# Patient Record
Sex: FEMALE | Race: WHITE | NOT HISPANIC OR LATINO | Employment: FULL TIME | URBAN - METROPOLITAN AREA
[De-identification: names, ages, dates, MRNs, and addresses within clinical notes are randomized per-mention and may not be internally consistent; named-entity substitution may affect disease eponyms.]

---

## 2017-01-02 ENCOUNTER — HOSPITAL ENCOUNTER (INPATIENT)
Facility: MEDICAL CENTER | Age: 42
LOS: 2 days | DRG: 872 | End: 2017-01-04
Attending: EMERGENCY MEDICINE | Admitting: FAMILY MEDICINE
Payer: COMMERCIAL

## 2017-01-02 ENCOUNTER — APPOINTMENT (OUTPATIENT)
Dept: RADIOLOGY | Facility: MEDICAL CENTER | Age: 42
DRG: 872 | End: 2017-01-02
Payer: COMMERCIAL

## 2017-01-02 ENCOUNTER — APPOINTMENT (OUTPATIENT)
Dept: RADIOLOGY | Facility: MEDICAL CENTER | Age: 42
DRG: 872 | End: 2017-01-02
Attending: EMERGENCY MEDICINE
Payer: COMMERCIAL

## 2017-01-02 ENCOUNTER — RESOLUTE PROFESSIONAL BILLING HOSPITAL PROF FEE (OUTPATIENT)
Dept: HOSPITALIST | Facility: MEDICAL CENTER | Age: 42
End: 2017-01-02
Payer: COMMERCIAL

## 2017-01-02 DIAGNOSIS — R10.9 ABDOMINAL PAIN, UNSPECIFIED LOCATION: ICD-10-CM

## 2017-01-02 DIAGNOSIS — R11.11 VOMITING WITHOUT NAUSEA, INTRACTABILITY OF VOMITING NOT SPECIFIED, UNSPECIFIED VOMITING TYPE: ICD-10-CM

## 2017-01-02 DIAGNOSIS — R19.7 DIARRHEA, UNSPECIFIED TYPE: ICD-10-CM

## 2017-01-02 PROBLEM — A41.9 SEPSIS (HCC): Status: ACTIVE | Noted: 2017-01-02

## 2017-01-02 LAB
ALBUMIN SERPL BCP-MCNC: 4.5 G/DL (ref 3.2–4.9)
ALBUMIN/GLOB SERPL: 1.7 G/DL
ALP SERPL-CCNC: 56 U/L (ref 30–99)
ALT SERPL-CCNC: 28 U/L (ref 2–50)
AMORPH CRY #/AREA URNS HPF: PRESENT /HPF
ANION GAP SERPL CALC-SCNC: 11 MMOL/L (ref 0–11.9)
APPEARANCE UR: ABNORMAL
AST SERPL-CCNC: 20 U/L (ref 12–45)
BACTERIA GENITAL QL WET PREP: NORMAL
BASOPHILS # BLD AUTO: 0.6 % (ref 0–1.8)
BASOPHILS # BLD: 0.13 K/UL (ref 0–0.12)
BILIRUB SERPL-MCNC: 0.5 MG/DL (ref 0.1–1.5)
BILIRUB UR QL STRIP.AUTO: NEGATIVE
BUN SERPL-MCNC: 20 MG/DL (ref 8–22)
C DIFF DNA SPEC QL NAA+PROBE: NEGATIVE
C DIFF TOX GENS STL QL NAA+PROBE: NEGATIVE
CALCIUM SERPL-MCNC: 9.4 MG/DL (ref 8.5–10.5)
CHLORIDE SERPL-SCNC: 107 MMOL/L (ref 96–112)
CO2 SERPL-SCNC: 15 MMOL/L (ref 20–33)
COLOR UR: YELLOW
CREAT SERPL-MCNC: 0.72 MG/DL (ref 0.5–1.4)
EKG IMPRESSION: NORMAL
EOSINOPHIL # BLD AUTO: 0.02 K/UL (ref 0–0.51)
EOSINOPHIL NFR BLD: 0.1 % (ref 0–6.9)
EPI CELLS #/AREA URNS HPF: NORMAL /HPF
ERYTHROCYTE [DISTWIDTH] IN BLOOD BY AUTOMATED COUNT: 39.8 FL (ref 35.9–50)
GFR SERPL CREATININE-BSD FRML MDRD: >60 ML/MIN/1.73 M 2
GLOBULIN SER CALC-MCNC: 2.7 G/DL (ref 1.9–3.5)
GLUCOSE SERPL-MCNC: 128 MG/DL (ref 65–99)
GLUCOSE UR STRIP.AUTO-MCNC: NEGATIVE MG/DL
HCG SERPL QL: NEGATIVE
HCT VFR BLD AUTO: 48.1 % (ref 37–47)
HGB BLD-MCNC: 16.9 G/DL (ref 12–16)
IMM GRANULOCYTES # BLD AUTO: 0.1 K/UL (ref 0–0.11)
IMM GRANULOCYTES NFR BLD AUTO: 0.4 % (ref 0–0.9)
KETONES UR STRIP.AUTO-MCNC: NEGATIVE MG/DL
LACTATE BLD-SCNC: 1.6 MMOL/L (ref 0.5–2)
LACTATE BLD-SCNC: 2 MMOL/L (ref 0.5–2)
LACTATE BLD-SCNC: 2.9 MMOL/L (ref 0.5–2)
LEUKOCYTE ESTERASE UR QL STRIP.AUTO: NEGATIVE
LIPASE SERPL-CCNC: 10 U/L (ref 11–82)
LYMPHOCYTES # BLD AUTO: 0.67 K/UL (ref 1–4.8)
LYMPHOCYTES NFR BLD: 3 % (ref 22–41)
MCH RBC QN AUTO: 31.4 PG (ref 27–33)
MCHC RBC AUTO-ENTMCNC: 35.1 G/DL (ref 33.6–35)
MCV RBC AUTO: 89.2 FL (ref 81.4–97.8)
MICRO URNS: ABNORMAL
MONOCYTES # BLD AUTO: 0.64 K/UL (ref 0–0.85)
MONOCYTES NFR BLD AUTO: 2.9 % (ref 0–13.4)
MUCOUS THREADS #/AREA URNS HPF: NORMAL /HPF
NEUTROPHILS # BLD AUTO: 20.85 K/UL (ref 2–7.15)
NEUTROPHILS NFR BLD: 93 % (ref 44–72)
NITRITE UR QL STRIP.AUTO: NEGATIVE
NRBC # BLD AUTO: 0 K/UL
NRBC BLD AUTO-RTO: 0 /100 WBC
PH UR STRIP.AUTO: 5.5 [PH]
PLATELET # BLD AUTO: 235 K/UL (ref 164–446)
PMV BLD AUTO: 10.7 FL (ref 9–12.9)
POTASSIUM SERPL-SCNC: 4 MMOL/L (ref 3.6–5.5)
PROT SERPL-MCNC: 7.2 G/DL (ref 6–8.2)
PROT UR QL STRIP: NEGATIVE MG/DL
RBC # BLD AUTO: 5.39 M/UL (ref 4.2–5.4)
RBC UR QL AUTO: ABNORMAL
SIGNIFICANT IND 70042: NORMAL
SITE SITE: NORMAL
SODIUM SERPL-SCNC: 133 MMOL/L (ref 135–145)
SOURCE SOURCE: NORMAL
SP GR UR STRIP.AUTO: 1.02
WBC # BLD AUTO: 22.4 K/UL (ref 4.8–10.8)

## 2017-01-02 PROCEDURE — 96365 THER/PROPH/DIAG IV INF INIT: CPT

## 2017-01-02 PROCEDURE — 87591 N.GONORRHOEAE DNA AMP PROB: CPT

## 2017-01-02 PROCEDURE — 99285 EMERGENCY DEPT VISIT HI MDM: CPT

## 2017-01-02 PROCEDURE — 71010 DX-CHEST-LIMITED (1 VIEW): CPT

## 2017-01-02 PROCEDURE — 36415 COLL VENOUS BLD VENIPUNCTURE: CPT

## 2017-01-02 PROCEDURE — 87040 BLOOD CULTURE FOR BACTERIA: CPT | Mod: 91

## 2017-01-02 PROCEDURE — 87086 URINE CULTURE/COLONY COUNT: CPT

## 2017-01-02 PROCEDURE — 87493 C DIFF AMPLIFIED PROBE: CPT

## 2017-01-02 PROCEDURE — 84703 CHORIONIC GONADOTROPIN ASSAY: CPT

## 2017-01-02 PROCEDURE — 83690 ASSAY OF LIPASE: CPT

## 2017-01-02 PROCEDURE — A9270 NON-COVERED ITEM OR SERVICE: HCPCS | Performed by: FAMILY MEDICINE

## 2017-01-02 PROCEDURE — 700105 HCHG RX REV CODE 258: Performed by: FAMILY MEDICINE

## 2017-01-02 PROCEDURE — 99223 1ST HOSP IP/OBS HIGH 75: CPT | Performed by: FAMILY MEDICINE

## 2017-01-02 PROCEDURE — 700102 HCHG RX REV CODE 250 W/ 637 OVERRIDE(OP): Performed by: FAMILY MEDICINE

## 2017-01-02 PROCEDURE — 700117 HCHG RX CONTRAST REV CODE 255: Performed by: EMERGENCY MEDICINE

## 2017-01-02 PROCEDURE — 87491 CHLMYD TRACH DNA AMP PROBE: CPT

## 2017-01-02 PROCEDURE — 770020 HCHG ROOM/CARE - TELE (206)

## 2017-01-02 PROCEDURE — 81001 URINALYSIS AUTO W/SCOPE: CPT

## 2017-01-02 PROCEDURE — 87503 INFLUENZA DNA AMP PROB ADDL: CPT

## 2017-01-02 PROCEDURE — 700105 HCHG RX REV CODE 258: Performed by: EMERGENCY MEDICINE

## 2017-01-02 PROCEDURE — 83605 ASSAY OF LACTIC ACID: CPT

## 2017-01-02 PROCEDURE — 84443 ASSAY THYROID STIM HORMONE: CPT

## 2017-01-02 PROCEDURE — 74177 CT ABD & PELVIS W/CONTRAST: CPT

## 2017-01-02 PROCEDURE — 80053 COMPREHEN METABOLIC PANEL: CPT

## 2017-01-02 PROCEDURE — 96361 HYDRATE IV INFUSION ADD-ON: CPT

## 2017-01-02 PROCEDURE — 700111 HCHG RX REV CODE 636 W/ 250 OVERRIDE (IP): Performed by: FAMILY MEDICINE

## 2017-01-02 PROCEDURE — 93005 ELECTROCARDIOGRAM TRACING: CPT

## 2017-01-02 PROCEDURE — 85025 COMPLETE CBC W/AUTO DIFF WBC: CPT

## 2017-01-02 PROCEDURE — 87502 INFLUENZA DNA AMP PROBE: CPT

## 2017-01-02 PROCEDURE — 85730 THROMBOPLASTIN TIME PARTIAL: CPT

## 2017-01-02 RX ORDER — ACETAMINOPHEN 325 MG/1
650 TABLET ORAL EVERY 6 HOURS PRN
Status: DISCONTINUED | OUTPATIENT
Start: 2017-01-02 | End: 2017-01-04 | Stop reason: HOSPADM

## 2017-01-02 RX ORDER — ONDANSETRON 4 MG/1
4 TABLET, ORALLY DISINTEGRATING ORAL EVERY 4 HOURS PRN
Status: DISCONTINUED | OUTPATIENT
Start: 2017-01-02 | End: 2017-01-04 | Stop reason: HOSPADM

## 2017-01-02 RX ORDER — EPINEPHRINE 0.3 MG/.3ML
0.3 INJECTION SUBCUTANEOUS ONCE
COMMUNITY
End: 2017-08-09

## 2017-01-02 RX ORDER — PROMETHAZINE HYDROCHLORIDE 25 MG/1
12.5-25 TABLET ORAL EVERY 4 HOURS PRN
Status: DISCONTINUED | OUTPATIENT
Start: 2017-01-02 | End: 2017-01-04 | Stop reason: HOSPADM

## 2017-01-02 RX ORDER — FUROSEMIDE 20 MG/1
20 TABLET ORAL PRN
Status: ON HOLD | COMMUNITY
End: 2018-06-04

## 2017-01-02 RX ORDER — OXYCODONE HYDROCHLORIDE 10 MG/1
10 TABLET ORAL EVERY 4 HOURS PRN
Status: DISCONTINUED | OUTPATIENT
Start: 2017-01-02 | End: 2017-01-04 | Stop reason: HOSPADM

## 2017-01-02 RX ORDER — OXYCODONE HYDROCHLORIDE 5 MG/1
5-10 TABLET ORAL EVERY 4 HOURS PRN
Status: DISCONTINUED | OUTPATIENT
Start: 2017-01-02 | End: 2017-01-02

## 2017-01-02 RX ORDER — SODIUM CHLORIDE 9 MG/ML
1000 INJECTION, SOLUTION INTRAVENOUS ONCE
Status: COMPLETED | OUTPATIENT
Start: 2017-01-02 | End: 2017-01-02

## 2017-01-02 RX ORDER — SODIUM CHLORIDE 9 MG/ML
INJECTION, SOLUTION INTRAVENOUS CONTINUOUS
Status: DISCONTINUED | OUTPATIENT
Start: 2017-01-02 | End: 2017-01-04 | Stop reason: HOSPADM

## 2017-01-02 RX ORDER — IBUPROFEN 200 MG
600 TABLET ORAL EVERY 6 HOURS PRN
Status: ON HOLD | COMMUNITY
End: 2017-08-23

## 2017-01-02 RX ORDER — POTASSIUM CHLORIDE 750 MG/1
10 TABLET, EXTENDED RELEASE ORAL PRN
Status: ON HOLD | COMMUNITY
End: 2017-01-04

## 2017-01-02 RX ORDER — SODIUM CHLORIDE 9 MG/ML
1000 INJECTION, SOLUTION INTRAVENOUS
Status: COMPLETED | OUTPATIENT
Start: 2017-01-02 | End: 2017-01-03

## 2017-01-02 RX ORDER — CIPROFLOXACIN 2 MG/ML
400 INJECTION, SOLUTION INTRAVENOUS EVERY 12 HOURS
Status: DISCONTINUED | OUTPATIENT
Start: 2017-01-02 | End: 2017-01-04

## 2017-01-02 RX ORDER — OXYCODONE HYDROCHLORIDE 5 MG/1
5 TABLET ORAL EVERY 4 HOURS PRN
Status: DISCONTINUED | OUTPATIENT
Start: 2017-01-02 | End: 2017-01-04 | Stop reason: HOSPADM

## 2017-01-02 RX ORDER — PROMETHAZINE HYDROCHLORIDE 25 MG/1
12.5-25 SUPPOSITORY RECTAL EVERY 4 HOURS PRN
Status: DISCONTINUED | OUTPATIENT
Start: 2017-01-02 | End: 2017-01-04 | Stop reason: HOSPADM

## 2017-01-02 RX ORDER — SODIUM CHLORIDE 9 MG/ML
30 INJECTION, SOLUTION INTRAVENOUS
Status: COMPLETED | OUTPATIENT
Start: 2017-01-02 | End: 2017-01-03

## 2017-01-02 RX ORDER — ONDANSETRON 2 MG/ML
4 INJECTION INTRAMUSCULAR; INTRAVENOUS EVERY 4 HOURS PRN
Status: DISCONTINUED | OUTPATIENT
Start: 2017-01-02 | End: 2017-01-04 | Stop reason: HOSPADM

## 2017-01-02 RX ADMIN — ACETAMINOPHEN 650 MG: 325 TABLET, FILM COATED ORAL at 21:21

## 2017-01-02 RX ADMIN — IOHEXOL 100 ML: 350 INJECTION, SOLUTION INTRAVENOUS at 18:13

## 2017-01-02 RX ADMIN — SODIUM CHLORIDE 1000 ML: 9 INJECTION, SOLUTION INTRAVENOUS at 16:50

## 2017-01-02 RX ADMIN — SODIUM CHLORIDE 2910 ML: 9 INJECTION, SOLUTION INTRAVENOUS at 21:33

## 2017-01-02 RX ADMIN — CIPROFLOXACIN 400 MG: 2 INJECTION, SOLUTION INTRAVENOUS at 21:23

## 2017-01-02 ASSESSMENT — ENCOUNTER SYMPTOMS
VOMITING: 1
SHORTNESS OF BREATH: 1
ABDOMINAL PAIN: 1
BLOOD IN STOOL: 0
NAUSEA: 1
DIARRHEA: 1
ROS GI COMMENTS: NO HEMATEMESIS
MYALGIAS: 1
FEVER: 1
SORE THROAT: 0

## 2017-01-02 ASSESSMENT — PAIN SCALES - GENERAL
PAINLEVEL_OUTOF10: 5
PAINLEVEL_OUTOF10: 4
PAINLEVEL_OUTOF10: 5

## 2017-01-02 ASSESSMENT — LIFESTYLE VARIABLES
EVER_SMOKED: YES
ALCOHOL_USE: NO

## 2017-01-02 NOTE — IP AVS SNAPSHOT
Uberseq Access Code: [email or web address]  Expires: 2/3/2017  3:30 PM    Your email address is not on file at [employer]  Email Addresses are required for you to sign up for Uberseq, please contact [phone] to verify your personal information and to provide your email address prior to attempting to register for Uberseq.    Bria Martinez  [address] [place] [address]    Uberseq  A secure, online tool to manage your health information     [employer]’s Uberseq® is a secure, online tool that connects you to your personalized health information from the privacy of your home -- day or night - making it very easy for you to manage your healthcare. Once the activation process is completed, you can even access your medical information using the Uberseq alessandro, which is available for free in the Apple Alessandro store or Google Play store.     To learn more about Uberseq, visit [email or web address]    There are two levels of access available (as shown below):   My Chart Features  [facility] Primary Care Doctor [facility]  Specialists [facility]  Urgent  Care Non-[facility] Primary Care Doctor   Email your healthcare team securely and privately 24/7 X X X    Manage appointments: schedule your next appointment; view details of past/upcoming appointments X      Request prescription refills. X      View recent personal medical records, including lab and immunizations X X X X   View health record, including health history, allergies, medications X X X X   Read reports about your outpatient visits, procedures, consult and ER notes X X X X   See your discharge summary, which is a recap of your hospital and/or ER visit that includes your diagnosis, lab results, and care plan X X  X     How to register for Uberseq:  Once your e-mail address has been verified, follow the following steps to sign up for Uberseq.     1. Go to  [email or web address]  2. Click on the Sign Up Now box, which takes you to the New Member Sign Up page. You  will need to provide the following information:  a. Enter your Shopsy Access Code exactly as it appears at the top of this page. (You will not need to use this code after you’ve completed the sign-up process. If you do not sign up before the expiration date, you must request a new code.)   b. Enter your date of birth.   c. Enter your home email address.   d. Click Submit, and follow the next screen’s instructions.  3. Create a QR Pharmat ID. This will be your Shopsy login ID and cannot be changed, so think of one that is secure and easy to remember.  4. Create a Shopsy password. You can change your password at any time.  5. Enter your Password Reset Question and Answer. This can be used at a later time if you forget your password.   6. Enter your e-mail address. This allows you to receive e-mail notifications when new information is available in Shopsy.  7. Click Sign Up. You can now view your health information.    For assistance activating your Shopsy account, call [phone]

## 2017-01-02 NOTE — ED NOTES
Pt ambulatory to triage. EKG performed and shown to ERP    2 weeks ago patient had the same symptoms 2 weeks ago lasting 3-4 days  Symptoms returned this AM. cramping in legs, stomach,   HX of edema

## 2017-01-02 NOTE — IP AVS SNAPSHOT
" <p align=\"LEFT\"><IMG SRC=\"[email or web address]\" alt=\"Image\" WIDTH=\"50%\" HEIGHT=\"200\" BORDER=\"\"></p>                   Name:Bria Martinez  Medical Record Number:[id number]  CSN: [id number]    YOB: 1975   Age: 41 y.o.  Sex: female  HT:1.6 m (5' 2.99\") WT: 108.4 kg (238 lb 15.7 oz)          Admit Date: 1/2/2017     Discharge Date:   Today's Date: 1/4/2017  Attending Doctor:  Edward Butterfield M.D.                  Allergies:  Bee; Augmentin; Latex; Other misc; and Tape          Follow-up Information     1. Follow up with Kelvin Pearson M.D..    Specialty:  Family Medicine    Contact information    [address]  [address]  [facility] [address]  [phone]           Medication List      Take these Medications        Instructions    CALCIUM-MAGNESIUM-ZINC PO    Take 1 Tab by mouth every day.   Dose:  1 Tab       ciprofloxacin 500 MG Tabs   Commonly known as:  CIPRO    Take 1 Tab by mouth 2 times a day for 5 days.   Dose:  500 mg       EPIPEN 2-MARLYN 0.3 MG/0.3ML Soaj solution for injection   Generic drug:  EPINEPHrine    0.3 mg by Intramuscular route Once. Indications: Life-Threatening Allergic Reaction   Dose:  0.3 mg       furosemide 20 MG Tabs   Commonly known as:  LASIX    Take 20 mg by mouth as needed. Indications: Edema   Dose:  20 mg       ibuprofen 200 MG Tabs   Commonly known as:  MOTRIN    Take 600 mg by mouth every 6 hours as needed for Mild Pain.   Dose:  600 mg       indomethacin 25 MG Caps   Commonly known as:  INDOCIN    Take 1 Cap by mouth 3 times a day, with meals for 3 days.   Dose:  25 mg       metronidazole 500 MG Tabs   Commonly known as:  FLAGYL    Take 1 Tab by mouth every 8 hours for 5 days.   Dose:  500 mg       potassium chloride SA 20 MEQ Tbcr   What changed:    - medication strength  - when to take this  - reasons to take this   Commonly known as:  K-DUR    Take 0.5 Tabs by mouth every day.   Dose:  10 mEq       vitamin C 500 MG Chew    Take 1,500 mg by mouth every day.   "   Dose:  1500 mg

## 2017-01-02 NOTE — ED NOTES
Charge nurse informed of patients acuity and vital signs.   Protocol ordered and blood drawn sent to lab

## 2017-01-02 NOTE — IP AVS SNAPSHOT
1/4/2017          Bria Martinez  [address]  [place] [place] [address]    Dear Bria:    [facility] wants to ensure your discharge home is safe and you or your loved ones have had all your questions answered regarding your care after you leave the hospital.    You may receive a telephone call within two days of your discharge.  This call is to make certain you understand your discharge instructions as well as ensure we provided you with the best care possible during your stay with us.     The call will only last approximately 3-5 minutes and will be done by a nurse.    Once again, we want to ensure your discharge home is safe and that you have a clear understanding of any next steps in your care.  If you have any questions or concerns, please do not hesitate to contact us, we are here for you.  Thank you for choosing [facility] for your healthcare needs.    Sincerely,    Sourav Shelton    [facility]

## 2017-01-02 NOTE — IP AVS SNAPSHOT
" After Visit Summary                                                                                                                  Name:Bria Martinez  Medical Record Number:[id number]  CSN: [id number]    YOB: 1975   Age: 41 y.o.  Sex: female  HT:1.6 m (5' 2.99\") WT: 108.4 kg (238 lb 15.7 oz)          Admit Date: 1/2/2017     Discharge Date:   Today's Date: 1/4/2017  Attending Doctor:  Edward Butterfield M.D.                  Allergies:  Bee; Augmentin; Latex; Other misc; and Tape            Discharge Instructions       Discharge Instructions    Discharged to home by car with relative. Discharged via wheelchair, hospital escort: Refused.  Special equipment needed: Not Applicable    Be sure to schedule a follow-up appointment with your primary care doctor or any specialists as instructed.     Discharge Plan:   Diet Plan: Discussed  Activity Level: Discussed  Smoking Cessation Offered: Patient Refused  Confirmed Follow up Appointment: Patient to Call and Schedule Appointment  Confirmed Symptoms Management: Discussed  Medication Reconciliation Updated: Yes  Influenza Vaccine Indication: Patient Refuses    I understand that a diet low in cholesterol, fat, and sodium is recommended for good health. Unless I have been given specific instructions below for another diet, I accept this instruction as my diet prescription.   Other diet:     Special Instructions: Sepsis, Adult  Sepsis is a serious infection of your blood or tissues that affects your whole body. The infection that causes sepsis may be bacterial, viral, fungal, or parasitic. Sepsis may be life threatening. Sepsis can cause your blood pressure to drop. This may result in shock. Shock causes your central nervous system and your organs to stop working correctly.   RISK FACTORS  Sepsis can happen in anyone, but it is more likely to happen in people who have weakened immune systems.  SIGNS AND SYMPTOMS   Symptoms of sepsis can include:  · Fever or low " body temperature (hypothermia).  · Rapid breathing (hyperventilation).  · Chills.  · Rapid heartbeat (tachycardia).  · Confusion or light-headedness.  · Trouble breathing.  · Urinating much less than usual.  · Cool, clammy skin or red, flushed skin.  · Other problems with the heart, kidneys, or brain.  DIAGNOSIS   Your health care provider will likely do tests to look for an infection, to see if the infection has spread to your blood, and to see how serious your condition is. Tests can include:  · Blood tests, including cultures of your blood.  · Cultures of other fluids from your body, such as:  ¨ Urine.  ¨ Pus from wounds.  ¨ Mucus coughed up from your lungs.  · Urine tests other than cultures.  · X-ray exams or other imaging tests.  TREATMENT   Treatment will begin with elimination of the source of infection. If your sepsis is likely caused by a bacterial or fungal infection, you will be given antibiotic or antifungal medicines.  You may also receive:  · Oxygen.  · Fluids through an IV tube.  · Medicines to increase your blood pressure.  · A machine to clean your blood (dialysis) if your kidneys fail.  · A machine to help you breathe if your lungs fail.  SEEK IMMEDIATE MEDICAL CARE IF:  You get an infection or develop any of the signs and symptoms of sepsis after surgery or a hospitalization.     This information is not intended to replace advice given to you by your health care provider. Make sure you discuss any questions you have with your health care provider.     Document Released: 09/15/2004 Document Revised: 05/03/2016 Document Reviewed: 08/25/2014  [employer] Interactive Patient Education ©2016 [employer]      · Is patient discharged on Warfarin / Coumadin?   No     · Is patient Post Blood Transfusion?  No    Depression / Suicide Risk    As you are discharged from this [facility] facility, it is important to learn how to keep safe from harming yourself.    Recognize the warning signs:  · Abrupt  changes in personality, positive or negative- including increase in energy   · Giving away possessions  · Change in eating patterns- significant weight changes-  positive or negative  · Change in sleeping patterns- unable to sleep or sleeping all the time   · Unwillingness or inability to communicate  · Depression  · Unusual sadness, discouragement and loneliness  · Talk of wanting to die  · Neglect of personal appearance   · Rebelliousness- reckless behavior  · Withdrawal from people/activities they love  · Confusion- inability to concentrate     If you or a loved one observes any of these behaviors or has concerns about self-harm, here's what you can do:  · Talk about it- your feelings and reasons for harming yourself  · Remove any means that you might use to hurt yourself (examples: pills, rope, extension cords, firearm)  · Get professional help from the community (Mental Health, Substance Abuse, psychological counseling)  · Do not be alone:Call your Safe Contact- someone whom you trust who will be there for you.  · Call your local CRISIS HOTLINE 857-6745 or 922-862-9676  · Call your local Children's Mobile Crisis Response Team [place] (830) 249-3059 or www.Insiders@ Project  · Call the toll free National Suicide Prevention Hotlines   · National Suicide Prevention Lifeline 728-906-SSKV (5446)  · National Hope Line Network 800-SUICIDE (893-2719)        Follow-up Information     1. Follow up with Kelvin Pearson M.D..    Specialty:  Family Medicine    Contact information    [address]  [address]  [place] [address]  [phone]           Discharge Medication Instructions:    Below are the medications your physician expects you to take upon discharge:    Review all your home medications and newly ordered medications with your doctor and/or pharmacist. Follow medication instructions as directed by your doctor and/or pharmacist.    Please keep your medication list with you and share with your physician.                  Medication List      START taking these medications        Instructions    ciprofloxacin 500 MG Tabs   Commonly known as:  CIPRO    Take 1 Tab by mouth 2 times a day for 5 days.   Dose:  500 mg       indomethacin 25 MG Caps   Commonly known as:  INDOCIN    Take 1 Cap by mouth 3 times a day, with meals for 3 days.   Dose:  25 mg       metronidazole 500 MG Tabs   Commonly known as:  FLAGYL    Take 1 Tab by mouth every 8 hours for 5 days.   Dose:  500 mg         CHANGE how you take these medications        Instructions    potassium chloride SA 20 MEQ Tbcr   What changed:    - medication strength  - when to take this  - reasons to take this   Last time this was given:  20 mEq on 1/4/2017  8:32 AM   Commonly known as:  K-DUR   Next Dose Due:  1/5/16    Take 0.5 Tabs by mouth every day.   Dose:  10 mEq         CONTINUE taking these medications        Instructions    CALCIUM-MAGNESIUM-ZINC PO    Take 1 Tab by mouth every day.   Dose:  1 Tab       EPIPEN 2-MARLYN 0.3 MG/0.3ML Soaj solution for injection   Generic drug:  EPINEPHrine    0.3 mg by Intramuscular route Once. Indications: Life-Threatening Allergic Reaction   Dose:  0.3 mg       furosemide 20 MG Tabs   Commonly known as:  LASIX    Take 20 mg by mouth as needed. Indications: Edema   Dose:  20 mg       ibuprofen 200 MG Tabs   Commonly known as:  MOTRIN    Take 600 mg by mouth every 6 hours as needed for Mild Pain.   Dose:  600 mg       vitamin C 500 MG Chew    Take 1,500 mg by mouth every day.   Dose:  1500 mg               Instructions           Diet / Nutrition:    Follow any diet instructions given to you by your doctor or the dietician, including how much salt (sodium) you are allowed each day.    If you are overweight, talk to your doctor about a weight reduction plan.    Activity:    Remain physically active following your doctor's instructions about exercise and activity.    Rest often.     Any time you become even a little tired or short of breath, SIT  DOWN and rest.    Worsening Symptoms:    Report any of the following signs and symptoms to the doctor's office immediately:    *Pain of jaw, arm, or neck  *Chest pain not relieved by medication                               *Dizziness or loss of consciousness  *Difficulty breathing even when at rest   *More tired than usual                                       *Bleeding drainage or swelling of surgical site  *Swelling of feet, ankles, legs or stomach                 *Fever (>100ºF)  *Pink or blood tinged sputum  *Weight gain (3lbs/day or 5lbs /week)           *Shock from internal defibrillator (if applicable)  *Palpitations or irregular heartbeats                *Cool and/or numb extremities    Stroke Awareness    Common Risk Factors for Stroke include:    Age  Atrial Fibrillation  Carotid Artery Stenosis  Diabetes Mellitus  Excessive alcohol consumption  High blood pressure  Overweight   Physical inactivity  Smoking    Warning signs and symptoms of a stroke include:    *Sudden numbness or weakness of the face, arm or leg (especially on one side of the body).  *Sudden confusion, trouble speaking or understanding.  *Sudden trouble seeing in one or both eyes.  *Sudden trouble walking, dizziness, loss of balance or coordination.Sudden severe headache with no known cause.    It is very important to get treatment quickly when a stroke occurs. If you experience any of the above warning signs, call 911 immediately.                   Disclaimer         Quit Smoking / Tobacco Use:    I understand the use of any tobacco products increases my chance of suffering from future heart disease or stroke and could cause other illnesses which may shorten my life. Quitting the use of tobacco products is the single most important thing I can do to improve my health. For further information on smoking / tobacco cessation call a Toll Free Quit Line at 1-503.180.9902 (*National Cancer [place]) or 1-847.787.3760 (American Lung  Association) or you can access the web based program at www.lungusa.org.    [place] Tobacco Users Help Line:  (767) 545-3658       Toll Free: 1-747.576.1944  Quit Tobacco Program [facility] (173)778-9294    Crisis Hotline:    [place] Crisis Hotline:  9-938-KWBSTXY or 1-325.267.3345    [place] Crisis Hotline:    1-311.843.2820 or 586-954-0006    Discharge Survey:   Thank you for choosing [facility]. We hope we did everything we could to make your hospital stay a pleasant one. You may be receiving a phone survey and we would appreciate your time and participation in answering the questions. Your input is very valuable to us in our efforts to improve our service to our patients and their families.        My signature on this form indicates that:    1. I have reviewed and understand the above information.  2. My questions regarding this information have been answered to my satisfaction.  3. I have formulated a plan with my discharge nurse to obtain my prescribed medications for home.                  Disclaimer         __________________________________                     __________       ________                       Patient Signature                                                 Date                    Time

## 2017-01-03 PROBLEM — F17.200 TOBACCO DEPENDENCE: Status: ACTIVE | Noted: 2017-01-03

## 2017-01-03 PROBLEM — E83.42 HYPOMAGNESEMIA: Status: ACTIVE | Noted: 2017-01-03

## 2017-01-03 PROBLEM — K52.9 ENTERITIS: Status: ACTIVE | Noted: 2017-01-03

## 2017-01-03 PROBLEM — E87.6 HYPOKALEMIA: Status: ACTIVE | Noted: 2017-01-03

## 2017-01-03 PROBLEM — N83.209 OVARIAN CYST: Status: ACTIVE | Noted: 2017-01-03

## 2017-01-03 LAB
ALBUMIN SERPL BCP-MCNC: 3.2 G/DL (ref 3.2–4.9)
ALBUMIN/GLOB SERPL: 1.5 G/DL
ALP SERPL-CCNC: 44 U/L (ref 30–99)
ALT SERPL-CCNC: 25 U/L (ref 2–50)
ANION GAP SERPL CALC-SCNC: 5 MMOL/L (ref 0–11.9)
ANION GAP SERPL CALC-SCNC: 8 MMOL/L (ref 0–11.9)
APTT PPP: 26.2 SEC (ref 24.7–36)
AST SERPL-CCNC: 21 U/L (ref 12–45)
BASOPHILS # BLD AUTO: 0.5 % (ref 0–1.8)
BASOPHILS # BLD AUTO: 0.9 % (ref 0–1.8)
BASOPHILS # BLD: 0.05 K/UL (ref 0–0.12)
BASOPHILS # BLD: 0.07 K/UL (ref 0–0.12)
BILIRUB SERPL-MCNC: 0.5 MG/DL (ref 0.1–1.5)
BUN SERPL-MCNC: 14 MG/DL (ref 8–22)
BUN SERPL-MCNC: 7 MG/DL (ref 8–22)
C TRACH DNA SPEC QL NAA+PROBE: NEGATIVE
CALCIUM SERPL-MCNC: 7.3 MG/DL (ref 8.5–10.5)
CALCIUM SERPL-MCNC: 7.8 MG/DL (ref 8.5–10.5)
CHLORIDE SERPL-SCNC: 107 MMOL/L (ref 96–112)
CHLORIDE SERPL-SCNC: 112 MMOL/L (ref 96–112)
CO2 SERPL-SCNC: 18 MMOL/L (ref 20–33)
CO2 SERPL-SCNC: 21 MMOL/L (ref 20–33)
CREAT SERPL-MCNC: 0.57 MG/DL (ref 0.5–1.4)
CREAT SERPL-MCNC: 0.71 MG/DL (ref 0.5–1.4)
EOSINOPHIL # BLD AUTO: 0.02 K/UL (ref 0–0.51)
EOSINOPHIL # BLD AUTO: 0.05 K/UL (ref 0–0.51)
EOSINOPHIL NFR BLD: 0.2 % (ref 0–6.9)
EOSINOPHIL NFR BLD: 0.9 % (ref 0–6.9)
ERYTHROCYTE [DISTWIDTH] IN BLOOD BY AUTOMATED COUNT: 40 FL (ref 35.9–50)
ERYTHROCYTE [DISTWIDTH] IN BLOOD BY AUTOMATED COUNT: 41.1 FL (ref 35.9–50)
FLUAV H1 2009 PAND RNA SPEC QL NAA+PROBE: NOT DETECTED
FLUAV RNA SPEC QL NAA+PROBE: NEGATIVE
FLUBV RNA SPEC QL NAA+PROBE: NEGATIVE
GFR SERPL CREATININE-BSD FRML MDRD: >60 ML/MIN/1.73 M 2
GFR SERPL CREATININE-BSD FRML MDRD: >60 ML/MIN/1.73 M 2
GLOBULIN SER CALC-MCNC: 2.1 G/DL (ref 1.9–3.5)
GLUCOSE SERPL-MCNC: 103 MG/DL (ref 65–99)
GLUCOSE SERPL-MCNC: 89 MG/DL (ref 65–99)
HCT VFR BLD AUTO: 37.9 % (ref 37–47)
HCT VFR BLD AUTO: 39.9 % (ref 37–47)
HGB BLD-MCNC: 12.6 G/DL (ref 12–16)
HGB BLD-MCNC: 13.6 G/DL (ref 12–16)
IMM GRANULOCYTES # BLD AUTO: 0.03 K/UL (ref 0–0.11)
IMM GRANULOCYTES NFR BLD AUTO: 0.2 % (ref 0–0.9)
INR PPP: 1.15 (ref 0.87–1.13)
LACTATE BLD-SCNC: 0.9 MMOL/L (ref 0.5–2)
LYMPHOCYTES # BLD AUTO: 1.4 K/UL (ref 1–4.8)
LYMPHOCYTES # BLD AUTO: 1.4 K/UL (ref 1–4.8)
LYMPHOCYTES NFR BLD: 10.7 % (ref 22–41)
LYMPHOCYTES NFR BLD: 26.9 % (ref 22–41)
MAGNESIUM SERPL-MCNC: 1.5 MG/DL (ref 1.5–2.5)
MANUAL DIFF BLD: NORMAL
MCH RBC QN AUTO: 30.5 PG (ref 27–33)
MCH RBC QN AUTO: 30.6 PG (ref 27–33)
MCHC RBC AUTO-ENTMCNC: 33.2 G/DL (ref 33.6–35)
MCHC RBC AUTO-ENTMCNC: 33.8 G/DL (ref 33.6–35)
MCV RBC AUTO: 90.5 FL (ref 81.4–97.8)
MCV RBC AUTO: 92 FL (ref 81.4–97.8)
MONOCYTES # BLD AUTO: 0.22 K/UL (ref 0–0.85)
MONOCYTES # BLD AUTO: 0.69 K/UL (ref 0–0.85)
MONOCYTES NFR BLD AUTO: 4.3 % (ref 0–13.4)
MONOCYTES NFR BLD AUTO: 5.3 % (ref 0–13.4)
MORPHOLOGY BLD-IMP: NORMAL
N GONORRHOEA DNA SPEC QL NAA+PROBE: NEGATIVE
NEUTROPHILS # BLD AUTO: 10.84 K/UL (ref 2–7.15)
NEUTROPHILS # BLD AUTO: 3.48 K/UL (ref 2–7.15)
NEUTROPHILS NFR BLD: 63.5 % (ref 44–72)
NEUTROPHILS NFR BLD: 83.1 % (ref 44–72)
NEUTS BAND NFR BLD MANUAL: 3.5 % (ref 0–10)
NRBC # BLD AUTO: 0 K/UL
NRBC # BLD AUTO: 0 K/UL
NRBC BLD AUTO-RTO: 0 /100 WBC
NRBC BLD AUTO-RTO: 0 /100 WBC
PLATELET # BLD AUTO: 170 K/UL (ref 164–446)
PLATELET # BLD AUTO: 193 K/UL (ref 164–446)
PLATELET BLD QL SMEAR: NORMAL
PMV BLD AUTO: 11 FL (ref 9–12.9)
PMV BLD AUTO: 11.1 FL (ref 9–12.9)
POTASSIUM SERPL-SCNC: 3.1 MMOL/L (ref 3.6–5.5)
POTASSIUM SERPL-SCNC: 3.6 MMOL/L (ref 3.6–5.5)
PROT SERPL-MCNC: 5.3 G/DL (ref 6–8.2)
PROTHROMBIN TIME: 15.1 SEC (ref 12–14.6)
RBC # BLD AUTO: 4.12 M/UL (ref 4.2–5.4)
RBC # BLD AUTO: 4.42 M/UL (ref 4.2–5.4)
RBC BLD AUTO: NORMAL
SODIUM SERPL-SCNC: 133 MMOL/L (ref 135–145)
SODIUM SERPL-SCNC: 138 MMOL/L (ref 135–145)
SPECIMEN SOURCE: NORMAL
TSH SERPL DL<=0.005 MIU/L-ACNC: 0.53 UIU/ML (ref 0.3–3.7)
WBC # BLD AUTO: 13.1 K/UL (ref 4.8–10.8)
WBC # BLD AUTO: 5.2 K/UL (ref 4.8–10.8)

## 2017-01-03 PROCEDURE — 700102 HCHG RX REV CODE 250 W/ 637 OVERRIDE(OP): Performed by: INTERNAL MEDICINE

## 2017-01-03 PROCEDURE — A9270 NON-COVERED ITEM OR SERVICE: HCPCS | Performed by: INTERNAL MEDICINE

## 2017-01-03 PROCEDURE — 700102 HCHG RX REV CODE 250 W/ 637 OVERRIDE(OP): Performed by: FAMILY MEDICINE

## 2017-01-03 PROCEDURE — 36415 COLL VENOUS BLD VENIPUNCTURE: CPT

## 2017-01-03 PROCEDURE — 85027 COMPLETE CBC AUTOMATED: CPT

## 2017-01-03 PROCEDURE — 770020 HCHG ROOM/CARE - TELE (206)

## 2017-01-03 PROCEDURE — 99233 SBSQ HOSP IP/OBS HIGH 50: CPT | Performed by: INTERNAL MEDICINE

## 2017-01-03 PROCEDURE — 83735 ASSAY OF MAGNESIUM: CPT

## 2017-01-03 PROCEDURE — 85610 PROTHROMBIN TIME: CPT

## 2017-01-03 PROCEDURE — 700105 HCHG RX REV CODE 258: Performed by: FAMILY MEDICINE

## 2017-01-03 PROCEDURE — 700111 HCHG RX REV CODE 636 W/ 250 OVERRIDE (IP): Performed by: INTERNAL MEDICINE

## 2017-01-03 PROCEDURE — A9270 NON-COVERED ITEM OR SERVICE: HCPCS | Performed by: FAMILY MEDICINE

## 2017-01-03 PROCEDURE — 80048 BASIC METABOLIC PNL TOTAL CA: CPT

## 2017-01-03 PROCEDURE — 85007 BL SMEAR W/DIFF WBC COUNT: CPT

## 2017-01-03 PROCEDURE — 700101 HCHG RX REV CODE 250: Performed by: FAMILY MEDICINE

## 2017-01-03 PROCEDURE — 700111 HCHG RX REV CODE 636 W/ 250 OVERRIDE (IP): Performed by: FAMILY MEDICINE

## 2017-01-03 PROCEDURE — 83605 ASSAY OF LACTIC ACID: CPT

## 2017-01-03 RX ORDER — SODIUM CHLORIDE 9 MG/ML
1000 INJECTION, SOLUTION INTRAVENOUS ONCE
Status: COMPLETED | OUTPATIENT
Start: 2017-01-03 | End: 2017-01-03

## 2017-01-03 RX ORDER — POTASSIUM CHLORIDE 1.5 G/1.58G
20 POWDER, FOR SOLUTION ORAL DAILY
Status: DISCONTINUED | OUTPATIENT
Start: 2017-01-03 | End: 2017-01-04 | Stop reason: HOSPADM

## 2017-01-03 RX ORDER — MAGNESIUM SULFATE HEPTAHYDRATE 40 MG/ML
2 INJECTION, SOLUTION INTRAVENOUS ONCE
Status: COMPLETED | OUTPATIENT
Start: 2017-01-03 | End: 2017-01-03

## 2017-01-03 RX ORDER — POTASSIUM CHLORIDE 20 MEQ/1
20 TABLET, EXTENDED RELEASE ORAL DAILY
Status: DISCONTINUED | OUTPATIENT
Start: 2017-01-03 | End: 2017-01-04 | Stop reason: HOSPADM

## 2017-01-03 RX ORDER — NICOTINE 21 MG/24HR
14 PATCH, TRANSDERMAL 24 HOURS TRANSDERMAL
Status: DISCONTINUED | OUTPATIENT
Start: 2017-01-03 | End: 2017-01-04 | Stop reason: HOSPADM

## 2017-01-03 RX ORDER — HEPARIN SODIUM 5000 [USP'U]/ML
5000 INJECTION, SOLUTION INTRAVENOUS; SUBCUTANEOUS EVERY 8 HOURS
Status: DISCONTINUED | OUTPATIENT
Start: 2017-01-03 | End: 2017-01-04 | Stop reason: HOSPADM

## 2017-01-03 RX ADMIN — METRONIDAZOLE 500 MG: 500 INJECTION, SOLUTION INTRAVENOUS at 14:48

## 2017-01-03 RX ADMIN — METRONIDAZOLE 500 MG: 500 INJECTION, SOLUTION INTRAVENOUS at 01:28

## 2017-01-03 RX ADMIN — SODIUM CHLORIDE 1000 ML: 9 INJECTION, SOLUTION INTRAVENOUS at 12:36

## 2017-01-03 RX ADMIN — CIPROFLOXACIN 400 MG: 2 INJECTION, SOLUTION INTRAVENOUS at 20:39

## 2017-01-03 RX ADMIN — METRONIDAZOLE 500 MG: 500 INJECTION, SOLUTION INTRAVENOUS at 22:42

## 2017-01-03 RX ADMIN — METRONIDAZOLE 500 MG: 500 INJECTION, SOLUTION INTRAVENOUS at 06:13

## 2017-01-03 RX ADMIN — ONDANSETRON 4 MG: 2 INJECTION, SOLUTION INTRAMUSCULAR; INTRAVENOUS at 01:28

## 2017-01-03 RX ADMIN — SODIUM CHLORIDE: 9 INJECTION, SOLUTION INTRAVENOUS at 02:21

## 2017-01-03 RX ADMIN — SODIUM CHLORIDE 1000 ML: 9 INJECTION, SOLUTION INTRAVENOUS at 02:52

## 2017-01-03 RX ADMIN — ACETAMINOPHEN, ASPIRIN AND CAFFEINE 1 TABLET: 250; 250; 65 TABLET, FILM COATED ORAL at 17:59

## 2017-01-03 RX ADMIN — SODIUM CHLORIDE: 9 INJECTION, SOLUTION INTRAVENOUS at 12:37

## 2017-01-03 RX ADMIN — MAGNESIUM SULFATE IN WATER 2 G: 40 INJECTION, SOLUTION INTRAVENOUS at 16:03

## 2017-01-03 RX ADMIN — CIPROFLOXACIN 400 MG: 2 INJECTION, SOLUTION INTRAVENOUS at 08:38

## 2017-01-03 RX ADMIN — POTASSIUM CHLORIDE 20 MEQ: 1500 TABLET, EXTENDED RELEASE ORAL at 03:09

## 2017-01-03 RX ADMIN — ACETAMINOPHEN 650 MG: 325 TABLET, FILM COATED ORAL at 08:37

## 2017-01-03 RX ADMIN — SODIUM CHLORIDE: 9 INJECTION, SOLUTION INTRAVENOUS at 22:42

## 2017-01-03 ASSESSMENT — PAIN SCALES - GENERAL
PAINLEVEL_OUTOF10: 6
PAINLEVEL_OUTOF10: 4
PAINLEVEL_OUTOF10: 3
PAINLEVEL_OUTOF10: 3
PAINLEVEL_OUTOF10: 4
PAINLEVEL_OUTOF10: 0

## 2017-01-03 ASSESSMENT — COPD QUESTIONNAIRES
DO YOU EVER COUGH UP ANY MUCUS OR PHLEGM?: NO/ONLY WITH OCCASIONAL COLDS OR INFECTIONS
DURING THE PAST 4 WEEKS HOW MUCH DID YOU FEEL SHORT OF BREATH: NONE/LITTLE OF THE TIME
COPD SCREENING SCORE: 2
HAVE YOU SMOKED AT LEAST 100 CIGARETTES IN YOUR ENTIRE LIFE: YES

## 2017-01-03 ASSESSMENT — LIFESTYLE VARIABLES: EVER_SMOKED: YES

## 2017-01-03 NOTE — PROGRESS NOTES
Dr Butterfield notified of patient blood pressure 81/61 with IV fluids running at 150. 1000ml fluid bolus ordered.

## 2017-01-03 NOTE — ED PROVIDER NOTES
"ED Provider Note    Scribed for Shawn Landon M.D. by Deb Mcdonald. 2017, 4:12 PM.    Primary care provider: Kelvin Pearson M.D.  Means of arrival: Walk-in  History obtained from: Patient   History limited by: None     CHIEF COMPLAINT  Chief Complaint   Patient presents with   • Nausea/Vomiting/Diarrhea     started this am     HPI  Bria Martinez is a 41 y.o. female who presents to the Emergency Department for bilateral, dull, upper quadrant abdominal pain, nausea, vomiting and diarrhea onset this morning that has been constant since then. She states that she she has a bowel movement her abdominal pain is exacerbated. Per patient she has vomited twice since onset. The patient reports subjective fevers. Per patient she experienced intermittent dyspnea. She states that two weeks ago she had the same symptoms for three days that resolved until this morning. Per patient she has intermittent \"cramping\" pain to her bilateral lower extremities. The patient reports that she has experienced foul smelling vaginal bleeding that is \"spotting\" for the past week although she started menopause four months ago. She denies any recent hematemesis, melena or bloody stools, chest pain,  sore throat, congestion, dysuria. Per patient she is sexually active. The patient admits to smoking cigarettes daily. She reports a surgical history of appendectomy, cholecystectomy and . The patient denies being on antibiotics recently.     REVIEW OF SYSTEMS  Review of Systems   Constitutional: Positive for fever.   HENT: Negative for congestion and sore throat.    Respiratory: Positive for shortness of breath.    Cardiovascular: Negative for chest pain.   Gastrointestinal: Positive for nausea, vomiting, abdominal pain and diarrhea. Negative for blood in stool and melena.        No hematemesis    Genitourinary: Negative for dysuria.        Vaginal bleeding    Musculoskeletal: Positive for myalgias.   All other systems " "reviewed and are negative.    PAST MEDICAL HISTORY   has a past medical history of CHEST PAIN (8/8/2012); Hypercholesterolemia (8/8/2012); and Acid reflux disease (8/8/2012).    SURGICAL HISTORY   has past surgical history that includes tonsillectomy; cholecystectomy (2009); and appendectomy.    SOCIAL HISTORY  Social History   Substance Use Topics   • Smoking status: Current Every Day Smoker -- 0.50 packs/day     Types: Cigarettes   • Smokeless tobacco: Never Used   • Alcohol Use: Yes      Comment: RARELY      History   Drug Use No     FAMILY HISTORY  No family history noted    CURRENT MEDICATIONS  No current facility-administered medications on file prior to encounter.     No current outpatient prescriptions on file prior to encounter.     ALLERGIES  Allergies   Allergen Reactions   • Bee Anaphylaxis   • Augmentin Rash     Pt states \"I get a bad rash\".     • Latex Rash     Pt states \"I get a bad rash\".   • Other Misc Rash     Nail acrylic- Pt states \"I get a bad rash\".   • Tape Rash     Pt states \"I get a bad rash\".  Paper tape ok     PHYSICAL EXAM  VITAL SIGNS: /76 mmHg  Pulse 145  Temp(Src) 37.1 °C (98.8 °F) (Temporal)  Resp 16  Ht 1.6 m (5' 2.99\")  Wt 97 kg (213 lb 13.5 oz)  BMI 37.89 kg/m2  SpO2 96%    Constitutional:   No acute distress  HENT:  Moist mucous membranes  Eyes:  No conjunctivitis or icterus  Neck:  trachea is midline, no palpable thyroid  Lymphatic:  No cervical lymphadenopathy  Cardiovascular: Tachycardic rate and regular rhythm, no murmurs  Thorax & Lungs:  Normal breath sounds, no rhonchi  Abdomen:  Soft, moderate right lower quadrant tenderness   : Pelvic exam performed by myself with Meaghan present. Minimal cervical motion tenderness, no discharge, spotting.   Skin:.  no rash  Back:  Non-tender, no CVA tenderness  Extremities: Trace 1+ edema  Vascular:  symmetric radial pulse  Neurologic:  Normal gross motor    LABS  All labs reviewed by me..    Results for orders placed or " performed during the hospital encounter of 01/02/17   CBC WITH DIFFERENTIAL   Result Value Ref Range    WBC 22.4 (H) 4.8 - 10.8 K/uL    RBC 5.39 4.20 - 5.40 M/uL    Hemoglobin 16.9 (H) 12.0 - 16.0 g/dL    Hematocrit 48.1 (H) 37.0 - 47.0 %    MCV 89.2 81.4 - 97.8 fL    MCH 31.4 27.0 - 33.0 pg    MCHC 35.1 (H) 33.6 - 35.0 g/dL    RDW 39.8 35.9 - 50.0 fL    Platelet Count 235 164 - 446 K/uL    MPV 10.7 9.0 - 12.9 fL    Neutrophils-Polys 93.00 (H) 44.00 - 72.00 %    Lymphocytes 3.00 (L) 22.00 - 41.00 %    Monocytes 2.90 0.00 - 13.40 %    Eosinophils 0.10 0.00 - 6.90 %    Basophils 0.60 0.00 - 1.80 %    Immature Granulocytes 0.40 0.00 - 0.90 %    Nucleated RBC 0.00 /100 WBC    Neutrophils (Absolute) 20.85 (H) 2.00 - 7.15 K/uL    Lymphs (Absolute) 0.67 (L) 1.00 - 4.80 K/uL    Monos (Absolute) 0.64 0.00 - 0.85 K/uL    Eos (Absolute) 0.02 0.00 - 0.51 K/uL    Baso (Absolute) 0.13 (H) 0.00 - 0.12 K/uL    Immature Granulocytes (abs) 0.10 0.00 - 0.11 K/uL    NRBC (Absolute) 0.00 K/uL   COMP METABOLIC PANEL   Result Value Ref Range    Sodium 133 (L) 135 - 145 mmol/L    Potassium 4.0 3.6 - 5.5 mmol/L    Chloride 107 96 - 112 mmol/L    Co2 15 (L) 20 - 33 mmol/L    Anion Gap 11.0 0.0 - 11.9    Glucose 128 (H) 65 - 99 mg/dL    Bun 20 8 - 22 mg/dL    Creatinine 0.72 0.50 - 1.40 mg/dL    Calcium 9.4 8.5 - 10.5 mg/dL    AST(SGOT) 20 12 - 45 U/L    ALT(SGPT) 28 2 - 50 U/L    Alkaline Phosphatase 56 30 - 99 U/L    Total Bilirubin 0.5 0.1 - 1.5 mg/dL    Albumin 4.5 3.2 - 4.9 g/dL    Total Protein 7.2 6.0 - 8.2 g/dL    Globulin 2.7 1.9 - 3.5 g/dL    A-G Ratio 1.7 g/dL   LIPASE   Result Value Ref Range    Lipase 10 (L) 11 - 82 U/L   URINALYSIS   Result Value Ref Range    Micro Urine Req Microscopic     Color Yellow     Character Cloudy (A)     Specific Gravity 1.025 <1.035    Ph 5.5 5.0-8.0    Glucose Negative Negative mg/dL    Ketones Negative Negative mg/dL    Protein Negative Negative mg/dL    Bilirubin Negative Negative    Nitrite  Negative Negative    Leukocyte Esterase Negative Negative    Occult Blood Moderate (A) Negative   Lactic acid (lactate)   Result Value Ref Range    Lactic Acid 2.0 0.5 - 2.0 mmol/L   Lactic acid (lactate)   Result Value Ref Range    Lactic Acid 1.6 0.5 - 2.0 mmol/L   ESTIMATED GFR   Result Value Ref Range    GFR If African American >60 >60 mL/min/1.73 m 2    GFR If Non African American >60 >60 mL/min/1.73 m 2   URINE MICROSCOPIC (W/UA)   Result Value Ref Range    Epithelial Cells Few /hpf    Mucous Threads Few /hpf    Amorphous Crystal Present /hpf   BETA-HCG QUALITATIVE SERUM   Result Value Ref Range    Beta-Hcg Qualitative Serum Negative Negative   CDIFF BY PCR   Result Value Ref Range    C Diff by PCR Negative Negative    027-NAP1-BI Presumptive Negative Negative   WET PREP   Result Value Ref Range    Significant Indicator NEG     Source GEN     Site VAGINAL     Wet Prep For Parasites       Rare WBC's seen.  Few clue cells seen.  No motile Trichomonas seen.  No yeast.     CHLAMYDIA & GC BY PCR   Result Value Ref Range    Source Vaginal    EKG (NOW)   Result Value Ref Range    Report       [facility] Emergency Dept[facility]    Test Date:  2017  Pt Name:    KAMAR BLACKBURN             Department: ER  MRN:        [id number]                      Room:  Gender:     F                            Technician: [id number]  :        [id number]                   Requested By:ER TRIAGE PROTOCOL  Order #:    [id number]                    Reading MD:    Measurements  Intervals                                Axis  Rate:       131                          P:          33  GA:         148                          QRS:        21  QRSD:       72                           T:          6  QT:         284  QTc:        420    Interpretive Statements  SINUS TACHYCARDIA  No previous ECG available for comparison          EKG Interpretation  Interpreted by me  Sinus Tachycardia. Rate 131  Normal corrected QTc  normal QRS  normal  Axis  No previous EKG for comparison    RADIOLOGY  CT-ABDOMEN-PELVIS WITH   Final Result      1.  3.3 cm right ovarian cyst.      2.  Air-fluid levels noted in colon and small bowel without significant dilatation. Gastroenteritis is a possibility.         DX-CHEST-LIMITED (1 VIEW)   Final Result      1.  No acute cardiac or pulmonary abnormalities are identified.   2.  There is minimal bibasilar atelectasis.      The radiologist's interpretation of all radiological studies have been reviewed by me.    COURSE & MEDICAL DECISION MAKING  Pertinent Labs & Imaging studies reviewed. (See chart for details)    4:12 PM - Patient seen and examined at bedside. Patient will be treated with 1L NS infusion IV. Ordered chest x-ray, abdominal CT Lactic acid, Beta HCG qual, Urinalysis, Urine culture, Blood cultures, c.diff by PCR, POC urine pregnancy, CBC with diff, CMP, Lipase, POC urinalysis, Estimated GFR, Wet prep, chlamydia and GC by PCR, Urine microscopic to evaluate her symptoms. The differential diagnoses include but are not limited to: tachycardia, high white count, rule out C.diff  And gastroenteritis    6:37 PM- Recheck on the patient. She reports that her pain is still present. I updated her on the CT findings. Nursing is preparing the cart for a pelvic exam.     6:44 PM- Performing pelvic exam now with female nurse, Meaghan, present.     7:19 PM I discussed the patient's case and the above findings with Dr. New (hospitalist) who agrees to admit the patient.     Medical Decision Making:  Patient has multiple symptoms including vomiting diarrhea without blood and intermittent abdominal discomfort cramping tachycardia abnormal vaginal bleeding. I ruled out ectopic pregnancy. We had GC chlamydia PCR sent off. She had no cervical motion tenderness.    She has markedly elevated white count left shift labs also show a low CO2 at 15. It is unclear what is exactly the cause of patient's symptoms however she has discomfort  high white count and a persistent tachycardia. I contacted the hospitalist patient is being admitted for further evaluation and treatment    DISPOSITION:  Patient will be admitted to Dr. New (hospitalist) in stable condition.    FINAL IMPRESSION  1. Abdominal pain, unspecified location    2. Vomiting without nausea, intractability of vomiting not specified, unspecified vomiting type    3. Diarrhea, unspecified type        I, Deb Mcdonald (Scribe), am scribing for, and in the presence of, Shawn Landon M.D..    Electronically signed by: Deb Mcdonald (Scribe), 1/2/2017    I, Shawn Landon M.D. personally performed the services described in this documentation, as scribed by Deb Mcdonald in my presence, and it is both accurate and complete.    The note accurately reflects work and decisions made by me.  Shawn Landon  1/2/2017  8:38 PM

## 2017-01-03 NOTE — ED NOTES
Pelvic procedure completed. Pt. Tolerated procedure well. Family at bedside. Will continue to monitor.

## 2017-01-03 NOTE — H&P
PRIMARY CARE PHYSICIAN:  Kelvin Mcclure MD    CHIEF COMPLAINT:  Nausea, vomiting and diarrhea.    HISTORY OF PRESENT ILLNESS:  Patient is a 41-year-old white female who states   that 2 weeks ago she had some abdominal pain, nausea, vomiting, and diarrhea,   which lasted for a few days and then went away.  However, 3 days ago she   started having the symptoms again o abdominal pain, again nausea, vomiting   and diarrhea.  She denies having any dysuria.  She has had subjective chills,   but no fever according to her.  She then came in and workup showed C. diff was   noted to be negative.  Her white count was noted to be quite elevated at   61921.  Urinalysis was negative.  Chest x-ray was negative.  Influenza is   still pending.  CT scan abdomen and pelvis shows possible enteritis.  Chest   x-ray was clear.  Pt also states she's had irregular periods for the past several months  and started spotting a week ago, no vaginal discharge. Pelvic exam was done by ER   physician which was noted to have no discharge with spotting only and   mild tenderness. Patient will be admitted for further evaluation and   management.    PAST MEDICAL HISTORY:  Chronic lower extremity edema.    PAST SURGICAL HISTORY:  1.  Tonsillectomy.  2.  Appendectomy.  3.  Cholecystectomy.    FAMILY HISTORY:  Reviewed and noncontributory.    SOCIAL HISTORY:  Patient is a current smoker, half a pack a day, rare alcohol   drinker, no illicit drug use.    ALLERGIES:  BEES, AUGMENTIN, LATEX, ACRYLIC AND TAPE.    CURRENT MEDICATIONS:  1.  Lasix 20 mg per day.  2.  K-Dur 10 mEq per day.  3.  Motrin 600 mg every 6 hours as needed for pain.  4.  Ascorbic acid daily.    REVIEW OF SYSTEMS:  Negative except for those stated above reviewed per AMA   criteria.    PHYSICAL EXAMINATION:  VITAL SIGNS:  Blood pressure of 105/67, pulse rate of 120, respiratory rate   20, temperature of 98.9 degrees Fahrenheit, O2 sat of 95% on room air.  GENERAL:  Middle-aged white  female lying in bed in no acute distress, alert   and oriented x3.  HEENT:  Normocephalic, atraumatic.  Pupils are equally reactive and responds   to light.  EOMs are intact.  Oropharynx is moist and clear.  NECK:  Shows no thyromegaly, lymphadenopathy or carotid bruits.  CHEST:  Symmetrical chest expansion.  Clear to auscultation bilaterally.  CARDIOVASCULAR:  Regular rate and rhythm.  S1, S2 distinct.  There is no S3,   no murmurs appreciated.  ABDOMEN:  Normoactive bowel sounds, soft, nontender, nondistended.  No rebound   or rigidity.  EXTREMITIES:  Shows no clubbing, no cyanosis, +1 edema.  Pulses +2.  NEUROLOGIC:  Cranial nerves II-XII intact.  No gross motor or sensory   deficits.    LABORATORY DATA:  WBC is 22.4, hemoglobin 16.9, hematocrit 48.1, platelet   count 235.  Sodium 132, potassium 4.0, glucose 138, creatinine 0.72.  Lactic   acid 2.0.    IMAGING:  Chest x-ray is clear.  CT scan abdomen and pelvis shows 3.3 cm right   ovarian cyst with air fluid levels in the colon and small bowel.  No   significant ____ gastroenteritis is a possibility.  Chest x-ray is clear.  EKG   shows sinus tachycardia.    ASSESSMENT AND PLAN:  1.  Sepsis.  Unclear source.  We will cover empirically with antibiotics in   the form of ciprofloxacin and Flagyl secondary to possible enteritis.  2.  Possible enteritis.  Again cover her empirically with intravenous Cipro   and Flagyl.  3.  Lower extremity edema.  She will be offered Lasix and K-Dur for now.  4.  Tobacco abuse.  Place the patient on nicotine patch.  5.  Prophylaxis.  Place the patient on sequential compression devices.  6.  Code status is full.       ____________________________________     MD MARY REED / WILBERT    DD:  01/03/2017 00:27:17  DT:  01/03/2017 00:50:11    D#:  [id number]  Job#:  [id number]

## 2017-01-03 NOTE — ED NOTES
Floor called and notified of transport, report to MUKESH [place].  Bria Martinez transported to T8 via gurney with transport on remote monitor. All personal belongings in possession.  NAD noted.

## 2017-01-03 NOTE — PROGRESS NOTES
Assumed care at 0715. Bedside report received from Night RN Stanislaw. Patient's chart and MAR reviewed. 12 hour chart check complete. Assessment complete, pt complains of lower back and headach pain at this time, medicated per MAR. Pt is awake in bed. Pt is A & O x 4. Patient was updated on plan of care for the day. Questions answered and concerns addressed.  Pt denies any additional needs at this time. White board updated. Call light, phone and personal belongings within reach. Bed alarm on and working appropriately. Patient hypotensive, asymptomatic, states she runs low. IV fluids in place, MD aware.

## 2017-01-03 NOTE — PROGRESS NOTES
2 RN skin assessment complete with MUKESH Herrera. Pt has generalized flushed skin but blanchable. Other wise skin is intact.

## 2017-01-03 NOTE — PROGRESS NOTES
Pt ambulated from rney to bed with SBA. Pt has antibiotics running as well as fluid bolus for sepsis protocol. Pt A&O x4 with c/o of body aches. Pt oriented to room, call system and hospital policies. Pt verbalizes understanding. Pt educated to indication of current POC and pt verbalizes understanding. Pt vital signs are 97.2F, 116 HR, 18 Resp, 87/66 BP, 95% O2 on RA. Telemetry monitor placed and monitor room called to verify. Call light in place and fall precautions in place.

## 2017-01-03 NOTE — ED NOTES
"Med rec updated and complete  Allergies reviewed  Pt states \"No antibiotics in the last 30 days\".  Pt states \"Not sure of the name of my water pill or the strength of mu potassium\".  Called [facility] @[phone] to verify medications.     "

## 2017-01-03 NOTE — PROGRESS NOTES
Hospital Medicine Progress Note, Adult, Complex               Author: Edward ANNIE Scout Date & Time created: 1/3/2017  7:17 AM     41/F with chronic LE edema, admitted for nausea, vomiting, diarrhea. CT showed possible enteritis, with note of ovarian cyst. C diff and flu was negative. Lactate 2.9, improved to 0.9 with IVF. Started on cipro and flagyl.       Interval History:  1/3/2017 - Admitted yesterday (1/2/16). No overnight events. Hypotensive yesterday, but BP better this morning. Remains hemodynamically stable and afebrile. Saturating well on RA. Mg 1.5.     > Seen and examined. Still feeling nauseaus, and had 2-3 episodes of diarrhea. Tolerating PO diet. (+) headaches, and chronic back pain. No CP, SOB, abd pain.       Review of Systems:  ROS  Pertinent positives/negatives as mentioned above.     A complete review of systems was done. All other systems were negative.       Physical Exam:  Physical Exam   Constitutional: She is oriented to person, place, and time. She appears well-developed and well-nourished. No distress.   HENT:   Head: Normocephalic and atraumatic.   Mouth/Throat: No oropharyngeal exudate.   Eyes: Conjunctivae are normal. Pupils are equal, round, and reactive to light. No scleral icterus.   Neck: Normal range of motion. Neck supple.   Cardiovascular: Normal rate and regular rhythm.  Exam reveals no gallop and no friction rub.    No murmur heard.  Pulmonary/Chest: Effort normal and breath sounds normal. No respiratory distress. She has no wheezes. She has no rales. She exhibits no tenderness.   Abdominal: Soft. Bowel sounds are normal. She exhibits no distension. There is no tenderness. There is no rebound and no guarding.   Musculoskeletal: Normal range of motion. She exhibits no edema or tenderness.   Lymphadenopathy:     She has no cervical adenopathy.   Neurological: She is alert and oriented to person, place, and time. No cranial nerve deficit.   Skin: Skin is warm and dry. No rash noted.  No erythema. No pallor.   Psychiatric: She has a normal mood and affect. Her behavior is normal. Judgment and thought content normal.   Nursing note and vitals reviewed.        Labs:        Invalid input(s): [id number]      Recent Labs      17   1456  17   2341   SODIUM  133*  133*   POTASSIUM  4.0  3.1*   CHLORIDE  107  107   CO2  15*  18*   BUN  20  14   CREATININE  0.72  0.71   CALCIUM  9.4  7.8*     Recent Labs      17   1456  17   2341   ALTSGPT  28  25   ASTSGOT  20  21   ALKPHOSPHAT  56  44   TBILIRUBIN  0.5  0.5   LIPASE  10*   --    GLUCOSE  128*  103*     Recent Labs      17   1456  17   2341   RBC  5.39  4.42   HEMOGLOBIN  16.9*  13.6   HEMATOCRIT  48.1*  39.9   PLATELETCT  235  193   APTT   --   26.2     Recent Labs      17   1456  17   2341   WBC  22.4*  13.1*   NEUTSPOLYS  93.00*  83.10*   LYMPHOCYTES  3.00*  10.70*   MONOCYTES  2.90  5.30   EOSINOPHILS  0.10  0.20   BASOPHILS  0.60  0.50   ASTSGOT  20  21   ALTSGPT  28  25   ALKPHOSPHAT  56  44   TBILIRUBIN  0.5  0.5           Hemodynamics:  Temp (24hrs), Av.6 °C (97.8 °F), Min:36.2 °C (97.2 °F), Max:37.1 °C (98.8 °F)  Temperature: 36.5 °C (97.7 °F)  Pulse  Av.9  Min: 98  Max: 145Heart Rate (Monitored): (!) 120  Blood Pressure: (!) 98/62 mmHg (RN notified), NIBP: (!) 80/56 mmHg     Respiratory:    Respiration: 18, Pulse Oximetry: 94 %     Work Of Breathing / Effort: Mild  RUL Breath Sounds: Clear, RML Breath Sounds: Clear, RLL Breath Sounds: Diminished, ANDREW Breath Sounds: Clear, LLL Breath Sounds: Diminished  Fluids:    Intake/Output Summary (Last 24 hours) at 17 0717  Last data filed at 17 0400   Gross per 24 hour   Intake      0 ml   Output    200 ml   Net   -200 ml     Weight: 102.9 kg (226 lb 13.7 oz)  GI/Nutrition:  Orders Placed This Encounter   Procedures   • Diet Order     Standing Status: Standing      Number of Occurrences: 1      Standing Expiration Date:      Order  Specific Question:  Diet:     Answer:  Clear Liquid [10]     Medical Decision Making, by Problem:  Active Hospital Problems    Diagnosis   •   Sepsis (HCC) due to enteritis   - continue IVF NS at 150cc/hr. Continue close hemodynamic monitoring, with PRN IVF boluses for low BP. Monitor for fever.   - continue cipro and flagyl.       Enteritis - continue cipro and flagyl. Continue PRN antiemetics.       Ovarian cyst - outpatient follow-up with gynecology.       Hypokalemia - replaced. Recheck BMP and replace if still low. Replace magnesium deficit.       Hypomagnesemia - will give 2g magnesium sulfate. Recheck Mg level in AM.       Tobacco dependence - continue nicoderm patch.          Labs reviewed, Medications reviewed and Radiology images reviewed  Dc catheter: No Dc      DVT Prophylaxis: Heparin    Ulcer prophylaxis: Not indicated  Antibiotics: Treating active infection/contamination beyond 24 hours perioperative coverage

## 2017-01-03 NOTE — CARE PLAN
Problem: Safety  Goal: Will remain free from injury  Intervention: Provide assistance with mobility  Pt educated and oriented to room and call system. Pt verbalized understanding.       Problem: Knowledge Deficit  Goal: Knowledge of disease process/condition, treatment plan, diagnostic tests, and medications will improve  Intervention: Explain information regarding disease process/condition, treatment plan, diagnostic tests, and medications and document in education  Pt educated to s/s of disease process. Pt educated to the sepsis protocol. Pt verbalized understanding.

## 2017-01-04 ENCOUNTER — APPOINTMENT (OUTPATIENT)
Dept: RADIOLOGY | Facility: MEDICAL CENTER | Age: 42
DRG: 872 | End: 2017-01-04
Attending: INTERNAL MEDICINE
Payer: COMMERCIAL

## 2017-01-04 VITALS
TEMPERATURE: 97.5 F | BODY MASS INDEX: 42.34 KG/M2 | WEIGHT: 238.98 LBS | HEART RATE: 65 BPM | SYSTOLIC BLOOD PRESSURE: 112 MMHG | HEIGHT: 63 IN | OXYGEN SATURATION: 97 % | RESPIRATION RATE: 18 BRPM | DIASTOLIC BLOOD PRESSURE: 70 MMHG

## 2017-01-04 PROBLEM — E83.42 HYPOMAGNESEMIA: Status: RESOLVED | Noted: 2017-01-03 | Resolved: 2017-01-04

## 2017-01-04 PROBLEM — A41.9 SEPSIS (HCC): Status: RESOLVED | Noted: 2017-01-02 | Resolved: 2017-01-04

## 2017-01-04 PROBLEM — K52.9 ENTERITIS: Status: RESOLVED | Noted: 2017-01-03 | Resolved: 2017-01-04

## 2017-01-04 PROBLEM — E87.6 HYPOKALEMIA: Status: RESOLVED | Noted: 2017-01-03 | Resolved: 2017-01-04

## 2017-01-04 LAB
ANION GAP SERPL CALC-SCNC: 4 MMOL/L (ref 0–11.9)
BACTERIA UR CULT: NORMAL
BASOPHILS # BLD AUTO: 0.8 % (ref 0–1.8)
BASOPHILS # BLD: 0.05 K/UL (ref 0–0.12)
BUN SERPL-MCNC: 6 MG/DL (ref 8–22)
CALCIUM SERPL-MCNC: 7.5 MG/DL (ref 8.5–10.5)
CHLORIDE SERPL-SCNC: 113 MMOL/L (ref 96–112)
CO2 SERPL-SCNC: 20 MMOL/L (ref 20–33)
CREAT SERPL-MCNC: 0.56 MG/DL (ref 0.5–1.4)
EKG IMPRESSION: NORMAL
EOSINOPHIL # BLD AUTO: 0.14 K/UL (ref 0–0.51)
EOSINOPHIL NFR BLD: 2.2 % (ref 0–6.9)
ERYTHROCYTE [DISTWIDTH] IN BLOOD BY AUTOMATED COUNT: 43.3 FL (ref 35.9–50)
GFR SERPL CREATININE-BSD FRML MDRD: >60 ML/MIN/1.73 M 2
GLUCOSE SERPL-MCNC: 86 MG/DL (ref 65–99)
HCT VFR BLD AUTO: 35.1 % (ref 37–47)
HGB BLD-MCNC: 11.8 G/DL (ref 12–16)
IMM GRANULOCYTES # BLD AUTO: 0.01 K/UL (ref 0–0.11)
IMM GRANULOCYTES NFR BLD AUTO: 0.2 % (ref 0–0.9)
LV EJECT FRACT  99904: 65
LV EJECT FRACT MOD 2C 99903: 64.59
LV EJECT FRACT MOD 4C 99902: 77.21
LV EJECT FRACT MOD BP 99901: 69.77
LYMPHOCYTES # BLD AUTO: 2.5 K/UL (ref 1–4.8)
LYMPHOCYTES NFR BLD: 38.4 % (ref 22–41)
MAGNESIUM SERPL-MCNC: 2 MG/DL (ref 1.5–2.5)
MCH RBC QN AUTO: 31.4 PG (ref 27–33)
MCHC RBC AUTO-ENTMCNC: 33.6 G/DL (ref 33.6–35)
MCV RBC AUTO: 93.4 FL (ref 81.4–97.8)
MONOCYTES # BLD AUTO: 0.83 K/UL (ref 0–0.85)
MONOCYTES NFR BLD AUTO: 12.7 % (ref 0–13.4)
NEUTROPHILS # BLD AUTO: 2.98 K/UL (ref 2–7.15)
NEUTROPHILS NFR BLD: 45.7 % (ref 44–72)
NRBC # BLD AUTO: 0 K/UL
NRBC BLD AUTO-RTO: 0 /100 WBC
PLATELET # BLD AUTO: 160 K/UL (ref 164–446)
PMV BLD AUTO: 11 FL (ref 9–12.9)
POTASSIUM SERPL-SCNC: 3.5 MMOL/L (ref 3.6–5.5)
RBC # BLD AUTO: 3.76 M/UL (ref 4.2–5.4)
SIGNIFICANT IND 70042: NORMAL
SITE SITE: NORMAL
SODIUM SERPL-SCNC: 137 MMOL/L (ref 135–145)
SOURCE SOURCE: NORMAL
TROPONIN I SERPL-MCNC: <0.01 NG/ML (ref 0–0.04)
WBC # BLD AUTO: 6.5 K/UL (ref 4.8–10.8)

## 2017-01-04 PROCEDURE — 93010 ELECTROCARDIOGRAM REPORT: CPT | Performed by: INTERNAL MEDICINE

## 2017-01-04 PROCEDURE — 85025 COMPLETE CBC W/AUTO DIFF WBC: CPT

## 2017-01-04 PROCEDURE — 700111 HCHG RX REV CODE 636 W/ 250 OVERRIDE (IP): Performed by: FAMILY MEDICINE

## 2017-01-04 PROCEDURE — 700101 HCHG RX REV CODE 250: Performed by: FAMILY MEDICINE

## 2017-01-04 PROCEDURE — 36415 COLL VENOUS BLD VENIPUNCTURE: CPT

## 2017-01-04 PROCEDURE — 99239 HOSP IP/OBS DSCHRG MGMT >30: CPT | Performed by: INTERNAL MEDICINE

## 2017-01-04 PROCEDURE — A9270 NON-COVERED ITEM OR SERVICE: HCPCS | Performed by: FAMILY MEDICINE

## 2017-01-04 PROCEDURE — 80048 BASIC METABOLIC PNL TOTAL CA: CPT

## 2017-01-04 PROCEDURE — 700102 HCHG RX REV CODE 250 W/ 637 OVERRIDE(OP): Performed by: FAMILY MEDICINE

## 2017-01-04 PROCEDURE — 93306 TTE W/DOPPLER COMPLETE: CPT | Mod: 26 | Performed by: INTERNAL MEDICINE

## 2017-01-04 PROCEDURE — 93005 ELECTROCARDIOGRAM TRACING: CPT | Performed by: NURSE PRACTITIONER

## 2017-01-04 PROCEDURE — 71010 DX-CHEST-PORTABLE (1 VIEW): CPT

## 2017-01-04 PROCEDURE — 83735 ASSAY OF MAGNESIUM: CPT

## 2017-01-04 PROCEDURE — 93306 TTE W/DOPPLER COMPLETE: CPT

## 2017-01-04 PROCEDURE — 700105 HCHG RX REV CODE 258: Performed by: FAMILY MEDICINE

## 2017-01-04 PROCEDURE — 84484 ASSAY OF TROPONIN QUANT: CPT

## 2017-01-04 RX ORDER — INDOMETHACIN 25 MG/1
25 CAPSULE ORAL
Qty: 9 CAP | Refills: 0 | Status: SHIPPED | OUTPATIENT
Start: 2017-01-04 | End: 2017-01-07

## 2017-01-04 RX ORDER — POTASSIUM CHLORIDE 20 MEQ/1
10 TABLET, EXTENDED RELEASE ORAL DAILY
Qty: 30 TAB | Refills: 1 | Status: ON HOLD | OUTPATIENT
Start: 2017-01-04 | End: 2018-06-04

## 2017-01-04 RX ORDER — INDOMETHACIN 25 MG/1
25 CAPSULE ORAL
Status: DISCONTINUED | OUTPATIENT
Start: 2017-01-04 | End: 2017-01-04 | Stop reason: HOSPADM

## 2017-01-04 RX ORDER — METRONIDAZOLE 500 MG/1
500 TABLET ORAL EVERY 8 HOURS
Status: DISCONTINUED | OUTPATIENT
Start: 2017-01-04 | End: 2017-01-04 | Stop reason: HOSPADM

## 2017-01-04 RX ORDER — CIPROFLOXACIN 500 MG/1
500 TABLET, FILM COATED ORAL 2 TIMES DAILY
Qty: 10 TAB | Refills: 0 | Status: SHIPPED | OUTPATIENT
Start: 2017-01-04 | End: 2017-01-09

## 2017-01-04 RX ORDER — CIPROFLOXACIN 500 MG/1
500 TABLET, FILM COATED ORAL EVERY 12 HOURS
Status: DISCONTINUED | OUTPATIENT
Start: 2017-01-04 | End: 2017-01-04 | Stop reason: HOSPADM

## 2017-01-04 RX ORDER — METRONIDAZOLE 500 MG/1
500 TABLET ORAL EVERY 8 HOURS
Qty: 15 TAB | Refills: 0 | Status: SHIPPED | OUTPATIENT
Start: 2017-01-04 | End: 2017-01-09

## 2017-01-04 RX ADMIN — CIPROFLOXACIN 400 MG: 2 INJECTION, SOLUTION INTRAVENOUS at 08:32

## 2017-01-04 RX ADMIN — POTASSIUM CHLORIDE 20 MEQ: 1500 TABLET, EXTENDED RELEASE ORAL at 08:32

## 2017-01-04 RX ADMIN — SODIUM CHLORIDE: 9 INJECTION, SOLUTION INTRAVENOUS at 06:34

## 2017-01-04 RX ADMIN — METRONIDAZOLE 500 MG: 500 INJECTION, SOLUTION INTRAVENOUS at 06:36

## 2017-01-04 ASSESSMENT — PAIN SCALES - GENERAL
PAINLEVEL_OUTOF10: 0

## 2017-01-04 ASSESSMENT — LIFESTYLE VARIABLES: EVER_SMOKED: YES

## 2017-01-04 NOTE — CARE PLAN
Problem: Communication  Goal: The ability to communicate needs accurately and effectively will improve  Outcome: PROGRESSING AS EXPECTED  Pt able to communicate needs effectively.     Problem: Pain Management  Goal: Pain level will decrease to patient’s comfort goal  Outcome: PROGRESSING AS EXPECTED  Pt assessed for pain Q4h and medicated PRN. Pt instructed to notify RN of any new or increasing pain to prevent it from becoming intolerable. Verbalized understanding.

## 2017-01-04 NOTE — CARE PLAN
Problem: Safety  Goal: Will remain free from injury  Outcome: PROGRESSING AS EXPECTED  Pt remains free from falls or injuries. Pt up self. Calls for assistance appropriately.     Problem: Venous Thromboembolism (VTW)/Deep Vein Thrombosis (DVT) Prevention:  Goal: Patient will participate in Venous Thrombosis (VTE)/Deep Vein Thrombosis (DVT)Prevention Measures  Outcome: PROGRESSING AS EXPECTED  Pt free from s/sx of DVT. Pt using SCDs for VTE prophylaxis.

## 2017-01-04 NOTE — PROGRESS NOTES
"Paged hospitalist Dr Sherron Andino for pt's chest pain. Patient described pain as increased upon inspiration, nonradiating. Pt described pain as \"it feels like it's my bronchioles.\" Breath sounds clear with diminished in lower lung fields bilaterally. VSS, except for BP at 100/77, which has been patient's normal since receiving fluid boluses. Pt also states her baseline is low. Discussed situation with charge nurse. Incentive spirometer brought into room for patient and demonstrated how to use. New orders received from hospitalist for STAT EKG and troponin with morning labs. RT ordered per protocol. No other orders received. Continuing to monitor patient.   "

## 2017-01-04 NOTE — DISCHARGE SUMMARY
HOSPITAL MEDICINE DISCHARGE SUMMARY    Name: Bria Martinez  MRN: [id number]  : 1975  Admit Date: 2017  Discharge Date: 2017  Attending Provider: Edward Butterfield M.D.  Primary Care Physician: Kelvin Pearson M.D.    DISCHARGE DIAGNOSES:   Active Problems:    Ovarian cyst POA: Yes    Hypercholesterolemia POA: Yes    Tobacco dependence POA: Yes  Resolved Problems:    Sepsis (HCC) due to enteritis POA: Yes    Enteritis POA: Yes    Hypokalemia POA: Yes    Hypomagnesemia POA: Yes      SUMMARY OF EVENTS LEADING TO ADMISSION:  This is a 41-year-old female with    chronic lower extremity edema, admitted for nausea, vomiting, and diarrhea.     For further details of history of present illness, past medical, social,    family history, allergies, and medication, please refer to the admission H and   P by Dr. Mckinley New done on 2017.     HOSPITAL COURSE:  The patient was admitted to the hospitalist service after    being initially evaluated in the emergency department where a CT of the    abdomen and pelvis showed a possible enteritis, with noted ovarian cyst.     Clostridium difficile and flu tests were obtained, which were both negative.     The lactate was initially elevated at 2.9, and she was started on IV fluids    and the lactate level improved to 0.9.  She was started on ciprofloxacin and    Flagyl for her enteritis.  She was started on clear liquid diet, and she was    able to be advanced on her diet, which she tolerated well.  Her diarrhea also    resolved, with improvement in her nausea.     She did have complaints of pleuritic chest pain, which was felt to be related    to her possible systemic viral illness.  Echocardiogram was obtained, which    did not show any pericardial effusion with normal pericardium, ejection    fraction of 65%, with trace aortic insufficiency and trace mitral    regurgitation.     She remained hemodynamically stable and afebrile.  Her  hospital course was    only notable for hypokalemia and hypomagnesemia on labs, which were both    replaced.  With her clinical improvement, she was deemed ready to be    discharged from the hospital as she did not have any further inpatient needs.     The patient felt comfortable going home.  The discharge plan was discussed    with the patient and she was agreeable to it.     The patient was subsequently sent home in improved and stable condition.           FOLLOW-UP ISSUES:   1. Enteritis - likely viral but will continue PO ciprofloxacin and flagyl to complete 7 days course.     2. Pleuritic chest pain - likely related to pericarditis/pleuritis, likely due to systemic viral illness.   Continue indomethacin x 3 days, then PRN NSAID thereafter. Follow-up with PCP in 1-2 weeks.     CHANGES IN MANAGEMENT OF CHRONIC CONDITION: As above.     PENDING TESTS: none    FOLLOW-UP TESTS ORDERED POST DISCHARGE: none    DISCHARGE MEDICATIONS:   Medication Sig   indomethacin (INDOCIN) 25 MG Cap Take 1 Cap by mouth 3 times a day, with meals for 3 days.   potassium chloride SA (K-DUR) 20 MEQ Tab CR Take 0.5 Tabs by mouth every day.   ciprofloxacin (CIPRO) 500 MG Tab Take 1 Tab by mouth 2 times a day for 5 days.   metronidazole (FLAGYL) 500 MG Tab Take 1 Tab by mouth every 8 hours for 5 days.   EPINEPHrine (EPIPEN 2-MALRYN) 0.3 MG/0.3ML Solution Auto-injector solution for injection 0.3 mg by Intramuscular route Once. Indications: Life-Threatening Allergic Reaction   CALCIUM-MAGNESIUM-ZINC PO Take 1 Tab by mouth every day.   ibuprofen (MOTRIN) 200 MG Tab Take 600 mg by mouth every 6 hours as needed for Mild Pain.   furosemide (LASIX) 20 MG Tab Take 20 mg by mouth as needed. Indications: Edema   Ascorbic Acid (VITAMIN C) 500 MG Chew Tab Take 1,500 mg by mouth every day.          FOLLOW-UP APPOINTMENTS:   1. PCP in 1-2 weeks.       For further details on discharge medications, patient education, diet, and activity, please refer to  electronic copy of discharge instructions.       TIME SPENT: 40 minutes, with greater than 50% of the time spent on face-to-face encounter, addressing medical issues, coordination of care, counseling, discharge planning, medication reconciliation, and documentation.

## 2017-01-04 NOTE — PROGRESS NOTES
Pt care assumed. Pt lying comfortably in bed. A&O x 4. No distress present. Call light, phone, and bedside table within reach. White board updated and plan of care discussed with patient. Pt up self. No concerns present at this time. Will continue to monitor.

## 2017-01-04 NOTE — PROGRESS NOTES
Assessment completed. Patient A&O x 4. Pt has no complaints of pain or nausea at this time. Pt states chest hurts only with deep inspiration. POC discussed, IV ABX and fluids running per orders. Call light & bedside table within reach. Bed locked and in lowest position. Bed alarm on and fall precautions in place. Hourly rounding in place.

## 2017-01-04 NOTE — DISCHARGE INSTRUCTIONS
Discharge Instructions    Discharged to home by car with relative. Discharged via wheelchair, hospital escort: Refused.  Special equipment needed: Not Applicable    Be sure to schedule a follow-up appointment with your primary care doctor or any specialists as instructed.     Discharge Plan:   Diet Plan: Discussed  Activity Level: Discussed  Smoking Cessation Offered: Patient Refused  Confirmed Follow up Appointment: Patient to Call and Schedule Appointment  Confirmed Symptoms Management: Discussed  Medication Reconciliation Updated: Yes  Influenza Vaccine Indication: Patient Refuses    I understand that a diet low in cholesterol, fat, and sodium is recommended for good health. Unless I have been given specific instructions below for another diet, I accept this instruction as my diet prescription.   Other diet:     Special Instructions: Sepsis, Adult  Sepsis is a serious infection of your blood or tissues that affects your whole body. The infection that causes sepsis may be bacterial, viral, fungal, or parasitic. Sepsis may be life threatening. Sepsis can cause your blood pressure to drop. This may result in shock. Shock causes your central nervous system and your organs to stop working correctly.   RISK FACTORS  Sepsis can happen in anyone, but it is more likely to happen in people who have weakened immune systems.  SIGNS AND SYMPTOMS   Symptoms of sepsis can include:  · Fever or low body temperature (hypothermia).  · Rapid breathing (hyperventilation).  · Chills.  · Rapid heartbeat (tachycardia).  · Confusion or light-headedness.  · Trouble breathing.  · Urinating much less than usual.  · Cool, clammy skin or red, flushed skin.  · Other problems with the heart, kidneys, or brain.  DIAGNOSIS   Your health care provider will likely do tests to look for an infection, to see if the infection has spread to your blood, and to see how serious your condition is. Tests can include:  · Blood tests, including cultures of  your blood.  · Cultures of other fluids from your body, such as:  ¨ Urine.  ¨ Pus from wounds.  ¨ Mucus coughed up from your lungs.  · Urine tests other than cultures.  · X-ray exams or other imaging tests.  TREATMENT   Treatment will begin with elimination of the source of infection. If your sepsis is likely caused by a bacterial or fungal infection, you will be given antibiotic or antifungal medicines.  You may also receive:  · Oxygen.  · Fluids through an IV tube.  · Medicines to increase your blood pressure.  · A machine to clean your blood (dialysis) if your kidneys fail.  · A machine to help you breathe if your lungs fail.  SEEK IMMEDIATE MEDICAL CARE IF:  You get an infection or develop any of the signs and symptoms of sepsis after surgery or a hospitalization.     This information is not intended to replace advice given to you by your health care provider. Make sure you discuss any questions you have with your health care provider.     Document Released: 09/15/2004 Document Revised: 05/03/2016 Document Reviewed: 08/25/2014  [employer] Interactive Patient Education ©2016 [employer]      · Is patient discharged on Warfarin / Coumadin?   No     · Is patient Post Blood Transfusion?  No    Depression / Suicide Risk    As you are discharged from this [facility] facility, it is important to learn how to keep safe from harming yourself.    Recognize the warning signs:  · Abrupt changes in personality, positive or negative- including increase in energy   · Giving away possessions  · Change in eating patterns- significant weight changes-  positive or negative  · Change in sleeping patterns- unable to sleep or sleeping all the time   · Unwillingness or inability to communicate  · Depression  · Unusual sadness, discouragement and loneliness  · Talk of wanting to die  · Neglect of personal appearance   · Rebelliousness- reckless behavior  · Withdrawal from people/activities they love  · Confusion- inability to  concentrate     If you or a loved one observes any of these behaviors or has concerns about self-harm, here's what you can do:  · Talk about it- your feelings and reasons for harming yourself  · Remove any means that you might use to hurt yourself (examples: pills, rope, extension cords, firearm)  · Get professional help from the community (Mental Health, Substance Abuse, psychological counseling)  · Do not be alone:Call your Safe Contact- someone whom you trust who will be there for you.  · Call your local CRISIS HOTLINE 207-9083 or 154-390-1767  · Call your local Children's Mobile Crisis Response Team [place] (808) 763-3594 or www.[address]  · Call the toll free National Suicide Prevention Hotlines   · National Suicide Prevention Lifeline 571-084-VLSZ (8707)  · National Hope Line Network 800-SUICIDE (538-4802)

## 2017-01-05 LAB
BACTERIA UR CULT: NORMAL
SIGNIFICANT IND 70042: NORMAL
SITE SITE: NORMAL
SOURCE SOURCE: NORMAL

## 2017-01-05 NOTE — PROGRESS NOTES
Pt d/c'd per MD's orders. New prescription scripts received from MD. Pt educated on f/u appointment and on new medications. Tele off, tech notified. IV d/c'd tip intact. Vaccines refused. Pt off unit with all personal belongings.

## 2017-01-07 LAB
BACTERIA BLD CULT: NORMAL
BACTERIA BLD CULT: NORMAL
SIGNIFICANT IND 70042: NORMAL
SIGNIFICANT IND 70042: NORMAL
SITE SITE: NORMAL
SITE SITE: NORMAL
SOURCE SOURCE: NORMAL
SOURCE SOURCE: NORMAL

## 2017-05-02 ENCOUNTER — HOSPITAL ENCOUNTER (OUTPATIENT)
Dept: RADIOLOGY | Facility: MEDICAL CENTER | Age: 42
End: 2017-05-02
Attending: OBSTETRICS & GYNECOLOGY
Payer: COMMERCIAL

## 2017-05-02 DIAGNOSIS — Z30.09 SCREENING AND EVALUATION FOR FEMALE STERILIZATION: ICD-10-CM

## 2017-05-02 PROCEDURE — G0202 SCR MAMMO BI INCL CAD: HCPCS

## 2017-05-03 ENCOUNTER — HOSPITAL ENCOUNTER (OUTPATIENT)
Dept: RADIOLOGY | Facility: MEDICAL CENTER | Age: 42
End: 2017-05-03

## 2017-06-07 NOTE — ADDENDUM NOTE
Encounter addended by: Cheryl Bryson R.N. on: 6/6/2017  6:23 PM<BR>     Documentation filed: Inpatient Document Flowsheet

## 2017-08-09 DIAGNOSIS — Z01.812 PRE-OPERATIVE LABORATORY EXAMINATION: ICD-10-CM

## 2017-08-09 DIAGNOSIS — Z01.810 PRE-OPERATIVE CARDIOVASCULAR EXAMINATION: ICD-10-CM

## 2017-08-09 LAB
ANION GAP SERPL CALC-SCNC: 3 MMOL/L (ref 0–11.9)
APPEARANCE UR: CLEAR
BASOPHILS # BLD AUTO: 0.8 % (ref 0–1.8)
BASOPHILS # BLD: 0.07 K/UL (ref 0–0.12)
BILIRUB UR QL STRIP.AUTO: NEGATIVE
BUN SERPL-MCNC: 14 MG/DL (ref 8–22)
CALCIUM SERPL-MCNC: 9 MG/DL (ref 8.5–10.5)
CHLORIDE SERPL-SCNC: 107 MMOL/L (ref 96–112)
CO2 SERPL-SCNC: 27 MMOL/L (ref 20–33)
COLOR UR: YELLOW
CREAT SERPL-MCNC: 0.8 MG/DL (ref 0.5–1.4)
CULTURE IF INDICATED INDCX: NO UA CULTURE
EKG IMPRESSION: NORMAL
EOSINOPHIL # BLD AUTO: 0.17 K/UL (ref 0–0.51)
EOSINOPHIL NFR BLD: 2.1 % (ref 0–6.9)
ERYTHROCYTE [DISTWIDTH] IN BLOOD BY AUTOMATED COUNT: 46.8 FL (ref 35.9–50)
GFR SERPL CREATININE-BSD FRML MDRD: >60 ML/MIN/1.73 M 2
GLUCOSE SERPL-MCNC: 86 MG/DL (ref 65–99)
GLUCOSE UR STRIP.AUTO-MCNC: NEGATIVE MG/DL
HCG SERPL QL: NEGATIVE
HCT VFR BLD AUTO: 42.1 % (ref 37–47)
HGB BLD-MCNC: 14 G/DL (ref 12–16)
IMM GRANULOCYTES # BLD AUTO: 0.03 K/UL (ref 0–0.11)
IMM GRANULOCYTES NFR BLD AUTO: 0.4 % (ref 0–0.9)
KETONES UR STRIP.AUTO-MCNC: NEGATIVE MG/DL
LEUKOCYTE ESTERASE UR QL STRIP.AUTO: NEGATIVE
LYMPHOCYTES # BLD AUTO: 2.95 K/UL (ref 1–4.8)
LYMPHOCYTES NFR BLD: 35.8 % (ref 22–41)
MCH RBC QN AUTO: 28.6 PG (ref 27–33)
MCHC RBC AUTO-ENTMCNC: 33.3 G/DL (ref 33.6–35)
MCV RBC AUTO: 86.1 FL (ref 81.4–97.8)
MICRO URNS: NORMAL
MONOCYTES # BLD AUTO: 0.67 K/UL (ref 0–0.85)
MONOCYTES NFR BLD AUTO: 8.1 % (ref 0–13.4)
NEUTROPHILS # BLD AUTO: 4.35 K/UL (ref 2–7.15)
NEUTROPHILS NFR BLD: 52.8 % (ref 44–72)
NITRITE UR QL STRIP.AUTO: NEGATIVE
NRBC # BLD AUTO: 0 K/UL
NRBC BLD AUTO-RTO: 0 /100 WBC
PH UR STRIP.AUTO: 7.5 [PH]
PLATELET # BLD AUTO: 284 K/UL (ref 164–446)
PMV BLD AUTO: 10.9 FL (ref 9–12.9)
POTASSIUM SERPL-SCNC: 4.2 MMOL/L (ref 3.6–5.5)
PROT UR QL STRIP: NEGATIVE MG/DL
RBC # BLD AUTO: 4.89 M/UL (ref 4.2–5.4)
RBC UR QL AUTO: NEGATIVE
SODIUM SERPL-SCNC: 137 MMOL/L (ref 135–145)
SP GR UR STRIP.AUTO: 1.02
UROBILINOGEN UR STRIP.AUTO-MCNC: 0.2 MG/DL
WBC # BLD AUTO: 8.2 K/UL (ref 4.8–10.8)

## 2017-08-09 PROCEDURE — 80048 BASIC METABOLIC PNL TOTAL CA: CPT

## 2017-08-09 PROCEDURE — 93010 ELECTROCARDIOGRAM REPORT: CPT | Performed by: INTERNAL MEDICINE

## 2017-08-09 PROCEDURE — 36415 COLL VENOUS BLD VENIPUNCTURE: CPT

## 2017-08-09 PROCEDURE — 93005 ELECTROCARDIOGRAM TRACING: CPT

## 2017-08-09 PROCEDURE — 84703 CHORIONIC GONADOTROPIN ASSAY: CPT

## 2017-08-09 PROCEDURE — 81003 URINALYSIS AUTO W/O SCOPE: CPT

## 2017-08-09 PROCEDURE — 85025 COMPLETE CBC W/AUTO DIFF WBC: CPT

## 2017-08-22 NOTE — H&P
The patient is scheduled for surgery on .     CHIEF COMPLAINT:  Here for scheduled hysterectomy.    HISTORY OF PRESENT ILLNESS:  This is a 41-year-old para 1 female who has a   history of abnormal uterine bleeding who is admitted for definitive treatment   in the form of hysterectomy.  She has a long history of abnormal uterine   bleeding and has been tried on cyclic progesterone without relief.  An   ultrasound has been obtained which shows no visible abnormalities of uterus.    She does not want to try Mirena IUD or endometrial ablation and has requested   hysterectomy.    PAST MEDICAL HISTORY:  The patient is in good health.  No medical illnesses.    PAST SURGICAL HISTORY:  Include appendectomy,  section,   cholecystectomy and tonsillectomy.    ALLERGIES:  INCLUDE AUGMENTIN, LATEX SENSITIVITY AND NEOSPORIN.    FAMILY HISTORY:  Mother has history of blood clots.    SOCIAL HISTORY:  She is half pack per day smoker, single.  Denies drug use or   alcohol use.    REVIEW OF SYSTEMS:  She has had occasional shortness of breath.  She has   urgency and painful sex.  She denies chest pain, abdominal pain.    PHYSICAL EXAMINATION:  VITAL SIGNS:  Her height is 5 feet 3 inches.  Her weight is 224.  Her BMI is   39.  Her blood pressure is 120/80.  GENERAL APPEARANCE:  Patient is overweight, otherwise healthy appearing, in no   distress.  HEAD AND NECK:  Show sclerae are anicteric.  NECK:  Supple, no adenopathy.  LUNGS:  Clear to auscultation.  HEART:  Regular rate and rhythm.  ABDOMEN:  Soft and nontender.  GYNECOLOGIC:  Uterus is normal size and nontender.  Adnexa not enlarged,   tender.    IMPRESSION:  Abnormal uterine bleeding.    PLAN:  The patient wants to undergo hysterectomy for treatment of abnormal   bleeding.  She understands there is more conservative options of management,   but refuses to attempt trial of endometrial ablation and has already failed   medical management with progesterone.  She  has been counseled in detail   regarding the surgery, the risks, complications have been reviewed.  She   understands the possible inadvertent injury to other organs such as bowel,   bladder, ureter, blood vessels and nerves, understands other complications can   occur.  The plan will be to remove her uterus, cervix, and fallopian tubes   and leave ovaries in place, assuming they are normal.  _____.  She has agreed   for removal if necessary.       ____________________________________     MD AKBAR MAYER / WILBERT    DD:  08/21/2017 16:22:14  DT:  08/21/2017 17:35:52    D#:  [id number]  Job#:  [id number]

## 2017-08-22 NOTE — H&P
CHIEF COMPLAINT:  Here for scheduled hysterectomy.    HISTORY OF PRESENT ILLNESS:  This is a 41-year-old para 1 female who has a   history of abnormal uterine bleeding who is admitted for definitive treatment   in the form of hysterectomy.  She has a long history of abnormal uterine   bleeding and has been tried on cyclic progesterone without relief.  An   ultrasound has been obtained which shows no visible abnormalities of uterus.    She does not want to try Mirena IUD or endometrial ablation and has requested   hysterectomy.    PAST MEDICAL HISTORY:  The patient is in good health.  No medical illnesses.    PAST SURGICAL HISTORY:  Include appendectomy,  section,   cholecystectomy and tonsillectomy.    ALLERGIES:  INCLUDE AUGMENTIN, LATEX SENSITIVITY AND NEOSPORIN.    FAMILY HISTORY:  Mother has history of blood clots.    SOCIAL HISTORY:  She is half pack per day smoker, single.  Denies drug use or   alcohol use.    REVIEW OF SYSTEMS:  She has had occasional shortness of breath.  She has   urgency and painful sex.  She denies chest pain, abdominal pain.    PHYSICAL EXAMINATION:  VITAL SIGNS:  Her height is 5 feet 3 inches.  Her weight is 224.  Her BMI is   39.  Her blood pressure is 120/80.  GENERAL APPEARANCE:  Patient is overweight, otherwise healthy appearing, in no   distress.  HEAD AND NECK:  Show sclerae are anicteric.  NECK:  Supple, no adenopathy.  LUNGS:  Clear to auscultation.  HEART:  Regular rate and rhythm.  ABDOMEN:  Soft and nontender.  GYNECOLOGIC:  Uterus is normal size and nontender.  Adnexa not enlarged,   tender.    IMPRESSION:  Abnormal uterine bleeding.    PLAN:  The patient wants to undergo hysterectomy for treatment of abnormal   bleeding.  She understands there is more conservative options of management,   but refuses to attempt trial of endometrial ablation and has already failed   medical management with progesterone.  She has been counseled in detail   regarding the surgery, the  risks, complications have been reviewed.  She   understands the possible inadvertent injury to other organs such as bowel,   bladder, ureter, blood vessels and nerves, understands other complications can   occur.  The plan will be to remove her uterus, cervix, and fallopian tubes   and leave ovaries in place, assuming they are normal.  _____.  She has agreed   for removal if necessary.       ____________________________________     MD AKBAR MAYER / WILBERT    DD:  08/21/2017 16:22:14  DT:  08/21/2017 17:35:52    D#:  [id number]  Job#:  [id number]

## 2017-08-23 ENCOUNTER — HOSPITAL ENCOUNTER (OUTPATIENT)
Facility: MEDICAL CENTER | Age: 42
End: 2017-08-24
Attending: OBSTETRICS & GYNECOLOGY | Admitting: OBSTETRICS & GYNECOLOGY
Payer: COMMERCIAL

## 2017-08-23 PROBLEM — N93.9 ABNORMAL UTERINE BLEEDING: Status: ACTIVE | Noted: 2017-08-23

## 2017-08-23 LAB
HCG UR QL: NEGATIVE
SP GR UR REFRACTOMETRY: 1.02

## 2017-08-23 PROCEDURE — A9270 NON-COVERED ITEM OR SERVICE: HCPCS

## 2017-08-23 PROCEDURE — 88307 TISSUE EXAM BY PATHOLOGIST: CPT

## 2017-08-23 PROCEDURE — 502704 HCHG DEVICE, LIGASURE IMPACT: Performed by: OBSTETRICS & GYNECOLOGY

## 2017-08-23 PROCEDURE — 501572 HCHG TROCAR, SHIELD OBTU 5X100: Performed by: OBSTETRICS & GYNECOLOGY

## 2017-08-23 PROCEDURE — 700111 HCHG RX REV CODE 636 W/ 250 OVERRIDE (IP)

## 2017-08-23 PROCEDURE — 500886 HCHG PACK, LAPAROSCOPY: Performed by: OBSTETRICS & GYNECOLOGY

## 2017-08-23 PROCEDURE — 700104 HCHG RX REV CODE 254

## 2017-08-23 PROCEDURE — 160047 HCHG PACU  - EA ADDL 30 MINS PHASE II: Performed by: OBSTETRICS & GYNECOLOGY

## 2017-08-23 PROCEDURE — 501838 HCHG SUTURE GENERAL: Performed by: OBSTETRICS & GYNECOLOGY

## 2017-08-23 PROCEDURE — G0378 HOSPITAL OBSERVATION PER HR: HCPCS

## 2017-08-23 PROCEDURE — 160036 HCHG PACU - EA ADDL 30 MINS PHASE I: Performed by: OBSTETRICS & GYNECOLOGY

## 2017-08-23 PROCEDURE — 500868 HCHG NEEDLE, SURGI(VARES): Performed by: OBSTETRICS & GYNECOLOGY

## 2017-08-23 PROCEDURE — 160009 HCHG ANES TIME/MIN: Performed by: OBSTETRICS & GYNECOLOGY

## 2017-08-23 PROCEDURE — 160002 HCHG RECOVERY MINUTES (STAT): Performed by: OBSTETRICS & GYNECOLOGY

## 2017-08-23 PROCEDURE — 501330 HCHG SET, CYSTO IRRIG TUBING: Performed by: OBSTETRICS & GYNECOLOGY

## 2017-08-23 PROCEDURE — 160029 HCHG SURGERY MINUTES - 1ST 30 MINS LEVEL 4: Performed by: OBSTETRICS & GYNECOLOGY

## 2017-08-23 PROCEDURE — 500025 HCHG ARMREST, CRADLE FOAM: Performed by: OBSTETRICS & GYNECOLOGY

## 2017-08-23 PROCEDURE — 160046 HCHG PACU - 1ST 60 MINS PHASE II: Performed by: OBSTETRICS & GYNECOLOGY

## 2017-08-23 PROCEDURE — 160048 HCHG OR STATISTICAL LEVEL 1-5: Performed by: OBSTETRICS & GYNECOLOGY

## 2017-08-23 PROCEDURE — 700102 HCHG RX REV CODE 250 W/ 637 OVERRIDE(OP)

## 2017-08-23 PROCEDURE — 501411 HCHG SPONGE, BABY LAP W/O RINGS: Performed by: OBSTETRICS & GYNECOLOGY

## 2017-08-23 PROCEDURE — A4314 CATH W/DRAINAGE 2-WAY LATEX: HCPCS | Performed by: OBSTETRICS & GYNECOLOGY

## 2017-08-23 PROCEDURE — 160035 HCHG PACU - 1ST 60 MINS PHASE I: Performed by: OBSTETRICS & GYNECOLOGY

## 2017-08-23 PROCEDURE — A9270 NON-COVERED ITEM OR SERVICE: HCPCS | Performed by: OBSTETRICS & GYNECOLOGY

## 2017-08-23 PROCEDURE — 160041 HCHG SURGERY MINUTES - EA ADDL 1 MIN LEVEL 4: Performed by: OBSTETRICS & GYNECOLOGY

## 2017-08-23 PROCEDURE — 81025 URINE PREGNANCY TEST: CPT

## 2017-08-23 PROCEDURE — 700105 HCHG RX REV CODE 258: Performed by: OBSTETRICS & GYNECOLOGY

## 2017-08-23 PROCEDURE — 700101 HCHG RX REV CODE 250

## 2017-08-23 PROCEDURE — 501582 HCHG TROCAR, THRD BLADED: Performed by: OBSTETRICS & GYNECOLOGY

## 2017-08-23 PROCEDURE — 160025 RECOVERY II MINUTES (STATS): Performed by: OBSTETRICS & GYNECOLOGY

## 2017-08-23 PROCEDURE — 700102 HCHG RX REV CODE 250 W/ 637 OVERRIDE(OP): Performed by: OBSTETRICS & GYNECOLOGY

## 2017-08-23 RX ORDER — MAGNESIUM HYDROXIDE 1200 MG/15ML
LIQUID ORAL
Status: DISCONTINUED | OUTPATIENT
Start: 2017-08-23 | End: 2017-08-23 | Stop reason: HOSPADM

## 2017-08-23 RX ORDER — LIDOCAINE AND PRILOCAINE 25; 25 MG/G; MG/G
1 CREAM TOPICAL
Status: COMPLETED | OUTPATIENT
Start: 2017-08-23 | End: 2017-08-23

## 2017-08-23 RX ORDER — OXYCODONE HCL 5 MG/5 ML
SOLUTION, ORAL ORAL
Status: COMPLETED
Start: 2017-08-23 | End: 2017-08-23

## 2017-08-23 RX ORDER — KETOROLAC TROMETHAMINE 30 MG/ML
INJECTION, SOLUTION INTRAMUSCULAR; INTRAVENOUS
Status: COMPLETED
Start: 2017-08-23 | End: 2017-08-23

## 2017-08-23 RX ORDER — ONDANSETRON 2 MG/ML
4 INJECTION INTRAMUSCULAR; INTRAVENOUS EVERY 6 HOURS PRN
Status: DISCONTINUED | OUTPATIENT
Start: 2017-08-23 | End: 2017-08-24 | Stop reason: HOSPADM

## 2017-08-23 RX ORDER — LIDOCAINE HYDROCHLORIDE 10 MG/ML
INJECTION, SOLUTION INFILTRATION; PERINEURAL
Status: COMPLETED
Start: 2017-08-23 | End: 2017-08-23

## 2017-08-23 RX ORDER — SODIUM CHLORIDE, SODIUM LACTATE, POTASSIUM CHLORIDE, CALCIUM CHLORIDE 600; 310; 30; 20 MG/100ML; MG/100ML; MG/100ML; MG/100ML
INJECTION, SOLUTION INTRAVENOUS
Status: DISCONTINUED | OUTPATIENT
Start: 2017-08-23 | End: 2017-08-23 | Stop reason: HOSPADM

## 2017-08-23 RX ORDER — ACETAMINOPHEN 325 MG/1
650 TABLET ORAL ONCE
Status: ACTIVE | OUTPATIENT
Start: 2017-08-23 | End: 2017-08-24

## 2017-08-23 RX ORDER — OXYCODONE HYDROCHLORIDE 5 MG/1
5 TABLET ORAL
Qty: 28 TAB | Refills: 0 | Status: SHIPPED | OUTPATIENT
Start: 2017-08-23 | End: 2018-05-28

## 2017-08-23 RX ORDER — LORAZEPAM 2 MG/ML
INJECTION INTRAMUSCULAR
Status: COMPLETED
Start: 2017-08-23 | End: 2017-08-23

## 2017-08-23 RX ORDER — OXYCODONE HYDROCHLORIDE 5 MG/1
5 TABLET ORAL
Status: DISCONTINUED | OUTPATIENT
Start: 2017-08-23 | End: 2017-08-24 | Stop reason: HOSPADM

## 2017-08-23 RX ORDER — BUPIVACAINE HYDROCHLORIDE AND EPINEPHRINE 2.5; 5 MG/ML; UG/ML
INJECTION, SOLUTION EPIDURAL; INFILTRATION; INTRACAUDAL; PERINEURAL
Status: DISCONTINUED | OUTPATIENT
Start: 2017-08-23 | End: 2017-08-23 | Stop reason: HOSPADM

## 2017-08-23 RX ORDER — LIDOCAINE HYDROCHLORIDE 10 MG/ML
0.5 INJECTION, SOLUTION INFILTRATION; PERINEURAL
Status: COMPLETED | OUTPATIENT
Start: 2017-08-23 | End: 2017-08-23

## 2017-08-23 RX ORDER — IBUPROFEN 800 MG/1
800 TABLET ORAL
Status: DISCONTINUED | OUTPATIENT
Start: 2017-08-23 | End: 2017-08-24 | Stop reason: HOSPADM

## 2017-08-23 RX ORDER — SODIUM CHLORIDE, SODIUM LACTATE, POTASSIUM CHLORIDE, CALCIUM CHLORIDE 600; 310; 30; 20 MG/100ML; MG/100ML; MG/100ML; MG/100ML
INJECTION, SOLUTION INTRAVENOUS CONTINUOUS
Status: DISCONTINUED | OUTPATIENT
Start: 2017-08-23 | End: 2017-08-24 | Stop reason: HOSPADM

## 2017-08-23 RX ORDER — IBUPROFEN 800 MG/1
800 TABLET ORAL
Qty: 30 TAB | Refills: 1 | Status: SHIPPED | OUTPATIENT
Start: 2017-08-23 | End: 2018-05-28

## 2017-08-23 RX ADMIN — SODIUM CHLORIDE, POTASSIUM CHLORIDE, SODIUM LACTATE AND CALCIUM CHLORIDE: 600; 310; 30; 20 INJECTION, SOLUTION INTRAVENOUS at 19:00

## 2017-08-23 RX ADMIN — SODIUM CHLORIDE, SODIUM LACTATE, POTASSIUM CHLORIDE, CALCIUM CHLORIDE: 600; 310; 30; 20 INJECTION, SOLUTION INTRAVENOUS at 06:37

## 2017-08-23 RX ADMIN — EPHEDRINE SULFATE 0.5 MG: 50 INJECTION INTRAMUSCULAR; INTRAVENOUS; SUBCUTANEOUS at 10:30

## 2017-08-23 RX ADMIN — LORAZEPAM 0.5 MG: 2 INJECTION INTRAMUSCULAR; INTRAVENOUS at 10:30

## 2017-08-23 RX ADMIN — LORAZEPAM 0.5 MG: 2 INJECTION INTRAMUSCULAR; INTRAVENOUS at 10:50

## 2017-08-23 RX ADMIN — EPHEDRINE SULFATE 5 MG: 50 INJECTION INTRAMUSCULAR; INTRAVENOUS; SUBCUTANEOUS at 10:37

## 2017-08-23 RX ADMIN — FENTANYL CITRATE 50 MCG: 50 INJECTION, SOLUTION INTRAMUSCULAR; INTRAVENOUS at 10:15

## 2017-08-23 RX ADMIN — EPHEDRINE SULFATE 5 MG: 50 INJECTION INTRAMUSCULAR; INTRAVENOUS; SUBCUTANEOUS at 11:00

## 2017-08-23 RX ADMIN — LIDOCAINE HYDROCHLORIDE 0.5 ML: 10 INJECTION, SOLUTION INFILTRATION; PERINEURAL at 06:37

## 2017-08-23 RX ADMIN — OXYCODONE HYDROCHLORIDE 5 MG: 5 TABLET ORAL at 22:59

## 2017-08-23 RX ADMIN — KETOROLAC TROMETHAMINE 30 MG: 30 INJECTION, SOLUTION INTRAMUSCULAR at 10:00

## 2017-08-23 RX ADMIN — IBUPROFEN 800 MG: 800 TABLET, FILM COATED ORAL at 18:59

## 2017-08-23 RX ADMIN — OXYCODONE HYDROCHLORIDE 10 MG: 5 SOLUTION ORAL at 10:00

## 2017-08-23 RX ADMIN — EPHEDRINE SULFATE 10 MG: 50 INJECTION INTRAMUSCULAR; INTRAVENOUS; SUBCUTANEOUS at 11:15

## 2017-08-23 ASSESSMENT — PAIN SCALES - GENERAL
PAINLEVEL_OUTOF10: 2
PAINLEVEL_OUTOF10: 8
PAINLEVEL_OUTOF10: 3
PAINLEVEL_OUTOF10: 1
PAINLEVEL_OUTOF10: 4
PAINLEVEL_OUTOF10: 4
PAINLEVEL_OUTOF10: ASSUMED PAIN PRESENT

## 2017-08-23 ASSESSMENT — LIFESTYLE VARIABLES
ALCOHOL_USE: NO
EVER_SMOKED: YES

## 2017-08-23 ASSESSMENT — PAIN SCALES - WONG BAKER
WONGBAKER_NUMERICALRESPONSE: HURTS A LITTLE MORE
WONGBAKER_NUMERICALRESPONSE: HURTS A LITTLE MORE

## 2017-08-23 ASSESSMENT — PATIENT HEALTH QUESTIONNAIRE - PHQ9
2. FEELING DOWN, DEPRESSED, IRRITABLE, OR HOPELESS: NOT AT ALL
1. LITTLE INTEREST OR PLEASURE IN DOING THINGS: NOT AT ALL
SUM OF ALL RESPONSES TO PHQ9 QUESTIONS 1 AND 2: 0
SUM OF ALL RESPONSES TO PHQ QUESTIONS 1-9: 0

## 2017-08-23 NOTE — IP AVS SNAPSHOT
8/24/2017    Bria Martinez  [address]  [place] [address]    Dear Bria:    [facility] wants to ensure your discharge home is safe and you or your loved ones have had all of your questions answered regarding your care after you leave the hospital.    Below is a list of resources and contact information should you have any questions regarding your hospital stay, follow-up instructions, or active medical symptoms.    Questions or Concerns Regarding… Contact   Medical Questions Related to Your Discharge  (7 days a week, 8am-5pm) Contact a Nurse Care Coordinator   [phone]   Medical Questions Not Related to Your Discharge  (24 hours a day / 7 days a week)  Contact the Nurse Health Line   [phone]    Medications or Discharge Instructions Refer to your discharge packet   or contact your [facility] Primary Care Provider   [phone]   Follow-up Appointment(s) Schedule your appointment via [employer]   or contact Scheduling [phone]   Billing Review your statement via [employer]  or contact Billing [phone]   Medical Records Review your records via [employer]   or contact Medical Records [phone]     You may receive a telephone call within two days of discharge. This call is to make certain you understand your discharge instructions and have the opportunity to have any questions answered. You can also easily access your medical information, test results and upcoming appointments via the [employer] free online health management tool. You can learn more and sign up at [email or web address]. For assistance setting up your [employer] account, please call [phone].    Once again, we want to ensure your discharge home is safe and that you have a clear understanding of any next steps in your care. If you have any questions or concerns, please do not hesitate to contact us, we are here for you. Thank you for choosing [facility] for your healthcare needs.    Sincerely,    Your [facility] Healthcare Team

## 2017-08-23 NOTE — OR NURSING
At 1415, BP 72/43 ( pre op BP 91/50), pt denies dizziness, lightheadedness.  Head of bed down, fluid infusing.  Cont to monitor.    At 1430, BP 84/56, pt dozing but easily arrousable.  Denies dizziness, lightheadedness.

## 2017-08-23 NOTE — OR NURSING
Pt able to void easily, 300ml green/blue urine.  Ambulates with steady gait back to room. Will place call to Dr. Liriano, per his orders, to update on pt status.

## 2017-08-23 NOTE — IP AVS SNAPSHOT
" Home Care Instructions                                                                                                                  Name:Bria Martinez  Medical Record Number:[id number]  CSN: [id number]    YOB: 1975   Age: 41 y.o.  Sex: female  HT:1.6 m (5' 3\") WT: 100.7 kg (222 lb 0.1 oz)          Admit Date: 8/23/2017     Discharge Date:   Today's Date: 8/24/2017  Attending Doctor:  Davin Liriano M.D.                  Allergies:  Bee; Augmentin; Latex; Neosporin lip health; Other misc; and Tape            Discharge Instructions       Discharge Instructions    Discharged to home by car with relative. Discharged via walking, hospital escort: Refused.  Special equipment needed: Not Applicable    Be sure to schedule a follow-up appointment with your primary care doctor or any specialists as instructed.     Discharge Plan:   Diet Plan: Discussed  Activity Level: Discussed  Smoking Cessation Offered: Patient Refused  Confirmed Follow up Appointment: Patient to Call and Schedule Appointment  Confirmed Symptoms Management: Discussed  Medication Reconciliation Updated: Yes  Influenza Vaccine Indication: Indicated: Not available from distributor/    I understand that a diet low in cholesterol, fat, and sodium is recommended for good health. Unless I have been given specific instructions below for another diet, I accept this instruction as my diet prescription.   Other diet: follow your regular diet as tolerated    Special Instructions: None    · Is patient discharged on Warfarin / Coumadin?   No     · Is patient Post Blood Transfusion?  No    Depression / Suicide Risk    As you are discharged from this [facility] facility, it is important to learn how to keep safe from harming yourself.    Recognize the warning signs:  · Abrupt changes in personality, positive or negative- including increase in energy   · Giving away possessions  · Change in eating patterns- significant weight changes-  " positive or negative  · Change in sleeping patterns- unable to sleep or sleeping all the time   · Unwillingness or inability to communicate  · Depression  · Unusual sadness, discouragement and loneliness  · Talk of wanting to die  · Neglect of personal appearance   · Rebelliousness- reckless behavior  · Withdrawal from people/activities they love  · Confusion- inability to concentrate     If you or a loved one observes any of these behaviors or has concerns about self-harm, here's what you can do:  · Talk about it- your feelings and reasons for harming yourself  · Remove any means that you might use to hurt yourself (examples: pills, rope, extension cords, firearm)  · Get professional help from the community (Mental Health, Substance Abuse, psychological counseling)  · Do not be alone:Call your Safe Contact- someone whom you trust who will be there for you.  · Call your local CRISIS HOTLINE 297-1450 or 053-863-6758  · Call your local Children's Mobile Crisis Response Team [place] (428) 544-2975 or www.[employer]  · Call the toll free National Suicide Prevention Hotlines   · National Suicide Prevention Lifeline 009-160-ZFAA (6285)  · National Hope Line Network 800-SUICIDE (618-9995)      OK to remove dressings, then may shower.  No soaking incisions until after follow up appointment in 2 weeks    Laparoscopically Assisted Vaginal Hysterectomy, Care After  Refer to this sheet in the next few weeks. These instructions provide you with information on caring for yourself after your procedure. Your health care provider may also give you more specific instructions. Your treatment has been planned according to current medical practices, but problems sometimes occur. Call your health care provider if you have any problems or questions after your procedure.  WHAT TO EXPECT AFTER THE PROCEDURE  After your procedure, it is typical to have the following:  · Abdominal pain. You will be given pain medicine to control  it.  · Sore throat from the breathing tube that was inserted during surgery.  HOME CARE INSTRUCTIONS  · Only take over-the-counter or prescription medicines for pain, discomfort, or fever as directed by your health care provider.  · Do not take aspirin. It can cause bleeding.  · Do not drive when taking pain medicine.  · Follow your health care provider's advice regarding diet, exercise, lifting, driving, and general activities.  · Resume your usual diet as directed and allowed.  · Get plenty of rest and sleep.  · Do not douche, use tampons, or have sexual intercourse for at least 6 weeks, or until your health care provider gives you permission.  · Change your bandages (dressings) as directed by your health care provider.  · Monitor your temperature and notify your health care provider of a fever.  · Take showers instead of baths for 2-3 weeks.  · Do not drink alcohol until your health care provider gives you permission.  · If you develop constipation, you may take a mild laxative with your health care provider's permission. Bran foods may help with constipation problems. Drinking enough fluids to keep your urine clear or pale yellow may help as well.  · Try to have someone home with you for 1-2 weeks to help around the house.  · Keep all of your follow-up appointments as directed by your health care provider.  SEEK MEDICAL CARE IF:   · You have swelling, redness, or increasing pain around your incision sites.  · You have pus coming from your incision.  · You notice a bad smell coming from your incision.  · Your incision breaks open.  · You feel dizzy or lightheaded.  · You have pain or bleeding when you urinate.  · You have persistent diarrhea.  · You have persistent nausea and vomiting.  · You have abnormal vaginal discharge.  · You have a rash.  · You have any type of abnormal reaction or develop an allergy to your medicine.  · You have poor pain control with your prescribed medicine.  SEEK IMMEDIATE MEDICAL CARE  IF:   · You have a fever.  · You have severe abdominal pain.  · You have chest pain.  · You have shortness of breath.  · You faint.  · You have pain, swelling, or redness in your leg.  · You have heavy vaginal bleeding with blood clots.  MAKE SURE YOU:  · Understand these instructions.  · Will watch your condition.  · Will get help right away if you are not doing well or get worse.     This information is not intended to replace advice given to you by your health care provider. Make sure you discuss any questions you have with your health care provider.     ACTIVITY: Rest and take it easy for the first 24 hours.  A responsible adult is recommended to remain with you during that time.  It is normal to feel sleepy.  We encourage you to not do anything that requires balance, judgment or coordination.    MILD FLU-LIKE SYMPTOMS ARE NORMAL. YOU MAY EXPERIENCE GENERALIZED MUSCLE ACHES, THROAT IRRITATION, HEADACHE AND/OR SOME NAUSEA.    FOR 24 HOURS DO NOT:  Drive, operate machinery or run household appliances.  Drink beer or alcoholic beverages.   Make important decisions or sign legal documents.    SPECIAL INSTRUCTIONS:     DIET: To avoid nausea, slowly advance diet as tolerated, avoiding spicy or greasy foods for the first day.  Add more substantial food to your diet according to your physician's instructions.  Babies can be fed formula or breast milk as soon as they are hungry.  INCREASE FLUIDS AND FIBER TO AVOID CONSTIPATION.    SURGICAL DRESSING/BATHING: MAY REMOVE DRESSING IN 48HRS. MAY SHOWER AFTER DRESSING REMOVED. NO SEX, DOUCHING, BATH, HOT TUB, UNTIL AFTER FOLLOW UP APPOINTMENT WITH DR. RAMIREZ    FOLLOW-UP APPOINTMENT:  A follow-up appointment should be arranged with your doctor in 2 WEEKS; call to schedule.    You should CALL YOUR PHYSICIAN if you develop:  Fever greater than 101 degrees F.  Pain not relieved by medication, or persistent nausea or vomiting.  Excessive bleeding (blood soaking through dressing)  or unexpected drainage from the wound.  Extreme redness or swelling around the incision site, drainage of pus or foul smelling drainage.  Inability to urinate or empty your bladder within 8 hours.  Problems with breathing or chest pain.    You should call 911 if you develop problems with breathing or chest pain.  If you are unable to contact your doctor or surgical center, you should go to the nearest emergency room or urgent care center.  Physician's telephone #: DR RAMIREZ [phone]    If any questions arise, call your doctor.  If your doctor is not available, please feel free to call the Surgical Center at [phone].  The Center is open Monday through Friday from 7AM to 7PM.  You can also call the HEALTH HOTLINE open 24 hours/day, 7 days/week and speak to a nurse at [phone], or toll free at [phone].    A registered nurse may call you a few days after your surgery to see how you are doing after your procedure.    MEDICATIONS: Resume taking daily medication.  Take prescribed pain medication with food.  If no medication is prescribed, you may take non-aspirin pain medication if needed.  PAIN MEDICATION CAN BE VERY CONSTIPATING.  Take a stool softener or laxative such as senokot, pericolace, or milk of magnesia if needed.    Prescription given for *Roxicodone and Ibuprofen*.  Last pain medication given at 10:00    If your physician has prescribed pain medication that includes Acetaminophen (Tylenol), do not take additional Acetaminophen (Tylenol) while taking the prescribed medication.    Depression / Suicide Risk    As you are discharged from this [facility] facility, it is important to learn how to keep safe from harming yourself.    Recognize the warning signs:  · Abrupt changes in personality, positive or negative- including increase in energy   · Giving away possessions  · Change in eating patterns- significant weight changes-  positive or negative  · Change in sleeping patterns- unable to  sleep or sleeping all the time   · Unwillingness or inability to communicate  · Depression  · Unusual sadness, discouragement and loneliness  · Talk of wanting to die  · Neglect of personal appearance   · Rebelliousness- reckless behavior  · Withdrawal from people/activities they love  · Confusion- inability to concentrate     If you or a loved one observes any of these behaviors or has concerns about self-harm, here's what you can do:  · Talk about it- your feelings and reasons for harming yourself  · Remove any means that you might use to hurt yourself (examples: pills, rope, extension cords, firearm)  · Get professional help from the community (Mental Health, Substance Abuse, psychological counseling)  · Do not be alone:Call your Safe Contact- someone whom you trust who will be there for you.  · Call your local CRISIS HOTLINE 486-4033 or 285-641-9709  · Call your local Children's Mobile Crisis Response Team [place] (140) 584-8752 or www.Spring.me  · Call the toll free National Suicide Prevention Hotlines   · National Suicide Prevention Lifeline 683-220-STQI (6249)  · National Hope Line Network 800-SUICIDE (753-9935)    Pain Medicine Instructions  HOW CAN PAIN MEDICINE AFFECT ME?  You were given a prescription for pain medicine. This medicine may make you tired or drowsy and may affect your ability to think clearly. Pain medicine may also affect your ability to drive or perform certain physical activities. It may not be possible to make all of your pain go away, but you should be comfortable enough to move, breathe, and take care of yourself.  HOW OFTEN SHOULD I TAKE PAIN MEDICINE AND HOW MUCH SHOULD I TAKE?  · Take pain medicine only as directed by your health care provider and only as needed for pain.  · You do not need to take pain medicine if you are not having pain, unless directed by your health care provider.  · You can take less than the prescribed dose if you find that a smaller amount of  medicine controls your pain.  WHAT RESTRICTIONS DO I HAVE WHILE TAKING PAIN MEDICINE?  Follow these instructions after you start taking pain medicine, while you are taking the medicine, and for 8 hours after you stop taking the medicine:  · Do not drive.  · Do not operate machinery.  · Do not operate power tools.  · Do not sign legal documents.  · Do not drink alcohol.  · Do not take sleeping pills.  · Do not supervise children by yourself.  · Do not participate in activities that require climbing or being in high places.  · Do not enter a body of water--such as a lake, river, ocean, spa, or swimming pool--without an adult nearby who can monitor and help you.  HOW CAN I KEEP OTHERS SAFE WHILE I AM TAKING PAIN MEDICINE?  · Store your pain medicine as directed by your health care provider. Make sure that it is placed where children and pets cannot reach it.  · Never share your pain medicine with anyone.  · Do not save any leftover pills. If you have any leftover pain medicine, get rid of it or destroy it as directed by your health care provider.  WHAT ELSE DO I NEED TO KNOW ABOUT TAKING PAIN MEDICINE?  · Use a stool softener if you become constipated from your pain medicine. Increasing your intake of fruits and vegetables will also help with constipation.  · Write down the times when you take your pain medicine. Look at the times before you take your next dose of medicine. It is easy to become confused while on pain medicine. Recording the times helps you to avoid an overdose.  · If your pain is severe, do not try to treat it yourself by taking more pills than instructed on your prescription. Contact your health care provider for help.  · You may have been prescribed a pain medicine that contains acetaminophen. Do not take any other acetaminophen while taking this medicine. An overdose of acetaminophen can result in severe liver damage. Acetaminophen is found in many over-the-counter (OTC) and prescription medicines.  If you are taking any medicines in addition to your pain medicine, check the active ingredients on those medicines to see if acetaminophen is listed.  WHEN SHOULD I CALL MY HEALTH CARE PROVIDER?  · Your medicine is not helping to make the pain go away.  · You vomit or have diarrhea shortly after taking the medicine.  · You develop new pain in areas that did not hurt before.  · You have an allergic reaction to your medicine. This may include:  ¨ Itchiness.  ¨ Swelling.  ¨ Dizziness.  ¨ Developing a new rash.  WHEN SHOULD I CALL 911 OR GO TO THE EMERGENCY ROOM?  · You feel dizzy or you faint.  · You are very confused or disoriented.  · You repeatedly vomit.  · Your skin or lips turn pale or bluish in color.  · You have shortness of breath or you are breathing much more slowly than usual.  · You have a severe allergic reaction to your medicine. This includes:  ¨ Developing tongue swelling.  ¨ Having difficulty breathing.     This information is not intended to replace advice given to you by your health care provider. Make sure you discuss any questions you have with your health care provider.     Document Released: 03/26/2002 Document Revised: 05/03/2016 Document Reviewed: 10/22/2015  [employer] Interactive Patient Education ©2016 [employer]  Incision Care  An incision is when a surgeon cuts into your body. After surgery, the incision needs to be cared for properly to prevent infection.   HOW TO CARE FOR YOUR INCISION  · Take medicines only as directed by your health care provider.  · There are many different ways to close and cover an incision, including stitches, skin glue, and adhesive strips. Follow your health care provider's instructions on:  ¨ Incision care.  ¨ Bandage (dressing) changes and removal.  ¨ Incision closure removal.  · Do not take baths, swim, or use a hot tub until your health care provider approves. You may shower as directed by your health care provider.  · Resume your normal diet and activities  as directed.  · Use anti-itch medicine (such as an antihistamine) as directed by your health care provider. The incision may itch while it is healing. Do not pick or scratch at the incision.  · Drink enough fluid to keep your urine clear or pale yellow.  SEEK MEDICAL CARE IF:   · You have drainage, redness, swelling, or pain at your incision site.  · You have muscle aches, chills, or a general ill feeling.  · You notice a bad smell coming from the incision or dressing.  · Your incision edges separate after the sutures, staples, or skin adhesive strips have been removed.  · You have persistent nausea or vomiting.  · You have a fever.  · You are dizzy.  SEEK IMMEDIATE MEDICAL CARE IF:   · You have a rash.  · You faint.  · You have difficulty breathing.  MAKE SURE YOU:   · Understand these instructions.  · Will watch your condition.  · Will get help right away if you are not doing well or get worse.     This information is not intended to replace advice given to you by your health care provider. Make sure you discuss any questions you have with your health care provider.     Document Released: 07/07/2006 Document Revised: 01/08/2016 Document Reviewed: 02/11/2015  [employer] Interactive Patient Education ©2016 [employer]      Follow-up Information     1. Follow up with Davin Liriano M.D.. Go in 2 weeks.    Specialty:  OB/Gyn    Contact information    [id number]  [address]  [place] [address]  [phone]           Discharge Medication Instructions:    Below are the medications your physician expects you to take upon discharge:    Review all your home medications and newly ordered medications with your doctor and/or pharmacist. Follow medication instructions as directed by your doctor and/or pharmacist.    Please keep your medication list with you and share with your physician.               Medication List      START taking these medications        Instructions    Morning Afternoon Evening Bedtime    oxycodone  immediate-release 5 MG Tabs   Last time this was given:  5 mg on 8/23/2017 10:59 PM   Commonly known as:  ROXICODONE   Next Dose Due:  Available now if needed.         Take 1 Tab by mouth every 3 hours as needed (Severe Pain (NRS Pain Scale 7-10; CPOT Pain Scale 6-8)).   Dose:  5 mg                          CHANGE how you take these medications        Instructions    Morning Afternoon Evening Bedtime    ibuprofen 800 MG Tabs   What changed:    - medication strength  - how much to take  - when to take this  - reasons to take this   Last time this was given:  800 mg on 8/24/2017  6:44 AM   Commonly known as:  MOTRIN   Next Dose Due:  At lunch time with your food. Then at dinner time with your food.         Take 1 Tab by mouth 3 times a day, with meals.   Dose:  800 mg                          CONTINUE taking these medications        Instructions    Morning Afternoon Evening Bedtime    furosemide 20 MG Tabs   Commonly known as:  LASIX        Take 20 mg by mouth as needed. Indications: Edema   Dose:  20 mg                        potassium chloride SA 20 MEQ Tbcr   Commonly known as:  Kdur        Take 0.5 Tabs by mouth every day.   Dose:  10 mEq                             Where to Get Your Medications      You can get these medications from any pharmacy     Bring a paper prescription for each of these medications    - ibuprofen 800 MG Tabs  - oxycodone immediate-release 5 MG Tabs            Instructions           Diet / Nutrition:    Follow any diet instructions given to you by your doctor or the dietician, including how much salt (sodium) you are allowed each day.    If you are overweight, talk to your doctor about a weight reduction plan.    Activity:    Remain physically active following your doctor's instructions about exercise and activity.    Rest often.     Any time you become even a little tired or short of breath, SIT DOWN and rest.    Worsening Symptoms:    Report any of the following signs and symptoms to the  doctor's office immediately:    *Pain of jaw, arm, or neck  *Chest pain not relieved by medication                               *Dizziness or loss of consciousness  *Difficulty breathing even when at rest   *More tired than usual                                       *Bleeding drainage or swelling of surgical site  *Swelling of feet, ankles, legs or stomach                 *Fever (>100ºF)  *Pink or blood tinged sputum  *Weight gain (3lbs/day or 5lbs /week)           *Shock from internal defibrillator (if applicable)  *Palpitations or irregular heartbeats                *Cool and/or numb extremities    Stroke Awareness    Common Risk Factors for Stroke include:    Age  Atrial Fibrillation  Carotid Artery Stenosis  Diabetes Mellitus  Excessive alcohol consumption  High blood pressure  Overweight   Physical inactivity  Smoking    Warning signs and symptoms of a stroke include:    *Sudden numbness or weakness of the face, arm or leg (especially on one side of the body).  *Sudden confusion, trouble speaking or understanding.  *Sudden trouble seeing in one or both eyes.  *Sudden trouble walking, dizziness, loss of balance or coordination.Sudden severe headache with no known cause.    It is very important to get treatment quickly when a stroke occurs. If you experience any of the above warning signs, call 911 immediately.                   Disclaimer         Quit Smoking / Tobacco Use:    I understand the use of any tobacco products increases my chance of suffering from future heart disease or stroke and could cause other illnesses which may shorten my life. Quitting the use of tobacco products is the single most important thing I can do to improve my health. For further information on smoking / tobacco cessation call a Toll Free Quit Line at 1-229.883.5542 (*[employer] [place]) or 1-719.674.4214 (American Lung Association) or you can access the web based program at www.lungusa.org.    [place] Tobacco Users Help  Line:  (223) 621-1463       Toll Free: 3-629-661-3147  Quit Tobacco Program [facility] (119)645-5366    Crisis Hotline:    [place] Crisis Hotline:  7-361-BGRAPFF or 1-340.652.3542    [place] Crisis Hotline:    1-330.237.5883 or 651-249-0271    Discharge Survey:   Thank you for choosing [facility]. We hope we did everything we could to make your hospital stay a pleasant one. You may be receiving a phone survey and we would appreciate your time and participation in answering the questions. Your input is very valuable to us in our efforts to improve our service to our patients and their families.        My signature on this form indicates that:    1. I have reviewed and understand the above information.  2. My questions regarding this information have been answered to my satisfaction.  3. I have formulated a plan with my discharge nurse to obtain my prescribed medications for home.                  Disclaimer         __________________________________                     __________       ________                       Patient Signature                                                 Date                    Time

## 2017-08-23 NOTE — OR NURSING
Notified Dr Liriano of pt's slow progress toward discharge with low O2 sat, still dependent on 2L and hypotensive. Orders received.

## 2017-08-23 NOTE — IP AVS SNAPSHOT
" <p align=\"LEFT\"><IMG SRC=\"//[email or web address]$/[email or web address]\" alt=\"Image\" WIDTH=\"50%\" HEIGHT=\"200\" BORDER=\"\"></p>                   Name:Bria Martinez  Medical Record Number:[id number]  CSN: [id number]    YOB: 1975   Age: 41 y.o.  Sex: female  HT:1.6 m (5' 3\") WT: 100.7 kg (222 lb 0.1 oz)          Admit Date: 8/23/2017     Discharge Date:   Today's Date: 8/24/2017  Attending Doctor:  Davin Liriano M.D.                  Allergies:  Bee; Augmentin; Latex; Neosporin lip health; Other misc; and Tape          Follow-up Information     1. Follow up with Davin Liriano M.D.. Go in 2 weeks.    Specialty:  OB/Gyn    Contact information    [address]  [address]  [facility] [address]  [phone]           Medication List      Take these Medications        Instructions    furosemide 20 MG Tabs   Commonly known as:  LASIX    Take 20 mg by mouth as needed. Indications: Edema   Dose:  20 mg       ibuprofen 800 MG Tabs   What changed:    - medication strength  - how much to take  - when to take this  - reasons to take this   Commonly known as:  MOTRIN    Take 1 Tab by mouth 3 times a day, with meals.   Dose:  800 mg       oxycodone immediate-release 5 MG Tabs   Commonly known as:  ROXICODONE    Take 1 Tab by mouth every 3 hours as needed (Severe Pain (NRS Pain Scale 7-10; CPOT Pain Scale 6-8)).   Dose:  5 mg       potassium chloride SA 20 MEQ Tbcr   Commonly known as:  Kdur    Take 0.5 Tabs by mouth every day.   Dose:  10 mEq         "

## 2017-08-23 NOTE — OR NURSING
New orders from Dr. Liriano to discharge patient, f/u in 2 weeks or sooner if needed, discontinue previously written lab orders for labs tomorrow AM, avoid tub baths until follow up appt.

## 2017-08-23 NOTE — OR NURSING
Report to MUKESH Muñoz.  Pt's SBP 77-87.  BP cuff changed.  Pt denies lightheadedness, dizziness.  Titrated oxygen to 1 L via NC.  Abdomen soft, stab sites cd&i.  Pt remains sleepy but easily arrousable.

## 2017-08-23 NOTE — IP AVS SNAPSHOT
[employer] Access Code: [email or web address]  Expires: 8/29/2017  4:01 AM    Your email address is not on file at [facility].  Email Addresses are required for you to sign up for [employer], please contact [phone] to verify your personal information and to provide your email address prior to attempting to register for [employer].    Bria Martinez  [address] [place] [address]    [employer]  A secure, online tool to manage your health information     [facility]’s [employer]® is a secure, online tool that connects you to your personalized health information from the privacy of your home -- day or night - making it very easy for you to manage your healthcare. Once the activation process is completed, you can even access your medical information using the [employer] alessandro, which is available for free in the Apple Alessandro store or Google Play store.     To learn more about [employer], visit [email or web address]    There are two levels of access available (as shown below):   My Chart Features  [facility] Primary Care Doctor [facility]  Specialists [facility]  Urgent  Care Non-[facility] Primary Care Doctor   Email your healthcare team securely and privately 24/7 X X X    Manage appointments: schedule your next appointment; view details of past/upcoming appointments X      Request prescription refills. X      View recent personal medical records, including lab and immunizations X X X X   View health record, including health history, allergies, medications X X X X   Read reports about your outpatient visits, procedures, consult and ER notes X X X X   See your discharge summary, which is a recap of your hospital and/or ER visit that includes your diagnosis, lab results, and care plan X X  X     How to register for [employer]:  Once your e-mail address has been verified, follow the following steps to sign up for [employer].     1. Go to  [email or web address]  2. Click on the Sign Up Now box, which takes you to the New Member Sign Up page. You  will need to provide the following information:  a. Enter your [employer] Access Code exactly as it appears at the top of this page. (You will not need to use this code after you’ve completed the sign-up process. If you do not sign up before the expiration date, you must request a new code.)   b. Enter your date of birth.   c. Enter your home email address.   d. Click Submit, and follow the next screen’s instructions.  3. Create a [employer] ID. This will be your [employer] login ID and cannot be changed, so think of one that is secure and easy to remember.  4. Create a [employer] password. You can change your password at any time.  5. Enter your Password Reset Question and Answer. This can be used at a later time if you forget your password.   6. Enter your e-mail address. This allows you to receive e-mail notifications when new information is available in [employer].  7. Click Sign Up. You can now view your health information.    For assistance activating your [employer] account, call [phone]

## 2017-08-23 NOTE — OP REPORT
DATE OF SERVICE:  2017    PREOPERATIVE DIAGNOSIS:  Abnormal uterine bleeding.    POSTOPERATIVE DIAGNOSIS:  Abnormal uterine bleeding.    OPERATION:  Laparoscopic assisted total vaginal hysterectomy with bilateral   salpingectomy.    SURGEON:  Davin Liriano MD    ASSISTANT:  Fadi Orosco MD    ANESTHESIA:  General.    ANESTHESIOLOGIST:  Froilan Dxion MD    ESTIMATED BLOOD LOSS:  150 mL    OPERATIVE FINDINGS:  There were adhesions from the previous  section   from the uterus to the bladder, which was not unexpected.  Uterus, tubes, and   ovaries appeared free of any visible pathology.    DESCRIPTION OF PROCEDURE:  The patient was taken the operating room, was   placed under general anesthesia in lithotomy position.  She was sterilely   prepped and draped in normal fashion.  A Hulka tenaculum was placed on the   anterior lip of the cervix.  Open laparoscopy was performed by dissecting the   abdominal layers down under direct visualization and placement of the trocar   into the peritoneal cavity.  The operative laparoscope was then placed and the   LigaSure device was used as a vessel sealing device throughout the surgery.    A separate 5 mm port was placed suprapubically for a grasping tool.  The   fallopian tube on the right side was identified and placed on traction.  The   mesosalpinx cauterized and transected down to the uteroovarian pedicle and   round ligament.  These pedicles were also transected and secured with the   cautery.  The dissection was carried out along the broad ligament down to the   level of the uterine artery, which was also cauterized and transected with   good hemostasis.  The anterior peritoneal reflection was dissected with blunt   and sharp dissection.  A similar procedure was carried out on the right   adnexa.  There was minimal blood loss with this portion of the surgery.    Instruments were removed and carbon dioxide was released.    The patient was repositioned on the table for  performance of the vaginal   portion of the surgery.  She had good descent of the cervix to the level of   the introitus.  The cervix was injected with 10 mL of 0.25% Marcaine with   epinephrine and circumscribed with the scalpel.  Posterior cul-de-sac was   entered successfully and sharply.  The bladder was dissected away from the   anterior cervical segment with blunt and sharp dissection.  Pedicles were then   progressively obtained along the uterosacral ligaments, cardinal ligaments,   and the rest of the uterine artery bundles, each pedicle secured with 0   Vicryl.  The uterus was removed intact.  All pedicles appeared hemostatic.    The vaginal cuff was then closed with running locking 0 Vicryl suture.    The laparoscope was placed back in the peritoneal cavity and the pelvis was   irrigated until clear.  There was no evidence of active bleeding.  The   instruments were removed and the carbon dioxide was released.  The incisions   were closed with 2-0 Vicryl in the fascia and 4-0 Vicryl on the skin.    Cystoscopy was then performed demonstrating bilateral ureteral patency and   Dc catheter was placed back into the bladder.  At the end of the operation,   all counts were correct.  There were no complications noted.  She was taken   to recovery in stable condition.  Plans were made to consider for discharge   later this afternoon, if not in the morning.       ____________________________________     MD AKBAR MAYER / WILBERT    DD:  08/23/2017 09:20:59  DT:  08/23/2017 10:20:40    D#:  [id number]  Job#:  [id number]

## 2017-08-23 NOTE — OR NURSING
Pt using IS effectively, pulling to 1750 without difficulty.  Pt instructed on how to splint abdomen.  RA O2 sats @ 97% with IS use.

## 2017-08-24 VITALS
WEIGHT: 222 LBS | HEART RATE: 79 BPM | SYSTOLIC BLOOD PRESSURE: 97 MMHG | HEIGHT: 63 IN | TEMPERATURE: 98.2 F | BODY MASS INDEX: 39.34 KG/M2 | OXYGEN SATURATION: 96 % | RESPIRATION RATE: 17 BRPM | DIASTOLIC BLOOD PRESSURE: 67 MMHG

## 2017-08-24 PROCEDURE — 700102 HCHG RX REV CODE 250 W/ 637 OVERRIDE(OP): Performed by: OBSTETRICS & GYNECOLOGY

## 2017-08-24 PROCEDURE — G0378 HOSPITAL OBSERVATION PER HR: HCPCS

## 2017-08-24 PROCEDURE — A9270 NON-COVERED ITEM OR SERVICE: HCPCS | Performed by: OBSTETRICS & GYNECOLOGY

## 2017-08-24 RX ADMIN — IBUPROFEN 800 MG: 800 TABLET, FILM COATED ORAL at 06:44

## 2017-08-24 ASSESSMENT — PAIN SCALES - GENERAL
PAINLEVEL_OUTOF10: 7
PAINLEVEL_OUTOF10: 5

## 2017-08-24 NOTE — DISCHARGE INSTRUCTIONS
Discharge Instructions    Discharged to home by car with relative. Discharged via walking, hospital escort: Refused.  Special equipment needed: Not Applicable    Be sure to schedule a follow-up appointment with your primary care doctor or any specialists as instructed.     Discharge Plan:   Diet Plan: Discussed  Activity Level: Discussed  Smoking Cessation Offered: Patient Refused  Confirmed Follow up Appointment: Patient to Call and Schedule Appointment  Confirmed Symptoms Management: Discussed  Medication Reconciliation Updated: Yes  Influenza Vaccine Indication: Indicated: Not available from distributor/    I understand that a diet low in cholesterol, fat, and sodium is recommended for good health. Unless I have been given specific instructions below for another diet, I accept this instruction as my diet prescription.   Other diet: follow your regular diet as tolerated    Special Instructions: None    · Is patient discharged on Warfarin / Coumadin?   No     · Is patient Post Blood Transfusion?  No    Depression / Suicide Risk    As you are discharged from this [facility], it is important to learn how to keep safe from harming yourself.    Recognize the warning signs:  · Abrupt changes in personality, positive or negative- including increase in energy   · Giving away possessions  · Change in eating patterns- significant weight changes-  positive or negative  · Change in sleeping patterns- unable to sleep or sleeping all the time   · Unwillingness or inability to communicate  · Depression  · Unusual sadness, discouragement and loneliness  · Talk of wanting to die  · Neglect of personal appearance   · Rebelliousness- reckless behavior  · Withdrawal from people/activities they love  · Confusion- inability to concentrate     If you or a loved one observes any of these behaviors or has concerns about self-harm, here's what you can do:  · Talk about it- your feelings and reasons for harming  yourself  · Remove any means that you might use to hurt yourself (examples: pills, rope, extension cords, firearm)  · Get professional help from the community (Mental Health, Substance Abuse, psychological counseling)  · Do not be alone:Call your Safe Contact- someone whom you trust who will be there for you.  · Call your local CRISIS HOTLINE 630-4198 or 904-185-7932  · Call your local Children's Mobile Crisis Response Team [place] (544) 569-8831 or wwwSprainGo  · Call the toll free National Suicide Prevention Hotlines   · National Suicide Prevention Lifeline 426-502-MCEY (6588)  · [place] Line Network 800-SUICIDE (608-3820)      OK to remove dressings, then may shower.  No soaking incisions until after follow up appointment in 2 weeks    Laparoscopically Assisted Vaginal Hysterectomy, Care After  Refer to this sheet in the next few weeks. These instructions provide you with information on caring for yourself after your procedure. Your health care provider may also give you more specific instructions. Your treatment has been planned according to current medical practices, but problems sometimes occur. Call your health care provider if you have any problems or questions after your procedure.  WHAT TO EXPECT AFTER THE PROCEDURE  After your procedure, it is typical to have the following:  · Abdominal pain. You will be given pain medicine to control it.  · Sore throat from the breathing tube that was inserted during surgery.  HOME CARE INSTRUCTIONS  · Only take over-the-counter or prescription medicines for pain, discomfort, or fever as directed by your health care provider.  · Do not take aspirin. It can cause bleeding.  · Do not drive when taking pain medicine.  · Follow your health care provider's advice regarding diet, exercise, lifting, driving, and general activities.  · Resume your usual diet as directed and allowed.  · Get plenty of rest and sleep.  · Do not douche, use tampons, or have  sexual intercourse for at least 6 weeks, or until your health care provider gives you permission.  · Change your bandages (dressings) as directed by your health care provider.  · Monitor your temperature and notify your health care provider of a fever.  · Take showers instead of baths for 2-3 weeks.  · Do not drink alcohol until your health care provider gives you permission.  · If you develop constipation, you may take a mild laxative with your health care provider's permission. Bran foods may help with constipation problems. Drinking enough fluids to keep your urine clear or pale yellow may help as well.  · Try to have someone home with you for 1-2 weeks to help around the house.  · Keep all of your follow-up appointments as directed by your health care provider.  SEEK MEDICAL CARE IF:   · You have swelling, redness, or increasing pain around your incision sites.  · You have pus coming from your incision.  · You notice a bad smell coming from your incision.  · Your incision breaks open.  · You feel dizzy or lightheaded.  · You have pain or bleeding when you urinate.  · You have persistent diarrhea.  · You have persistent nausea and vomiting.  · You have abnormal vaginal discharge.  · You have a rash.  · You have any type of abnormal reaction or develop an allergy to your medicine.  · You have poor pain control with your prescribed medicine.  SEEK IMMEDIATE MEDICAL CARE IF:   · You have a fever.  · You have severe abdominal pain.  · You have chest pain.  · You have shortness of breath.  · You faint.  · You have pain, swelling, or redness in your leg.  · You have heavy vaginal bleeding with blood clots.  MAKE SURE YOU:  · Understand these instructions.  · Will watch your condition.  · Will get help right away if you are not doing well or get worse.     This information is not intended to replace advice given to you by your health care provider. Make sure you discuss any questions you have with your health care  provider.     ACTIVITY: Rest and take it easy for the first 24 hours.  A responsible adult is recommended to remain with you during that time.  It is normal to feel sleepy.  We encourage you to not do anything that requires balance, judgment or coordination.    MILD FLU-LIKE SYMPTOMS ARE NORMAL. YOU MAY EXPERIENCE GENERALIZED MUSCLE ACHES, THROAT IRRITATION, HEADACHE AND/OR SOME NAUSEA.    FOR 24 HOURS DO NOT:  Drive, operate machinery or run household appliances.  Drink beer or alcoholic beverages.   Make important decisions or sign legal documents.    SPECIAL INSTRUCTIONS:     DIET: To avoid nausea, slowly advance diet as tolerated, avoiding spicy or greasy foods for the first day.  Add more substantial food to your diet according to your physician's instructions.  Babies can be fed formula or breast milk as soon as they are hungry.  INCREASE FLUIDS AND FIBER TO AVOID CONSTIPATION.    SURGICAL DRESSING/BATHING: MAY REMOVE DRESSING IN 48HRS. MAY SHOWER AFTER DRESSING REMOVED. NO SEX, DOUCHING, BATH, HOT TUB, UNTIL AFTER FOLLOW UP APPOINTMENT WITH DR. RAMIREZ    FOLLOW-UP APPOINTMENT:  A follow-up appointment should be arranged with your doctor in 2 WEEKS; call to schedule.    You should CALL YOUR PHYSICIAN if you develop:  Fever greater than 101 degrees F.  Pain not relieved by medication, or persistent nausea or vomiting.  Excessive bleeding (blood soaking through dressing) or unexpected drainage from the wound.  Extreme redness or swelling around the incision site, drainage of pus or foul smelling drainage.  Inability to urinate or empty your bladder within 8 hours.  Problems with breathing or chest pain.    You should call 911 if you develop problems with breathing or chest pain.  If you are unable to contact your doctor or surgical center, you should go to the nearest emergency room or urgent care center.  Physician's telephone #: DR RAMIREZ [phone]    If any questions arise, call your doctor.  If your  doctor is not available, please feel free to call the Surgical Center at [phone].  The Center is open Monday through Friday from 7AM to 7PM.  You can also call the HEALTH HOTLINE open 24 hours/day, 7 days/week and speak to a nurse at [phone], or toll free at [phone].    A registered nurse may call you a few days after your surgery to see how you are doing after your procedure.    MEDICATIONS: Resume taking daily medication.  Take prescribed pain medication with food.  If no medication is prescribed, you may take non-aspirin pain medication if needed.  PAIN MEDICATION CAN BE VERY CONSTIPATING.  Take a stool softener or laxative such as senokot, pericolace, or milk of magnesia if needed.    Prescription given for *Roxicodone and Ibuprofen*.  Last pain medication given at 10:00    If your physician has prescribed pain medication that includes Acetaminophen (Tylenol), do not take additional Acetaminophen (Tylenol) while taking the prescribed medication.    Depression / Suicide Risk    As you are discharged from this [facility] facility, it is important to learn how to keep safe from harming yourself.    Recognize the warning signs:  · Abrupt changes in personality, positive or negative- including increase in energy   · Giving away possessions  · Change in eating patterns- significant weight changes-  positive or negative  · Change in sleeping patterns- unable to sleep or sleeping all the time   · Unwillingness or inability to communicate  · Depression  · Unusual sadness, discouragement and loneliness  · Talk of wanting to die  · Neglect of personal appearance   · Rebelliousness- reckless behavior  · Withdrawal from people/activities they love  · Confusion- inability to concentrate     If you or a loved one observes any of these behaviors or has concerns about self-harm, here's what you can do:  · Talk about it- your feelings and reasons for harming yourself  · Remove any means that you might use  to hurt yourself (examples: pills, rope, extension cords, firearm)  · Get professional help from the community (Mental Health, Substance Abuse, psychological counseling)  · Do not be alone:Call your Safe Contact- someone whom you trust who will be there for you.  · Call your local CRISIS HOTLINE 581-9979 or 527-043-5278  · Call your local Children's Mobile Crisis Response Team [place] (122) 752-0035 or www.MightyHive  · Call the toll free National Suicide Prevention Hotlines   · National Suicide Prevention Lifeline 521-107-RBNR (5728)  · National Hope Line Network 800-SUICIDE (855-7288)    Pain Medicine Instructions  HOW CAN PAIN MEDICINE AFFECT ME?  You were given a prescription for pain medicine. This medicine may make you tired or drowsy and may affect your ability to think clearly. Pain medicine may also affect your ability to drive or perform certain physical activities. It may not be possible to make all of your pain go away, but you should be comfortable enough to move, breathe, and take care of yourself.  HOW OFTEN SHOULD I TAKE PAIN MEDICINE AND HOW MUCH SHOULD I TAKE?  · Take pain medicine only as directed by your health care provider and only as needed for pain.  · You do not need to take pain medicine if you are not having pain, unless directed by your health care provider.  · You can take less than the prescribed dose if you find that a smaller amount of medicine controls your pain.  WHAT RESTRICTIONS DO I HAVE WHILE TAKING PAIN MEDICINE?  Follow these instructions after you start taking pain medicine, while you are taking the medicine, and for 8 hours after you stop taking the medicine:  · Do not drive.  · Do not operate machinery.  · Do not operate power tools.  · Do not sign legal documents.  · Do not drink alcohol.  · Do not take sleeping pills.  · Do not supervise children by yourself.  · Do not participate in activities that require climbing or being in high places.  · Do not enter a  body of water--such as a lake, river, ocean, spa, or swimming pool--without an adult nearby who can monitor and help you.  HOW CAN I KEEP OTHERS SAFE WHILE I AM TAKING PAIN MEDICINE?  · Store your pain medicine as directed by your health care provider. Make sure that it is placed where children and pets cannot reach it.  · Never share your pain medicine with anyone.  · Do not save any leftover pills. If you have any leftover pain medicine, get rid of it or destroy it as directed by your health care provider.  WHAT ELSE DO I NEED TO KNOW ABOUT TAKING PAIN MEDICINE?  · Use a stool softener if you become constipated from your pain medicine. Increasing your intake of fruits and vegetables will also help with constipation.  · Write down the times when you take your pain medicine. Look at the times before you take your next dose of medicine. It is easy to become confused while on pain medicine. Recording the times helps you to avoid an overdose.  · If your pain is severe, do not try to treat it yourself by taking more pills than instructed on your prescription. Contact your health care provider for help.  · You may have been prescribed a pain medicine that contains acetaminophen. Do not take any other acetaminophen while taking this medicine. An overdose of acetaminophen can result in severe liver damage. Acetaminophen is found in many over-the-counter (OTC) and prescription medicines. If you are taking any medicines in addition to your pain medicine, check the active ingredients on those medicines to see if acetaminophen is listed.  WHEN SHOULD I CALL MY HEALTH CARE PROVIDER?  · Your medicine is not helping to make the pain go away.  · You vomit or have diarrhea shortly after taking the medicine.  · You develop new pain in areas that did not hurt before.  · You have an allergic reaction to your medicine. This may include:  ¨ Itchiness.  ¨ Swelling.  ¨ Dizziness.  ¨ Developing a new rash.  WHEN SHOULD I CALL 911 OR GO TO  THE EMERGENCY ROOM?  · You feel dizzy or you faint.  · You are very confused or disoriented.  · You repeatedly vomit.  · Your skin or lips turn pale or bluish in color.  · You have shortness of breath or you are breathing much more slowly than usual.  · You have a severe allergic reaction to your medicine. This includes:  ¨ Developing tongue swelling.  ¨ Having difficulty breathing.     This information is not intended to replace advice given to you by your health care provider. Make sure you discuss any questions you have with your health care provider.     Document Released: 03/26/2002 Document Revised: 05/03/2016 Document Reviewed: 10/22/2015  [employer] Interactive Patient Education ©2016 [employer]  Incision Care  An incision is when a surgeon cuts into your body. After surgery, the incision needs to be cared for properly to prevent infection.   HOW TO CARE FOR YOUR INCISION  · Take medicines only as directed by your health care provider.  · There are many different ways to close and cover an incision, including stitches, skin glue, and adhesive strips. Follow your health care provider's instructions on:  ¨ Incision care.  ¨ Bandage (dressing) changes and removal.  ¨ Incision closure removal.  · Do not take baths, swim, or use a hot tub until your health care provider approves. You may shower as directed by your health care provider.  · Resume your normal diet and activities as directed.  · Use anti-itch medicine (such as an antihistamine) as directed by your health care provider. The incision may itch while it is healing. Do not pick or scratch at the incision.  · Drink enough fluid to keep your urine clear or pale yellow.  SEEK MEDICAL CARE IF:   · You have drainage, redness, swelling, or pain at your incision site.  · You have muscle aches, chills, or a general ill feeling.  · You notice a bad smell coming from the incision or dressing.  · Your incision edges separate after the sutures, staples, or skin  adhesive strips have been removed.  · You have persistent nausea or vomiting.  · You have a fever.  · You are dizzy.  SEEK IMMEDIATE MEDICAL CARE IF:   · You have a rash.  · You faint.  · You have difficulty breathing.  MAKE SURE YOU:   · Understand these instructions.  · Will watch your condition.  · Will get help right away if you are not doing well or get worse.     This information is not intended to replace advice given to you by your health care provider. Make sure you discuss any questions you have with your health care provider.     Document Released: 07/07/2006 Document Revised: 01/08/2016 Document Reviewed: 02/11/2015  [employer] Interactive Patient Education ©2016 [employer]

## 2017-08-24 NOTE — PROGRESS NOTES
Bed alarm  refused, pt is A&Ox4, education given regarding the need and patient continues to refuse.

## 2017-08-24 NOTE — PROGRESS NOTES
Report received from RN, assumed Care. AOx4, responds appropriately.    Denies pain needs at this time, states pain is controlled with po scheduled meds.  IV SL.   Tolerating diet.   Up to void, no difficulty  +flatus  Has ride home.   Signed prescriptions with patient.   Follow up in 2 weeks.     Plan of care discussed, all questions answered.   Up self with a steady gait.    Call light and belongings within reach, treaded slipper socks on, SCD in use, bed in lowest locked position.  Hourly rounding in place, Will monitor frequently.

## 2017-08-24 NOTE — PROGRESS NOTES
Patient discharged home, wheeled out, boyfriend drove home.   PIV removed. Signed prescriptions with patient.   Discharge education provided.  All personal belongings collected.   Wheeled down by CNA.

## 2017-08-24 NOTE — CARE PLAN
Problem: Discharge Barriers/Planning  Goal: Patient’s continuum of care needs will be met  Outcome: MET Date Met:  08/24/17  Discharging home today, education provided.

## 2017-08-24 NOTE — PROGRESS NOTES
Report received from MUKESH Muñoz, awaiting arrival of patient to unit.   Wanda states she will not transfer patient during shift change.

## 2017-08-24 NOTE — PROGRESS NOTES
Assumed care of patient from day RN. A&Ox 4. Stand-by assist. 2 L oxygen, sating at 98%. Moves all extremities, no weakness, pain with movement. Denies numbness or tingling. Voiding, hypoactive BS, + flatus, LBM PTA. Regular diet, tolerating well, denies nausea/ vomiting. Wound(s): abdominal lap-stab x2. Patient reports 2 out of 10 pain, previously medicated per MAR. Pt is wearing treaded slipper socks, IV pole is on the same side as the bathroom, lower bedrails are down. Pt calls appropriatly. Discussed POC, all questions answered at this time. Bed is locked and in the lowest position, call light within reach, Q 2 hour rounding in place, will continue to monitor.

## 2017-08-24 NOTE — PROGRESS NOTES
POD #1    Patient was admitted overnight because after surgery yesterday she was having low BP and her O2 sat was dropping on room air. She is this am it is 97%  She is having minimal pain, passing gas , ambulating and voiding and would like to go home    Afebrile  abd soft and not distended or tender, dressings dry  Gyn no bleeding  Calves not tender    Stable post op, ready for home    Discharge home today, RTO 2 week  Instructions reviewed and questions answered

## 2018-01-27 ENCOUNTER — OFFICE VISIT (OUTPATIENT)
Dept: URGENT CARE | Facility: PHYSICIAN GROUP | Age: 43
End: 2018-01-27
Payer: COMMERCIAL

## 2018-01-27 VITALS
OXYGEN SATURATION: 97 % | SYSTOLIC BLOOD PRESSURE: 100 MMHG | HEART RATE: 88 BPM | BODY MASS INDEX: 40.22 KG/M2 | HEIGHT: 63 IN | WEIGHT: 227 LBS | TEMPERATURE: 98.1 F | RESPIRATION RATE: 16 BRPM | DIASTOLIC BLOOD PRESSURE: 76 MMHG

## 2018-01-27 DIAGNOSIS — Z23 NEED FOR TDAP VACCINATION: ICD-10-CM
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
## 2018-01-27 DIAGNOSIS — Z87.892 HISTORY OF ANAPHYLAXIS: ICD-10-CM

## 2018-01-27 PROCEDURE — 99203 OFFICE O/P NEW LOW 30 MIN: CPT | Mod: 25 | Performed by: NURSE PRACTITIONER

## 2018-01-27 PROCEDURE — 90471 IMMUNIZATION ADMIN: CPT | Performed by: NURSE PRACTITIONER

## 2018-01-27 PROCEDURE — 90715 TDAP VACCINE 7 YRS/> IM: CPT | Performed by: NURSE PRACTITIONER

## 2018-01-27 RX ORDER — CLINDAMYCIN HYDROCHLORIDE 300 MG/1
300 CAPSULE ORAL 4 TIMES DAILY
Qty: 28 CAP | Refills: 0 | Status: SHIPPED | OUTPATIENT
Start: 2018-01-27 | End: 2018-02-03

## 2018-01-27 RX ORDER — SULFAMETHOXAZOLE AND TRIMETHOPRIM 800; 160 MG/1; MG/1
1 TABLET ORAL 2 TIMES DAILY
Qty: 10 TAB | Refills: 0 | Status: SHIPPED | OUTPATIENT
Start: 2018-01-27 | End: 2018-02-06

## 2018-01-27 RX ORDER — EPINEPHRINE 0.3 MG/.3ML
0.3 INJECTION SUBCUTANEOUS ONCE
Qty: 0.3 ML | Refills: 0 | Status: SHIPPED | OUTPATIENT
Start: 2018-01-27 | End: 2018-01-27

## 2018-01-27 ASSESSMENT — ENCOUNTER SYMPTOMS
SHORTNESS OF BREATH: 0
WHEEZING: 0
TINGLING: 0
NAUSEA: 0

## 2018-01-27 ASSESSMENT — LIFESTYLE VARIABLES: SUBSTANCE_ABUSE: 1

## 2018-01-27 NOTE — PATIENT INSTRUCTIONS
Animal Bite  An animal bite can result in a scratch on the skin, deep open cut, puncture of the skin, crush injury, or tearing away of the skin or a body part. Dogs are responsible for most animal bites. Children are bitten more often than adults. An animal bite can range from very mild to more serious. A small bite from your house pet is no cause for alarm. However, some animal bites can become infected or injure a bone or other tissue. You must seek medical care if:  · The skin is broken and bleeding does not slow down or stop after 15 minutes.  · The puncture is deep and difficult to clean (such as a cat bite).  · Pain, warmth, redness, or pus develops around the wound.  · The bite is from a stray animal or rodent. There may be a risk of rabies infection.  · The bite is from a snake, raccoon, skunk, mcdonald, coyote, or bat. There may be a risk of rabies infection.  · The person bitten has a chronic illness such as diabetes, liver disease, or cancer, or the person takes medicine that lowers the immune system.  · There is concern about the location and severity of the bite.  It is important to clean and protect an animal bite wound right away to prevent infection. Follow these steps:  · Clean the wound with plenty of water and soap.  · Apply an antibiotic cream.  · Apply gentle pressure over the wound with a clean towel or gauze to slow or stop bleeding.  · Elevate the affected area above the heart to help stop any bleeding.  · Seek medical care. Getting medical care within 8 hours of the animal bite leads to the best possible outcome.  DIAGNOSIS   Your caregiver will most likely:  · Take a detailed history of the animal and the bite injury.  · Perform a wound exam.  · Take your medical history.  Blood tests or X-rays may be performed. Sometimes, infected bite wounds are cultured and sent to a lab to identify the infectious bacteria.   TREATMENT   Medical treatment will depend on the location and type of animal bite as  well as the patient's medical history. Treatment may include:  · Wound care, such as cleaning and flushing the wound with saline solution, bandaging, and elevating the affected area.  · Antibiotics.  · Tetanus immunization.  · Rabies immunization.  · Leaving the wound open to heal. This is often done with animal bites, due to the high risk of infection. However, in certain cases, wound closure with stitches, wound adhesive, skin adhesive strips, or staples may be used.   Infected bites that are left untreated may require intravenous (IV) antibiotics and surgical treatment in the hospital.  HOME CARE INSTRUCTIONS  · Follow your caregiver's instructions for wound care.  · Take all medicines as directed.  · If your caregiver prescribes antibiotics, take them as directed. Finish them even if you start to feel better.  · Follow up with your caregiver for further exams or immunizations as directed.  You may need a tetanus shot if:  · You cannot remember when you had your last tetanus shot.  · You have never had a tetanus shot.  · The injury broke your skin.  If you get a tetanus shot, your arm may swell, get red, and feel warm to the touch. This is common and not a problem. If you need a tetanus shot and you choose not to have one, there is a rare chance of getting tetanus. Sickness from tetanus can be serious.  SEEK MEDICAL CARE IF:  · You notice warmth, redness, soreness, swelling, pus discharge, or a bad smell coming from the wound.  · You have a red line on the skin coming from the wound.  · You have a fever, chills, or a general ill feeling.  · You have nausea or vomiting.  · You have continued or worsening pain.  · You have trouble moving the injured part.  · You have other questions or concerns.  MAKE SURE YOU:  · Understand these instructions.  · Will watch your condition.  · Will get help right away if you are not doing well or get worse.     This information is not intended to replace advice given to you by your  health care provider. Make sure you discuss any questions you have with your health care provider.     Document Released: 09/04/2012 Document Revised: 03/11/2013 Document Reviewed: 09/04/2012  [employer] Interactive Patient Education ©2016 [employer] Inc.

## 2018-01-27 NOTE — PROGRESS NOTES
"Subjective:      Bria Martinez is a 42 y.o. female who presents with Cat Bite (C/o cat bite on palm of LT hand, feels itchy, soreness occured approx 1 hour.  PT has a history of severe allergic reactions, not perticularly to cats.  Current epipen is , needs new one)  PFSH reviewed and updated as necessary in EPIC electronic record with patient today.  Medications including OTC medications reviewed with patient.         Allergies   Allergen Reactions   • Bee Anaphylaxis   • Augmentin Rash     Pt states \"I get a bad rash\".     • Latex Rash     Pt states \"I get a bad rash\".   • Neosporin Lip Health Hives     Blisters and swelling at site   • Other Misc Rash     Nail acrylic- Pt states \"I get a bad rash\".   • Tape Rash     Pt states \"I get a bad rash\".  Paper tape ok         Tdap:      HPI this is a new problem. She was at the [place] office with her cat today when the cat bit her left hand area she has 4 puncture marks. Her that washed her wounds aggressively with surgical soap. She was told to come immediately to urgent care for evaluation and treatment. Complaints of pain in her left hand at the bite amina. Pain 7 out of 10. No other aggravating or bleeding factors. Her cat is fully vaccinated    She is also here today requesting a medication refill of her epinephrine pens which have . 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 She has her  EpiPen with her in her purse. She has never had the use this.    Review of Systems   Respiratory: Negative for shortness of breath and wheezing.    Cardiovascular: Negative for chest pain.   Gastrointestinal: Negative for nausea.   Neurological: Negative for tingling.   Psychiatric/Behavioral: Positive for substance abuse (tobacco).          Objective:     /76   Pulse 88   Temp 36.7 °C (98.1 °F)   Resp 16   Ht 1.6 m (5' 3\")   Wt 103 kg (227 lb)   SpO2 97%   BMI 40.21 kg/m²      Physical Exam   Constitutional: She is oriented to person, " place, and time. She appears well-developed and well-nourished.   Cardiovascular: Normal rate.    Pulmonary/Chest: Effort normal.   Musculoskeletal:        Hands:  Neurological: She is alert and oriented to person, place, and time.   Skin: Skin is warm. Capillary refill takes less than 2 seconds.   Psychiatric: She has a normal mood and affect. Her speech is normal and behavior is normal.   Nursing note and vitals reviewed.              Assessment/Plan:     1. Cat bite, initial encounter    - clindamycin (CLEOCIN) 300 MG Cap; Take 1 Cap by mouth 4 times a day for 7 days.  Dispense: 28 Cap; Refill: 0  - sulfamethoxazole-trimethoprim (BACTRIM DS) 800-160 MG tablet; Take 1 Tab by mouth 2 times a day for 10 days.  Dispense: 10 Tab; Refill: 0    2. Need for Tdap vaccination    - TDAP VACCINE =>8YO IM    3. History of anaphylaxis    - EPINEPHrine (EPIPEN 2-MARLYN) 0.3 MG/0.3ML Solution Auto-injector solution for injection; 0.3 mL by Intramuscular route Once for 1 dose.  Dispense: 0.3 mL; Refill: 0    Educated in proper administration of medication(s) ordered today including safety, possible SE, risks, benefits, rationale and alternatives to therapy.   Warms compresses/ soaks to affected area 2-3 times a day for 15 min.   Do not use lotions- creams or over-the-counter antibiotic ointments to treat the bites. They've them open to air. Okay to cover with a Band-Aid if draining.   The patient is alerted to watch for any signs of infection (redness, pus, pain, increased swelling or fever) and call if such occurs. Home wound care instructions are provided. Tetanus vaccination status reviewed: Td vaccination indicated and given today.

## 2018-02-13 ENCOUNTER — HOSPITAL ENCOUNTER (EMERGENCY)
Facility: MEDICAL CENTER | Age: 43
End: 2018-02-13
Attending: EMERGENCY MEDICINE
Payer: COMMERCIAL

## 2018-02-13 VITALS
BODY MASS INDEX: 40.39 KG/M2 | TEMPERATURE: 98.6 F | RESPIRATION RATE: 20 BRPM | HEART RATE: 98 BPM | OXYGEN SATURATION: 95 % | WEIGHT: 228 LBS | DIASTOLIC BLOOD PRESSURE: 63 MMHG | SYSTOLIC BLOOD PRESSURE: 100 MMHG

## 2018-02-13 DIAGNOSIS — T78.40XA ALLERGIC REACTION, INITIAL ENCOUNTER: ICD-10-CM

## 2018-02-13 PROCEDURE — 99284 EMERGENCY DEPT VISIT MOD MDM: CPT

## 2018-02-13 PROCEDURE — 96360 HYDRATION IV INFUSION INIT: CPT

## 2018-02-13 PROCEDURE — 700105 HCHG RX REV CODE 258: Performed by: EMERGENCY MEDICINE

## 2018-02-13 RX ORDER — SODIUM CHLORIDE 9 MG/ML
1000 INJECTION, SOLUTION INTRAVENOUS ONCE
Status: COMPLETED | OUTPATIENT
Start: 2018-02-13 | End: 2018-02-13

## 2018-02-13 RX ORDER — EPINEPHRINE 0.3 MG/.3ML
0.3 INJECTION SUBCUTANEOUS ONCE
Qty: 0.3 ML | Refills: 1 | Status: SHIPPED | OUTPATIENT
Start: 2018-02-13 | End: 2018-02-13

## 2018-02-13 RX ADMIN — SODIUM CHLORIDE 1000 ML: 9 INJECTION, SOLUTION INTRAVENOUS at 18:45

## 2018-02-13 NOTE — LETTER
February 13, 2018    To Whom It May Concern:         This is confirmation that Bria Martinez was seen in the emergency department on 2/13/18.    May return to work 2/15/18.         If you have any questions please do not hesitate to call.    Sincerely,          Susanna Salazar R.N.

## 2018-02-14 NOTE — DISCHARGE INSTRUCTIONS
Allergies  An allergy is an abnormal reaction to a substance by the body's defense system (immune system). Allergies can develop at any age.  WHAT CAUSES ALLERGIES?  An allergic reaction happens when the immune system mistakenly reacts to a normally harmless substance, called an allergen, as if it were harmful. The immune system releases antibodies to fight the substance. Antibodies eventually release a chemical called histamine into the bloodstream. The release of histamine is meant to protect the body from infection, but it also causes discomfort.  An allergic reaction can be triggered by:  · Eating an allergen.  · Inhaling an allergen.  · Touching an allergen.  WHAT TYPES OF ALLERGIES ARE THERE?  There are many types of allergies. Common types include:  · Seasonal allergies. People with this type of allergy are usually allergic to substances that are only present during certain seasons, such as molds and pollens.  · Food allergies.  · Drug allergies.  · Insect allergies.  · Animal dander allergies.  WHAT ARE SYMPTOMS OF ALLERGIES?  Possible allergy symptoms include:  · Swelling of the lips, face, tongue, mouth, or throat.  · Sneezing, coughing, or wheezing.  · Nasal congestion.  · Tingling in the mouth.  · Rash.  · Itching.  · Itchy, red, swollen areas of skin (hives).  · Watery eyes.  · Vomiting.  · Diarrhea.  · Dizziness.  · Lightheadedness.  · Fainting.  · Trouble breathing or swallowing.  · Chest tightness.  · Rapid heartbeat.  HOW ARE ALLERGIES DIAGNOSED?  Allergies are diagnosed with a medical and family history and one or more of the following:  · Skin tests.  · Blood tests.  · A food diary. A food diary is a record of all the foods and drinks you have in a day and of all the symptoms you experience.  · The results of an elimination diet. An elimination diet involves eliminating foods from your diet and then adding them back in one by one to find out if a certain food causes an allergic reaction.  HOW ARE  ALLERGIES TREATED?  There is no cure for allergies, but allergic reactions can be treated with medicine. Severe reactions usually need to be treated at a hospital.  HOW CAN REACTIONS BE PREVENTED?  The best way to prevent an allergic reaction is by avoiding the substance you are allergic to. Allergy shots and medicines can also help prevent reactions in some cases. People with severe allergic reactions may be able to prevent a life-threatening reaction called anaphylaxis with a medicine given right after exposure to the allergen.     This information is not intended to replace advice given to you by your health care provider. Make sure you discuss any questions you have with your health care provider.     Document Released: 03/12/2004 Document Revised: 01/08/2016 Document Reviewed: 09/29/2015  [employer] Interactive Patient Education ©2016 [employer]    Epinephrine Injection  Epinephrine is a medicine given by injection to temporarily treat an emergency allergic reaction. It is also used to treat severe asthmatic attacks and other lung problems. The medicine helps to enlarge (dilate) the small breathing tubes of the lungs. A life-threatening, sudden allergic reaction that involves the whole body is called anaphylaxis. Because of potential side effects, epinephrine should only be used as directed by your caregiver.  RISKS AND COMPLICATIONS  Possible side effects of epinephrine injections include:  · Chest pain.  · Irregular or rapid heartbeat.  · Shortness of breath.  · Nausea.  · Vomiting.  · Abdominal pain or cramping.  · Sweating.  · Dizziness.  · Weakness.  · Headache.  · Nervousness.  Report all side effects to your caregiver.  HOW TO GIVE AN EPINEPHRINE INJECTION  Give the epinephrine injection immediately when symptoms of a severe reaction begin. Inject the medicine into the outer thigh or any available, large muscle. Your caregiver can teach you how to do this. You do not need to remove any clothing. After  the injection, call your local emergency services (911 in [place] Even if you improve after the injection, you need to be examined at a hospital emergency department. Epinephrine works quickly, but it also wears off quickly. Delayed reactions can occur. A delayed reaction may be as serious and dangerous as the initial reaction.  HOME CARE INSTRUCTIONS  · Make sure you and your family know how to give an epinephrine injection.  · Use epinephrine injections as directed by your caregiver. Do not use this medicine more often or in larger doses than prescribed.  · Always carry your epinephrine injection or anaphylaxis kit with you. This can be lifesaving if you have a severe reaction.  · Store the medicine in a cool, dry place. If the medicine becomes discolored or cloudy, dispose of it properly and replace it with new medicine.  · Check the expiration date on your medicine. It may be unsafe to use medicines past their expiration date.  · Tell your caregiver about any other medicines you are taking. Some medicines can react badly with epinephrine.  · Tell your caregiver about any medical conditions you have, such as diabetes, high blood pressure (hypertension), heart disease, irregular heartbeats, or if you are pregnant.  SEEK IMMEDIATE MEDICAL CARE IF:  · You have used an epinephrine injection. Call your local emergency services (911 in [place] Even if you improve after the injection, you need to be examined at a hospital emergency department to make sure your allergic reaction is under control. You will also be monitored for adverse effects from the medicine.  · You have chest pain.  · You have irregular or fast heartbeats.  · You have shortness of breath.  · You have severe headaches.  · You have severe nausea, vomiting, or abdominal cramps.  · You have severe pain, swelling, or redness in the area where you gave the injection.     This information is not intended to replace advice given to you by your health care  provider. Make sure you discuss any questions you have with your health care provider.     Document Released: 12/15/2001 Document Revised: 03/11/2013 Document Reviewed: 09/05/2012  Elsevier Interactive Patient Education ©2016 [employer] Inc.

## 2018-02-14 NOTE — ED TRIAGE NOTES
"Pt presents to ED From home via EMS with allergic rxn; pt states taking \"sulfa abx\" and eating seafood before rxn; pt given 50mg benadryl PTA  "

## 2018-02-14 NOTE — ED NOTES
"Aox4. Pt states she feels \"so much better, just exhausted.\" Denies Cp, SOB, or itching. No tongue swelling, no rash.   "

## 2018-02-14 NOTE — ED PROVIDER NOTES
"ED Provider Note    Scribed for Kelvin Herrera M.D. by Donna Arnold. 2/13/2018  6:13 PM    Primary care provider: Kelvin Pearson M.D.  Means of arrival: ambulance   History obtained from: patient   History limited by: none     CHIEF COMPLAINT  Chief Complaint   Patient presents with   • Allergic Reaction       HPI  Bria Martinez is a 42 y.o. female who presents to the Emergency Department via ambulance with complaints of an allergic reaction onset today. Patient reports that she began taking antibiotics for relief of a sinus infection today and states her symptoms suddenly began 5 minutes after. She reports associated nausea, itchiness and redness to her skin. She describes her skin as feeling \"like she is going to crawl out of her skin and that it is burning\". She was given 50 mg of Benadryl prior to arrival with minimal relief. She has had these symptoms in the past with \"more delayed reactions\". No abdominal pain, vomiting, shortness of breath, or chest pain. Patient denies any other acute complaints or concerns.     REVIEW OF SYSTEMS  Pertinent positives include allergic reaction, redness and itching diffusely throughout skin, nausea. Pertinent negatives include no abdominal pain, vomiting, shortness of breath, chest pain.  All other systems reviewed and negative. C    PAST MEDICAL HISTORY   has a past medical history of Acid reflux disease (8/8/2012); Anesthesia (8-9-2017); Breath shortness (8-9-2017); CHEST PAIN (8/8/2012); Dental disorder (8-9-2017); Gynecological disorder; Heart burn; Hypercholesterolemia (8/8/2012); Psychiatric problem (8-9-2017); and Urinary bladder disorder (8-9-2017).    SURGICAL HISTORY   has a past surgical history that includes tonsillectomy; cholecystectomy (2009); appendectomy; breast biopsy (Left); vaginal hysterectomy scope total (8/23/2017); and salpingectomy (Bilateral, 8/23/2017).    SOCIAL HISTORY  Social History   Substance Use Topics   • Smoking status: Current Every " "Day Smoker     Packs/day: 0.50     Years: 20.00     Types: Cigarettes   • Smokeless tobacco: Never Used   • Alcohol use Yes      Comment: RARELY      History   Drug Use No       FAMILY HISTORY  History reviewed. No pertinent family history.    CURRENT MEDICATIONS  Current medications can be reviewed in the nurse's note.     ALLERGIES  Allergies   Allergen Reactions   • Bee Anaphylaxis   • Augmentin Rash     Pt states \"I get a bad rash\".     • Latex Rash     Pt states \"I get a bad rash\".   • Neosporin Lip Health Hives     Blisters and swelling at site   • Other Misc Rash     Nail acrylic- Pt states \"I get a bad rash\".   • Tape Rash     Pt states \"I get a bad rash\".  Paper tape ok       PHYSICAL EXAM  VITAL SIGNS: /63   Pulse (!) 127   Temp 36.9 °C (98.5 °F)   Resp 20   Wt 103.4 kg (228 lb)   SpO2 94%   BMI 40.39 kg/m²   Pulse ox interpretation: Normal   Constitutional: Well developed, Well nourished, No acute distress, Non-toxic appearance.   HENT: Normocephalic, dry mucous membranes Atraumatic, Bilateral external ears normal, No oral exudates, Nose normal. No intraoral lesions  Eyes: PERRLA, EOMI, Conjunctiva normal, No discharge.   Neck: Normal range of motion, No tenderness, Supple, No stridor.   Cardiovascular: Tachycardic heart rate, Normal rhythm, No murmurs, No rubs, No gallops.   Thorax & Lungs: Normal breath sounds, No respiratory distress, No wheezing, No chest tenderness.   Abdomen: Bowel sounds normal, Soft, No tenderness, No masses, No pulsatile masses.   Skin: Warm, Dry, Diffuse erythematous urticarial rash.  Back: No tenderness, No CVA tenderness.   Extremities: Intact distal pulses, No edema, No cyanosis, No clubbing.        COURSE & MEDICAL DECISION MAKING  Pertinent Labs & Imaging studies reviewed. (See chart for details)    6:13 PM - Patient seen and examined at bedside. The patient will be resuscitated with 1L NS IV for tachycardia and dry mucous membranes.     Re-evaluation: Heart " rate improved after IV fluid hydration. Complete resolution of rash. No difficulty with breathing. No wheezing. No abdominal pain, nausea, vomiting. Patient is resting comfortably. She is stable for discharge. Instructed the patient on return to ED precautions and she agrees to be discharged home. There was a good response to IV fluids.      Decision Making:  This is a 42 y.o. year old female who presents with diffuse erythematous urticarial reaction shortly after taking 1st dose of Bactrim for a suspected sinus infection. She has multiple allergies in the past however never had an anaphylactic reaction. She did have Benadryl en route to the hospital by IV. No epinephrine. Does have an epinephrine pen with her. Has never needed to use it. Does not have shortness of breath or wheezing. No hypertension. No nausea or vomiting. No sensation of throat closure.    Patient has diffuse pruritus and burning sensation to her skin. No ulcerations. No intraoral lesions.    Patient was observed here in the emergency department after the Benadryl was administered. She had improvement in her symptoms. No rebound reaction. Likely a reaction to the antibiotic that was prescribed to her. Low likelihood for bacterial source of sinusitis requiring antibiotic intervention at this time. Patient looks much improved with no residual rash symptoms. No vomiting here in the emergency department. No significant hypotension. The patient reports feeling much improved overall. Appears stable for discharge. Was prescribed epinephrine pens to be used as needed. Instructed to stay away from Bactrim from now on as this is likely an allergen. No signs of Fieror-Solo syndrome.    The patient will return for new or worsening symptoms and is stable at the time of discharge. Patient was given return precautions. Patient and/or family member verbalizes understanding and will comply.    Blood pressure 100/63, pulse 98, temperature 37 °C (98.6 °F), resp.  rate 20, weight 103.4 kg (228 lb), SpO2 95 %.      DISPOSITION:  Patient will be discharged home in stable condition.    FOLLOW UP:  Kelvin Pearson M.D.  [address]  [id number]  [place] [address]  [phone]    In 2 days      [facility], Emergency Dept  [address]  [place] [id number] [phone]    As needed, If symptoms worsen      OUTPATIENT MEDICATIONS:  Discharge Medication List as of 2/13/2018  8:43 PM      START taking these medications    Details   EPINEPHrine (EPIPEN) 0.3 MG/0.3ML Solution Auto-injector solution for injection 0.3 mL by Intramuscular route Once for 1 dose., Disp-0.3 mL, R-1, Print Rx Paper           FINAL IMPRESSION  1. Allergic reaction, initial encounter        Donna BRICEÑO (Scribe), am scribing for, and in the presence of, Kelvin Herrera M.D..    Electronically signed by: Donna Arnold (Scribe), 2/13/2018    IKelvin M.D. personally performed the services described in this documentation, as scribed by Donna Arnold in my presence, and it is both accurate and complete.    This dictation has been created using voice recognition software and/or scribes. The accuracy of the dictation is limited by the abilities of the software and the expertise of the scribes. I expect there may be some errors of grammar and possibly content. I made every attempt to manually correct the errors within my dictation. However, errors related to voice recognition software and/or scribes may still exist and should be interpreted within the appropriate context.    The note accurately reflects work and decisions made by me.  Kelvin Herrera  2/14/2018  1:45 AM

## 2018-05-28 ENCOUNTER — HOSPITAL ENCOUNTER (INPATIENT)
Facility: MEDICAL CENTER | Age: 43
LOS: 7 days | DRG: 065 | End: 2018-06-04
Attending: EMERGENCY MEDICINE | Admitting: INTERNAL MEDICINE
Payer: COMMERCIAL

## 2018-05-28 ENCOUNTER — APPOINTMENT (OUTPATIENT)
Dept: RADIOLOGY | Facility: MEDICAL CENTER | Age: 43
DRG: 065 | End: 2018-05-28
Attending: EMERGENCY MEDICINE
Payer: COMMERCIAL

## 2018-05-28 ENCOUNTER — APPOINTMENT (OUTPATIENT)
Dept: RADIOLOGY | Facility: MEDICAL CENTER | Age: 43
DRG: 065 | End: 2018-05-28
Attending: INTERNAL MEDICINE
Payer: COMMERCIAL

## 2018-05-28 DIAGNOSIS — R79.89 ELEVATED LACTIC ACID LEVEL: ICD-10-CM

## 2018-05-28 DIAGNOSIS — R42 DIZZINESS: ICD-10-CM

## 2018-05-28 DIAGNOSIS — E87.29 HIGH ANION GAP METABOLIC ACIDOSIS: ICD-10-CM

## 2018-05-28 DIAGNOSIS — R11.2 NON-INTRACTABLE VOMITING WITH NAUSEA, UNSPECIFIED VOMITING TYPE: ICD-10-CM

## 2018-05-28 DIAGNOSIS — D72.829 LEUKOCYTOSIS, UNSPECIFIED TYPE: ICD-10-CM

## 2018-05-28 DIAGNOSIS — I63.9 CEREBROVASCULAR ACCIDENT (CVA), UNSPECIFIED MECHANISM (HCC): ICD-10-CM

## 2018-05-28 PROBLEM — E87.20 LACTIC ACIDOSIS: Status: ACTIVE | Noted: 2018-05-28

## 2018-05-28 PROBLEM — K52.9 GASTROENTERITIS: Status: ACTIVE | Noted: 2018-05-28

## 2018-05-28 LAB
ALBUMIN SERPL BCP-MCNC: 4.2 G/DL (ref 3.2–4.9)
ALBUMIN/GLOB SERPL: 1.4 G/DL
ALP SERPL-CCNC: 62 U/L (ref 30–99)
ALT SERPL-CCNC: 33 U/L (ref 2–50)
ANION GAP SERPL CALC-SCNC: 15 MMOL/L (ref 0–11.9)
APPEARANCE UR: CLEAR
AST SERPL-CCNC: 32 U/L (ref 12–45)
BASOPHILS # BLD AUTO: 0.3 % (ref 0–1.8)
BASOPHILS # BLD: 0.04 K/UL (ref 0–0.12)
BILIRUB SERPL-MCNC: 0.3 MG/DL (ref 0.1–1.5)
BILIRUB UR QL STRIP.AUTO: NEGATIVE
BUN SERPL-MCNC: 22 MG/DL (ref 8–22)
CALCIUM SERPL-MCNC: 8.8 MG/DL (ref 8.5–10.5)
CHLORIDE SERPL-SCNC: 109 MMOL/L (ref 96–112)
CO2 SERPL-SCNC: 14 MMOL/L (ref 20–33)
COLOR UR: YELLOW
CREAT SERPL-MCNC: 0.67 MG/DL (ref 0.5–1.4)
EKG IMPRESSION: NORMAL
EOSINOPHIL # BLD AUTO: 0.08 K/UL (ref 0–0.51)
EOSINOPHIL NFR BLD: 0.5 % (ref 0–6.9)
ERYTHROCYTE [DISTWIDTH] IN BLOOD BY AUTOMATED COUNT: 40.3 FL (ref 35.9–50)
GLOBULIN SER CALC-MCNC: 3.1 G/DL (ref 1.9–3.5)
GLUCOSE SERPL-MCNC: 119 MG/DL (ref 65–99)
GLUCOSE UR STRIP.AUTO-MCNC: NEGATIVE MG/DL
HCT VFR BLD AUTO: 44.7 % (ref 37–47)
HGB BLD-MCNC: 15.5 G/DL (ref 12–16)
IMM GRANULOCYTES # BLD AUTO: 0.18 K/UL (ref 0–0.11)
IMM GRANULOCYTES NFR BLD AUTO: 1.1 % (ref 0–0.9)
KETONES UR STRIP.AUTO-MCNC: NEGATIVE MG/DL
LACTATE BLD-SCNC: 2.4 MMOL/L (ref 0.5–2)
LEUKOCYTE ESTERASE UR QL STRIP.AUTO: NEGATIVE
LIPASE SERPL-CCNC: 18 U/L (ref 11–82)
LYMPHOCYTES # BLD AUTO: 2.44 K/UL (ref 1–4.8)
LYMPHOCYTES NFR BLD: 15.5 % (ref 22–41)
MCH RBC QN AUTO: 31.4 PG (ref 27–33)
MCHC RBC AUTO-ENTMCNC: 34.7 G/DL (ref 33.6–35)
MCV RBC AUTO: 90.5 FL (ref 81.4–97.8)
MICRO URNS: NORMAL
MONOCYTES # BLD AUTO: 0.96 K/UL (ref 0–0.85)
MONOCYTES NFR BLD AUTO: 6.1 % (ref 0–13.4)
NEUTROPHILS # BLD AUTO: 12.02 K/UL (ref 2–7.15)
NEUTROPHILS NFR BLD: 76.5 % (ref 44–72)
NITRITE UR QL STRIP.AUTO: NEGATIVE
NRBC # BLD AUTO: 0 K/UL
NRBC BLD-RTO: 0 /100 WBC
PH UR STRIP.AUTO: 6.5 [PH]
PLATELET # BLD AUTO: 202 K/UL (ref 164–446)
PMV BLD AUTO: 10.6 FL (ref 9–12.9)
POTASSIUM SERPL-SCNC: 4.5 MMOL/L (ref 3.6–5.5)
PROCALCITONIN SERPL-MCNC: 0.05 NG/ML
PROT SERPL-MCNC: 7.3 G/DL (ref 6–8.2)
PROT UR QL STRIP: NEGATIVE MG/DL
RBC # BLD AUTO: 4.94 M/UL (ref 4.2–5.4)
RBC UR QL AUTO: NEGATIVE
SODIUM SERPL-SCNC: 138 MMOL/L (ref 135–145)
SP GR UR REFRACTOMETRY: >1.045
TROPONIN I SERPL-MCNC: <0.01 NG/ML (ref 0–0.04)
UROBILINOGEN UR STRIP.AUTO-MCNC: 0.2 MG/DL
WBC # BLD AUTO: 15.7 K/UL (ref 4.8–10.8)

## 2018-05-28 PROCEDURE — 93005 ELECTROCARDIOGRAM TRACING: CPT | Performed by: EMERGENCY MEDICINE

## 2018-05-28 PROCEDURE — 74177 CT ABD & PELVIS W/CONTRAST: CPT

## 2018-05-28 PROCEDURE — 81003 URINALYSIS AUTO W/O SCOPE: CPT

## 2018-05-28 PROCEDURE — 85025 COMPLETE CBC W/AUTO DIFF WBC: CPT

## 2018-05-28 PROCEDURE — 80053 COMPREHEN METABOLIC PANEL: CPT

## 2018-05-28 PROCEDURE — 700105 HCHG RX REV CODE 258: Performed by: INTERNAL MEDICINE

## 2018-05-28 PROCEDURE — 700102 HCHG RX REV CODE 250 W/ 637 OVERRIDE(OP): Performed by: INTERNAL MEDICINE

## 2018-05-28 PROCEDURE — 770006 HCHG ROOM/CARE - MED/SURG/GYN SEMI*

## 2018-05-28 PROCEDURE — 83605 ASSAY OF LACTIC ACID: CPT

## 2018-05-28 PROCEDURE — 96361 HYDRATE IV INFUSION ADD-ON: CPT

## 2018-05-28 PROCEDURE — 83690 ASSAY OF LIPASE: CPT

## 2018-05-28 PROCEDURE — 700117 HCHG RX CONTRAST REV CODE 255: Performed by: EMERGENCY MEDICINE

## 2018-05-28 PROCEDURE — 700111 HCHG RX REV CODE 636 W/ 250 OVERRIDE (IP): Performed by: EMERGENCY MEDICINE

## 2018-05-28 PROCEDURE — 99407 BEHAV CHNG SMOKING > 10 MIN: CPT | Performed by: INTERNAL MEDICINE

## 2018-05-28 PROCEDURE — 99223 1ST HOSP IP/OBS HIGH 75: CPT | Mod: 25 | Performed by: INTERNAL MEDICINE

## 2018-05-28 PROCEDURE — 84145 PROCALCITONIN (PCT): CPT

## 2018-05-28 PROCEDURE — A9270 NON-COVERED ITEM OR SERVICE: HCPCS | Performed by: INTERNAL MEDICINE

## 2018-05-28 PROCEDURE — 96375 TX/PRO/DX INJ NEW DRUG ADDON: CPT

## 2018-05-28 PROCEDURE — 700105 HCHG RX REV CODE 258: Performed by: EMERGENCY MEDICINE

## 2018-05-28 PROCEDURE — 87040 BLOOD CULTURE FOR BACTERIA: CPT

## 2018-05-28 PROCEDURE — 84484 ASSAY OF TROPONIN QUANT: CPT

## 2018-05-28 PROCEDURE — 71045 X-RAY EXAM CHEST 1 VIEW: CPT

## 2018-05-28 PROCEDURE — 70551 MRI BRAIN STEM W/O DYE: CPT

## 2018-05-28 PROCEDURE — 99285 EMERGENCY DEPT VISIT HI MDM: CPT

## 2018-05-28 PROCEDURE — 96374 THER/PROPH/DIAG INJ IV PUSH: CPT

## 2018-05-28 RX ORDER — BISACODYL 10 MG
10 SUPPOSITORY, RECTAL RECTAL
Status: DISCONTINUED | OUTPATIENT
Start: 2018-05-28 | End: 2018-06-04 | Stop reason: HOSPADM

## 2018-05-28 RX ORDER — ONDANSETRON 2 MG/ML
4 INJECTION INTRAMUSCULAR; INTRAVENOUS EVERY 4 HOURS PRN
Status: DISCONTINUED | OUTPATIENT
Start: 2018-05-28 | End: 2018-05-29

## 2018-05-28 RX ORDER — ACETAMINOPHEN 325 MG/1
650 TABLET ORAL EVERY 6 HOURS PRN
Status: DISCONTINUED | OUTPATIENT
Start: 2018-05-28 | End: 2018-06-04 | Stop reason: HOSPADM

## 2018-05-28 RX ORDER — IBUPROFEN 200 MG
400-600 TABLET ORAL EVERY 6 HOURS PRN
COMMUNITY
End: 2022-08-16

## 2018-05-28 RX ORDER — NICOTINE 21 MG/24HR
14 PATCH, TRANSDERMAL 24 HOURS TRANSDERMAL
Status: DISCONTINUED | OUTPATIENT
Start: 2018-05-28 | End: 2018-06-04 | Stop reason: HOSPADM

## 2018-05-28 RX ORDER — SODIUM CHLORIDE 9 MG/ML
INJECTION, SOLUTION INTRAVENOUS CONTINUOUS
Status: DISCONTINUED | OUTPATIENT
Start: 2018-05-28 | End: 2018-05-29

## 2018-05-28 RX ORDER — ONDANSETRON 4 MG/1
4 TABLET, ORALLY DISINTEGRATING ORAL EVERY 4 HOURS PRN
Status: DISCONTINUED | OUTPATIENT
Start: 2018-05-28 | End: 2018-06-04 | Stop reason: HOSPADM

## 2018-05-28 RX ORDER — MECLIZINE HYDROCHLORIDE 25 MG/1
25 TABLET ORAL 3 TIMES DAILY PRN
Status: DISCONTINUED | OUTPATIENT
Start: 2018-05-28 | End: 2018-06-04 | Stop reason: HOSPADM

## 2018-05-28 RX ORDER — POLYETHYLENE GLYCOL 3350 17 G/17G
1 POWDER, FOR SOLUTION ORAL
Status: DISCONTINUED | OUTPATIENT
Start: 2018-05-28 | End: 2018-06-04 | Stop reason: HOSPADM

## 2018-05-28 RX ORDER — PROMETHAZINE HYDROCHLORIDE 25 MG/1
12.5-25 SUPPOSITORY RECTAL EVERY 4 HOURS PRN
Status: DISCONTINUED | OUTPATIENT
Start: 2018-05-28 | End: 2018-06-04 | Stop reason: HOSPADM

## 2018-05-28 RX ORDER — PROMETHAZINE HYDROCHLORIDE 25 MG/1
12.5-25 TABLET ORAL EVERY 4 HOURS PRN
Status: DISCONTINUED | OUTPATIENT
Start: 2018-05-28 | End: 2018-06-04 | Stop reason: HOSPADM

## 2018-05-28 RX ORDER — SODIUM CHLORIDE, SODIUM LACTATE, POTASSIUM CHLORIDE, CALCIUM CHLORIDE 600; 310; 30; 20 MG/100ML; MG/100ML; MG/100ML; MG/100ML
1000 INJECTION, SOLUTION INTRAVENOUS ONCE
Status: COMPLETED | OUTPATIENT
Start: 2018-05-28 | End: 2018-05-28

## 2018-05-28 RX ORDER — AMOXICILLIN 250 MG
2 CAPSULE ORAL 2 TIMES DAILY
Status: DISCONTINUED | OUTPATIENT
Start: 2018-05-28 | End: 2018-06-04 | Stop reason: HOSPADM

## 2018-05-28 RX ORDER — ONDANSETRON 2 MG/ML
4 INJECTION INTRAMUSCULAR; INTRAVENOUS ONCE
Status: COMPLETED | OUTPATIENT
Start: 2018-05-28 | End: 2018-05-28

## 2018-05-28 RX ORDER — METOCLOPRAMIDE HYDROCHLORIDE 5 MG/ML
10 INJECTION INTRAMUSCULAR; INTRAVENOUS ONCE
Status: COMPLETED | OUTPATIENT
Start: 2018-05-28 | End: 2018-05-28

## 2018-05-28 RX ADMIN — METOCLOPRAMIDE 10 MG: 5 INJECTION, SOLUTION INTRAMUSCULAR; INTRAVENOUS at 03:15

## 2018-05-28 RX ADMIN — SODIUM CHLORIDE: 9 INJECTION, SOLUTION INTRAVENOUS at 18:54

## 2018-05-28 RX ADMIN — SODIUM CHLORIDE: 9 INJECTION, SOLUTION INTRAVENOUS at 10:34

## 2018-05-28 RX ADMIN — IOHEXOL 100 ML: 350 INJECTION, SOLUTION INTRAVENOUS at 04:34

## 2018-05-28 RX ADMIN — ONDANSETRON 4 MG: 2 INJECTION INTRAMUSCULAR; INTRAVENOUS at 06:24

## 2018-05-28 RX ADMIN — SODIUM CHLORIDE, POTASSIUM CHLORIDE, SODIUM LACTATE AND CALCIUM CHLORIDE 1000 ML: 600; 310; 30; 20 INJECTION, SOLUTION INTRAVENOUS at 04:30

## 2018-05-28 ASSESSMENT — ENCOUNTER SYMPTOMS
NAUSEA: 1
BLURRED VISION: 0
FLANK PAIN: 0
ABDOMINAL PAIN: 0
SEIZURES: 0
SPUTUM PRODUCTION: 0
DIAPHORESIS: 0
DEPRESSION: 0
NECK PAIN: 0
BRUISES/BLEEDS EASILY: 0
MYALGIAS: 0
FOCAL WEAKNESS: 0
FEVER: 0
PALPITATIONS: 0
VOMITING: 1
HEADACHES: 0
DIARRHEA: 0
BACK PAIN: 0
WHEEZING: 0
SHORTNESS OF BREATH: 0
CHILLS: 1
BLOOD IN STOOL: 0
SORE THROAT: 0
COUGH: 1
DIZZINESS: 1

## 2018-05-28 ASSESSMENT — PAIN SCALES - GENERAL
PAINLEVEL_OUTOF10: 0
PAINLEVEL_OUTOF10: 0
PAINLEVEL_OUTOF10: 2
PAINLEVEL_OUTOF10: 4

## 2018-05-28 ASSESSMENT — PATIENT HEALTH QUESTIONNAIRE - PHQ9
SUM OF ALL RESPONSES TO PHQ9 QUESTIONS 1 AND 2: 0
2. FEELING DOWN, DEPRESSED, IRRITABLE, OR HOPELESS: NOT AT ALL
SUM OF ALL RESPONSES TO PHQ9 QUESTIONS 1 AND 2: 0
1. LITTLE INTEREST OR PLEASURE IN DOING THINGS: NOT AT ALL
2. FEELING DOWN, DEPRESSED, IRRITABLE, OR HOPELESS: NOT AT ALL
1. LITTLE INTEREST OR PLEASURE IN DOING THINGS: NOT AT ALL

## 2018-05-28 ASSESSMENT — COPD QUESTIONNAIRES
HAVE YOU SMOKED AT LEAST 100 CIGARETTES IN YOUR ENTIRE LIFE: YES
DURING THE PAST 4 WEEKS HOW MUCH DID YOU FEEL SHORT OF BREATH: SOME OF THE TIME
COPD SCREENING SCORE: 4
IN THE PAST 12 MONTHS DO YOU DO LESS THAN YOU USED TO BECAUSE OF YOUR BREATHING PROBLEMS: DISAGREE/UNSURE
DO YOU EVER COUGH UP ANY MUCUS OR PHLEGM?: YES, A FEW DAYS A WEEK OR MONTH
DURING THE PAST 4 WEEKS HOW MUCH DID YOU FEEL SHORT OF BREATH: SOME OF THE TIME
COPD SCREENING SCORE: 4
HAVE YOU SMOKED AT LEAST 100 CIGARETTES IN YOUR ENTIRE LIFE: YES
DO YOU EVER COUGH UP ANY MUCUS OR PHLEGM?: YES, A FEW DAYS A WEEK OR MONTH

## 2018-05-28 ASSESSMENT — LIFESTYLE VARIABLES
EVER_SMOKED: YES
EVER_SMOKED: YES

## 2018-05-28 NOTE — ED NOTES
Assumed care of patient. Admitting MD at bedside. Pt nauseated and dry heaving. Provided with more emesis bags.

## 2018-05-28 NOTE — PROGRESS NOTES
Patient arrived to the unit from the ED, VSS, AA&O x4. Skin is intact, 2 RN skin check complete with Allyson MAYORGA. PIV patent infusing NS at 125 ML/HR. POC discussed, safety measures in place, call light in reach as well as phone. Hourly rounding in use

## 2018-05-28 NOTE — ED PROVIDER NOTES
"ED Provider Note    CHIEF COMPLAINT  Chief Complaint   Patient presents with   • Dizziness   • Nausea/Vomiting/Diarrhea        [facility]    Primary care provider: Kelvin Pearson M.D.   History obtained from: Patient and her significant other  History limited by: None     Bria Martinez is a 42 y.o. female who presents to the ED by EMS for sudden onset of dizziness along with nausea and vomiting about 2 hours prior to arrival.  Patient's significant other states that patient appeared fine earlier tonight.  Patient states that she was getting a box down from a shelf in the garage when her symptoms came on suddenly.  She describes the dizziness as both feeling lightheaded and things spinning around.  She reports upper abdominal pain along with more than 10 episodes of vomiting.  She did have a little bit of diarrhea as well.  She denies possibility of pregnancy with history of hysterectomy.  She denies any recent dysuria.  She denies any recent fever/congestion.  She has a cough \"here and there at times.\"  She also reports shortness of breath \"at times.\"  She was given nausea medicine by EMS but continues to complain of nausea and pain.  She has not noticed anything that makes her symptoms better or worse.    REVIEW OF SYSTEMS  Please see HPI for pertinent positives/negatives.  All other systems reviewed and are negative.     PAST MEDICAL HISTORY  Past Medical History:   Diagnosis Date   • Anesthesia 8-9-2017    uncontrollable shaking   • Dental disorder 8-9-2017    upper partial   • Breath shortness 8-9-2017    no oxygen usage   • Urinary bladder disorder 8-9-2017    leakage with sneezing/movement   • Psychiatric problem 8-9-2017    anxiety; and panic attacks   • CHEST PAIN 8/8/2012   • Hypercholesterolemia 8/8/2012   • Acid reflux disease 8/8/2012   • Gynecological disorder    • Heart burn         SURGICAL HISTORY  Past Surgical History:   Procedure Laterality Date   • VAGINAL HYSTERECTOMY SCOPE TOTAL  8/23/2017    " "Procedure: VAGINAL HYSTERECTOMY SCOPE TOTAL ;  Surgeon: Davin Liriano M.D.;  Location: SURGERY [facility];  Service:    • SALPINGECTOMY Bilateral 8/23/2017    Procedure: SALPINGECTOMY;  Surgeon: Davin Liriano M.D.;  Location: SURGERY [facility];  Service:    • CHOLECYSTECTOMY  2009   • APPENDECTOMY     • BREAST BIOPSY Left    • TONSILLECTOMY          SOCIAL HISTORY  Social History     Social History Main Topics   • Smoking status: Current Every Day Smoker     Packs/day: 1.00     Years: 20.00     Types: Cigarettes   • Smokeless tobacco: Never Used   • Alcohol use Yes      Comment: RARELY   • Drug use: No   • Sexual activity: Not on file        FAMILY HISTORY  History reviewed. No pertinent family history.     CURRENT MEDICATIONS  Home Medications    **Home medications have not yet been reviewed for this encounter**          ALLERGIES  Allergies   Allergen Reactions   • Bee Anaphylaxis   • Augmentin Rash     Pt states \"I get a bad rash\".     • Latex Rash     Pt states \"I get a bad rash\".   • Neosporin Lip Health Hives     Blisters and swelling at site   • Other Misc Rash     Nail acrylic- Pt states \"I get a bad rash\".   • Tape Rash     Pt states \"I get a bad rash\".  Paper tape ok        PHYSICAL EXAM  VITAL SIGNS: /74   Pulse 89   Temp 36 °C (96.8 °F)   Resp 20   Ht 1.575 m (5' 2\")   Wt 103.4 kg (227 lb 15.3 oz)   SpO2 93%   BMI 41.69 kg/m²  @TUAN[[id number]::@     Pulse ox interpretation: 97% I interpret this pulse ox as normal     Constitutional: Well developed, well nourished, alert in no apparent distress, nontoxic appearance    HENT: No external signs of trauma, normocephalic, bilateral external ears normal, TMs are clear bilaterally, oropharynx moist and clear, nose normal    Eyes: PERRL, EOMI, vision and visual fields are grossly intact, conjunctiva without erythema, no discharge, no icterus    Neck: Soft and supple, trachea midline, no stridor, no tenderness, no LAD, no JVD, good ROM "    Cardiovascular: Regular rate and rhythm, no murmurs/rubs/gallops, strong distal pulses and good perfusion    Thorax & Lungs: No respiratory distress, CTAB    Abdomen: Soft, mild epigastric tenderness to palpation, nondistended, no guarding, no rebound, normal BS     Extremities: No cyanosis, no edema, no gross deformity, good ROM, no tenderness, intact distal pulses with brisk cap refill    Skin: Warm, dry, no pallor/cyanosis, no rash noted    Lymphatic: No lymphadenopathy noted    Neuro: Alert and oriented to person, place, and time.  GCS 15.  CN II-XII grossly intact.  Normal speech.  Equal strength bilateral UE/LE.  Sensation intact to touch.  No cerebellar signs.   Psychiatric: Cooperative, anxious patient        DIAGNOSTIC STUDIES / PROCEDURES    EKG  12 Lead EKG obtained at 0250 and interpreted by me:   Rate: 82   Rhythm: Sinus rhythm   Ectopy: None  Intervals: Normal   Axis: Normal   QRS: Normal   ST segments: Normal  T Waves: Normal    Clinical Impression: Sinus rhythm with baseline artifact, no acute ST-T wave changes       LABS  All labs reviewed by me.     Results for orders placed or performed during the hospital encounter of 05/28/18   CBC WITH DIFFERENTIAL   Result Value Ref Range    WBC 15.7 (H) 4.8 - 10.8 K/uL    RBC 4.94 4.20 - 5.40 M/uL    Hemoglobin 15.5 12.0 - 16.0 g/dL    Hematocrit 44.7 37.0 - 47.0 %    MCV 90.5 81.4 - 97.8 fL    MCH 31.4 27.0 - 33.0 pg    MCHC 34.7 33.6 - 35.0 g/dL    RDW 40.3 35.9 - 50.0 fL    Platelet Count 202 164 - 446 K/uL    MPV 10.6 9.0 - 12.9 fL    Neutrophils-Polys 76.50 (H) 44.00 - 72.00 %    Lymphocytes 15.50 (L) 22.00 - 41.00 %    Monocytes 6.10 0.00 - 13.40 %    Eosinophils 0.50 0.00 - 6.90 %    Basophils 0.30 0.00 - 1.80 %    Immature Granulocytes 1.10 (H) 0.00 - 0.90 %    Nucleated RBC 0.00 /100 WBC    Neutrophils (Absolute) 12.02 (H) 2.00 - 7.15 K/uL    Lymphs (Absolute) 2.44 1.00 - 4.80 K/uL    Monos (Absolute) 0.96 (H) 0.00 - 0.85 K/uL    Eos (Absolute)  0.08 0.00 - 0.51 K/uL    Baso (Absolute) 0.04 0.00 - 0.12 K/uL    Immature Granulocytes (abs) 0.18 (H) 0.00 - 0.11 K/uL    NRBC (Absolute) 0.00 K/uL   COMP METABOLIC PANEL   Result Value Ref Range    Bun 22 8 - 22 mg/dL    Sodium 138 135 - 145 mmol/L    Potassium 4.5 3.6 - 5.5 mmol/L    Chloride 109 96 - 112 mmol/L    Co2 14 (L) 20 - 33 mmol/L    Anion Gap 15.0 (H) 0.0 - 11.9    Glucose 119 (H) 65 - 99 mg/dL    Creatinine 0.67 0.50 - 1.40 mg/dL    Calcium 8.8 8.5 - 10.5 mg/dL    AST(SGOT) 32 12 - 45 U/L    ALT(SGPT) 33 2 - 50 U/L    Alkaline Phosphatase 62 30 - 99 U/L    Total Bilirubin 0.3 0.1 - 1.5 mg/dL    Albumin 4.2 3.2 - 4.9 g/dL    Total Protein 7.3 6.0 - 8.2 g/dL    Globulin 3.1 1.9 - 3.5 g/dL    A-G Ratio 1.4 g/dL   LIPASE   Result Value Ref Range    Lipase 18 11 - 82 U/L   URINALYSIS CULTURE, IF INDICATED   Result Value Ref Range    Micro Urine Req see below     Color Yellow     Character Clear     Ph 6.5 5.0 - 8.0    Glucose Negative Negative mg/dL    Ketones Negative Negative mg/dL    Protein Negative Negative mg/dL    Bilirubin Negative Negative    Urobilinogen, Urine 0.2 Negative    Nitrite Negative Negative    Leukocyte Esterase Negative Negative    Occult Blood Negative Negative   LACTIC ACID   Result Value Ref Range    Lactic Acid 2.4 (H) 0.5 - 2.0 mmol/L   TROPONIN   Result Value Ref Range    Troponin I <0.01 0.00 - 0.04 ng/mL   PROCALCITONIN   Result Value Ref Range    Procalcitonin 0.05 <0.25 ng/mL   ESTIMATED GFR   Result Value Ref Range    GFR If African American >60 >60 mL/min/1.73 m 2    GFR If Non African American >60 >60 mL/min/1.73 m 2   REFRACTOMETER SG   Result Value Ref Range    Specific Gravity >1.045    EKG (NOW)   Result Value Ref Range    Report       [facility] Emergency Dept.    Test Date:  2018-05-28  Pt Name:    KAMAR POWELL             Department:   MRN:        [id number]                      Room:       [facility]  Gender:     Female                        Technician: [id number]  :        1975                   Requested By:KIM MANSFIELD  Order #:    [id number]                    Reading MD: Kim Mansfield    Measurements  Intervals                                Axis  Rate:       82                           P:          20  OH:         188                          QRS:        5  QRSD:       82                           T:          6  QT:         408  QTc:        477    Interpretive Statements  SINUS RHYTHM  Compared to ECG 2017 08:49:18  No significant changes    Electronically Signed On 2018 5:43:55 PDT by Kim Mansfield          RADIOLOGY  The radiologist's interpretation of all radiological studies have been reviewed by me.     CT-ABDOMEN-PELVIS WITH   Final Result         1.  No acute abnormality.   2.  5 mm low-density lesion in the dome of the liver, stable since prior study, could represent small cyst or hemangioma, otherwise indeterminate and too small to definitively characterize.   3.  Bilateral ovarian cysts, greater on the right, decreased in size on the right compared to prior study.   4.  Atherosclerosis      DX-CHEST-PORTABLE (1 VIEW)   Final Result         1.  No focal infiltrates.   2.  Perihilar interstitial prominence and bronchial wall cuffing, appearance suggests changes of underlying bronchial inflammation, consider bronchitis.             COURSE & MEDICAL DECISION MAKING  Nursing notes, VS, PMSFHx reviewed in chart.     Review of past medical records shows the patient was admitted 2017 and discharged on 2017 for nausea, vomiting and diarrhea.  CT abdomen/pelvis on 2017 showed left ovarian cyst and findings of possible gastroenteritis.    Differential diagnoses considered include but are not limited to: Vertigo, labyrinthitis, Ménière's disease, vestibular neuronitis, CVA/TIA, tumor, dysrhythmia, bleeding/anemia, dehydration, syncope/near syncope       0725: D/W Dr. Pond, hospitalist, who will admit  patient      Patient presents with her significant other to the ED with above complaint.  EKG without evidence of acute ischemic changes.  Chest x-ray and CT abdomen/pelvis with findings as above.  Labs showed leukocytosis, and ion gap acidosis and elevated lactic acid but with normal procalcitonin.  Patient was given IV fluids due to her elevated lactic acid and concern for sepsis.  She was given Reglan with improvement of her nausea but her nausea returned and she was given Zofran.  I discussed the findings with the patient.  She is still not feeling well and she is concerned about sepsis.  Patient states that she has sepsis last year.  Patient is agreeable to admission.  Discussed with Dr. Pond who graciously agreed to admit patient for further care.        FINAL IMPRESSION  1. Dizziness    2. Non-intractable vomiting with nausea, unspecified vomiting type    3. Elevated lactic acid level    4. Leukocytosis, unspecified type    5. High anion gap metabolic acidosis           DISPOSITION  Patient will be admitted by hospitalist for further care      Electronically signed by: Chris Tejeda, 5/28/2018 2:38 AM      Portions of this record were made with voice recognition software.  Despite my review, spelling/grammar/context errors may still remain.  Interpretation of this chart should be taken in this context.

## 2018-05-28 NOTE — ED NOTES
Med rec updated and complete  Allergies reviewed  Interviewed pt with boyfriend at bedside with permission from pt.  Pt reports no vitamins.  Pt reports no antibiotics in the last 30 days.

## 2018-05-28 NOTE — H&P
Hospital Medicine History and Physical    Date of Service  5/28/2018    Chief Complaint  Chief Complaint   Patient presents with   • Dizziness   • Nausea/Vomiting/Diarrhea       History of Presenting Illness  42 y.o. female who presented 5/28/2018 with sudden onset of dizziness that started last night. She describes feeling lightheaded and also a sensation of the room spinning around. She also reports nausea and vomiting, she has had 12 episodes of nonbloody vomiting overnight. She also reports some chills but no fever. She denies any abdominal pain or diarrhea. She denies any chest pain or shortness of breath and reports a mild occasional cough. She denies any headache, vision changes, hearing loss, tinnitus, numbness or focal weakness. In the ER she continued to feel lightheaded and is having ongoing nausea and vomiting. Initial workup revealed WBC 15.7, anion gap 15, glucose 119, lactic acid 2.4, Propulsid 20 0.05, UA negative for infection. CT abdomen pelvis revealed no acute abnormality. Chest x-ray revealed interstitial prominence but no focal infiltrates.      Primary Care Physician  Kelvin Pearson M.D.    Consultants  None    Code Status  Full Code    Review of Systems  Review of Systems   Constitutional: Positive for chills and malaise/fatigue. Negative for diaphoresis and fever.   HENT: Negative for hearing loss and sore throat.    Eyes: Negative for blurred vision.   Respiratory: Positive for cough. Negative for sputum production, shortness of breath and wheezing.    Cardiovascular: Negative for chest pain, palpitations and leg swelling.   Gastrointestinal: Positive for nausea and vomiting. Negative for abdominal pain, blood in stool and diarrhea.   Genitourinary: Negative for dysuria, flank pain and urgency.   Musculoskeletal: Negative for back pain, joint pain, myalgias and neck pain.   Skin: Negative for rash.   Neurological: Positive for dizziness. Negative for focal weakness, seizures and  headaches.   Endo/Heme/Allergies: Does not bruise/bleed easily.   Psychiatric/Behavioral: Negative for depression and suicidal ideas.   All other systems reviewed and are negative.       Past Medical History  Past Medical History:   Diagnosis Date   • Anesthesia 8-9-2017    uncontrollable shaking   • Dental disorder 8-9-2017    upper partial   • Breath shortness 8-9-2017    no oxygen usage   • Urinary bladder disorder 8-9-2017    leakage with sneezing/movement   • Psychiatric problem 8-9-2017    anxiety; and panic attacks   • CHEST PAIN 8/8/2012   • Hypercholesterolemia 8/8/2012   • Acid reflux disease 8/8/2012   • Gynecological disorder    • Heart burn        Surgical History  Past Surgical History:   Procedure Laterality Date   • VAGINAL HYSTERECTOMY SCOPE TOTAL  8/23/2017    Procedure: VAGINAL HYSTERECTOMY SCOPE TOTAL ;  Surgeon: Davin Liriano M.D.;  Location: SURGERY [facility];  Service:    • SALPINGECTOMY Bilateral 8/23/2017    Procedure: SALPINGECTOMY;  Surgeon: Davin Liriano M.D.;  Location: SURGERY [facility];  Service:    • CHOLECYSTECTOMY  2009   • APPENDECTOMY     • BREAST BIOPSY Left    • TONSILLECTOMY         Medications  No current facility-administered medications on file prior to encounter.      Current Outpatient Prescriptions on File Prior to Encounter   Medication Sig Dispense Refill   • potassium chloride SA (K-DUR) 20 MEQ Tab CR Take 0.5 Tabs by mouth every day. 30 Tab 1   • furosemide (LASIX) 20 MG Tab Take 20 mg by mouth as needed. Indications: Edema         Family History  History reviewed. Noncontributory to patient's symptoms of lightheadedness    Social History  Social History   Substance Use Topics   • Smoking status: Current Every Day Smoker     Packs/day: 1.00     Years: 20.00     Types: Cigarettes   • Smokeless tobacco: Never Used   • Alcohol use Yes      Comment: RARELY       Allergies  Allergies   Allergen Reactions   • Bee Anaphylaxis   • Augmentin Rash     Pt states  "\"I get a bad rash\".     • Latex Rash     Pt states \"I get a bad rash\".   • Neosporin Lip Health Hives     Blisters and swelling at site   • Other Misc Rash     Nail acrylic- Pt states \"I get a bad rash\".   • Tape Rash     Pt states \"I get a bad rash\".  Paper tape ok        Physical Exam  Laboratory   Hemodynamics  Temp (24hrs), Av °C (96.8 °F), Min:36 °C (96.8 °F), Max:36 °C (96.8 °F)   Temperature: 36 °C (96.8 °F)  Pulse  Av.5  Min: 77  Max: 89 Heart Rate (Monitored): 89  Blood Pressure: 106/74, NIBP: 102/75      Respiratory      Respiration: 20, Pulse Oximetry: 93 %             Physical Exam   Constitutional: She is oriented to person, place, and time. She appears well-developed and well-nourished. She appears distressed.   HENT:   Head: Normocephalic and atraumatic.   Oral mucous membranes are dry   Eyes: Conjunctivae are normal. Pupils are equal, round, and reactive to light.   No nystagmus noted   Neck: Normal range of motion. Neck supple.   Cardiovascular: Normal rate, regular rhythm and normal heart sounds.    Pulmonary/Chest: Effort normal and breath sounds normal. No respiratory distress. She has no wheezes. She has no rales.   Abdominal: Soft. Bowel sounds are normal. She exhibits no distension. There is no tenderness. There is no rebound.   Musculoskeletal: Normal range of motion. She exhibits no edema or tenderness.   Neurological: She is alert and oriented to person, place, and time. No cranial nerve deficit. Coordination normal.   Strength and sensation intact bilaterally   Skin: Skin is warm and dry.   Psychiatric: She has a normal mood and affect. Her behavior is normal.   Nursing note and vitals reviewed.      Recent Labs      18   0350   WBC  15.7*   RBC  4.94   HEMOGLOBIN  15.5   HEMATOCRIT  44.7   MCV  90.5   MCH  31.4   MCHC  34.7   RDW  40.3   PLATELETCT  202   MPV  10.6     Recent Labs      18   0350   SODIUM  138   POTASSIUM  4.5   CHLORIDE  109   CO2  14*   GLUCOSE  119* "   BUN  22   CREATININE  0.67   CALCIUM  8.8     Recent Labs      05/28/18   0350   ALTSGPT  33   ASTSGOT  32   ALKPHOSPHAT  62   TBILIRUBIN  0.3   LIPASE  18   GLUCOSE  119*                 Lab Results   Component Value Date    TROPONINI <0.01 05/28/2018     Urinalysis:    Lab Results  Component Value Date/Time   SPECGRAVITY >1.045 05/28/2018 0650   GLUCOSEUR Negative 05/28/2018 0650   KETONES Negative 05/28/2018 0650   NITRITE Negative 05/28/2018 0650   EPITHELCELL Few 01/02/2017 1445        Imaging  CT-ABDOMEN-PELVIS WITH   Final Result         1.  No acute abnormality.   2.  5 mm low-density lesion in the dome of the liver, stable since prior study, could represent small cyst or hemangioma, otherwise indeterminate and too small to definitively characterize.   3.  Bilateral ovarian cysts, greater on the right, decreased in size on the right compared to prior study.   4.  Atherosclerosis      DX-CHEST-PORTABLE (1 VIEW)   Final Result         1.  No focal infiltrates.   2.  Perihilar interstitial prominence and bronchial wall cuffing, appearance suggests changes of underlying bronchial inflammation, consider bronchitis.      MR-BRAIN-W/O    (Results Pending)        Assessment/Plan     I anticipate this patient will require at least two midnights for appropriate medical management, necessitating inpatient admission.    Gastroenteritis- (present on admission)   Assessment & Plan    With ongoing nausea and vomiting  Pro calcitonin less than 0.05, likely viral in etiology  I will start the patient on IV fluid hydration with normal saline  Zofran and promethazine and Compazine for nausea and vomiting when necessary          Lactic acidosis- (present on admission)   Assessment & Plan    Patient is not septic at this time  Continue IV fluid hydration and trend lactate to resolution        Vertigo- (present on admission)   Assessment & Plan    BPPV versus vestibular neuritis vs central vertigo vs lightheadedness in the  setting of dehydration  I will order an MRI brain for further evaluation  Meclizine when necessary  If patient has persistent symptoms despite supportive care, consider starting on prednisone therapy for possible vestibular neuritis  Physical therapy for vestibular rehab        Leukocytosis- (present on admission)   Assessment & Plan    Likely secondary to gastroenteritis  Continue to monitor CBC          Tobacco dependence- (present on admission)   Assessment & Plan    Tobacco cessation education provided for more than 10 minutes, discussed options of nicotine patch, medical treatment with wellbutrin and chantix. Discussed the benefits of quitting smoking. Nicotine replacement protocol will be provided to the patient.              VTE prophylaxis: SCD.

## 2018-05-28 NOTE — ED TRIAGE NOTES
Pt comes to ED via EMS for dizziness with nausea and vomiting that started 2 hours prior to arrival after lifting a box off a shelf in her garage.  Pt has history of GI sepsis 1 year ago.

## 2018-05-28 NOTE — CARE PLAN
Problem: Communication  Goal: The ability to communicate needs accurately and effectively will improve    Intervention: Educate patient and significant other/support system about the plan of care, procedures, treatments, medications and allow for questions  White board updated with day staff and return time.  PT notified of hourly rounding and unit routine.   Appropriate signs in place at doorway for pt.       Problem: Safety  Goal: Will remain free from falls    Intervention: Implement fall precautions  Pt calls appropriately, treaded socks in use. Personal belongings and call light with in reach and bed is locked in lowest position.  Hourly rounding in place      Problem: Fluid Volume:  Goal: Will maintain balanced intake and output    Intervention: Monitor, educate, and encourage compliance with therapeutic intake of liquids  NS running at 125 ml/hr. Encouraged patient to stay hydrated with PO liquids that are close and within reach.

## 2018-05-28 NOTE — ED NOTES
Pt with incontinence of urine while vomiting. Pt states she has to have a BM and unable to use the bedpan. Pt helped up to bedside commode, standby assist. Linens changed and pt had a loose BM. Back to bed and provided with cold towel for forehead and ice chips for cotton mouth. On monitor with alarms audible. Call light within reach. Awaiting admission.

## 2018-05-28 NOTE — ED NOTES
MRI checklist faxed. Pt sleeping on gurney with even respirations.  at bedside updated on POC. Call light within reach.

## 2018-05-29 ENCOUNTER — APPOINTMENT (OUTPATIENT)
Dept: RADIOLOGY | Facility: MEDICAL CENTER | Age: 43
DRG: 065 | End: 2018-05-29
Attending: HOSPITALIST
Payer: COMMERCIAL

## 2018-05-29 PROBLEM — R51.9 HEADACHE: Status: ACTIVE | Noted: 2018-05-29

## 2018-05-29 PROBLEM — I63.9 CVA (CEREBRAL VASCULAR ACCIDENT) (HCC): Status: ACTIVE | Noted: 2018-05-29

## 2018-05-29 LAB
ANION GAP SERPL CALC-SCNC: 5 MMOL/L (ref 0–11.9)
BASOPHILS # BLD AUTO: 0.5 % (ref 0–1.8)
BASOPHILS # BLD: 0.09 K/UL (ref 0–0.12)
BUN SERPL-MCNC: 14 MG/DL (ref 8–22)
CALCIUM SERPL-MCNC: 8.4 MG/DL (ref 8.5–10.5)
CHLORIDE SERPL-SCNC: 110 MMOL/L (ref 96–112)
CO2 SERPL-SCNC: 22 MMOL/L (ref 20–33)
CREAT SERPL-MCNC: 0.64 MG/DL (ref 0.5–1.4)
EOSINOPHIL # BLD AUTO: 0.1 K/UL (ref 0–0.51)
EOSINOPHIL NFR BLD: 0.5 % (ref 0–6.9)
ERYTHROCYTE [DISTWIDTH] IN BLOOD BY AUTOMATED COUNT: 41.2 FL (ref 35.9–50)
EST. AVERAGE GLUCOSE BLD GHB EST-MCNC: 126 MG/DL
GLUCOSE SERPL-MCNC: 96 MG/DL (ref 65–99)
HBA1C MFR BLD: 6 % (ref 0–5.6)
HCT VFR BLD AUTO: 38.4 % (ref 37–47)
HGB BLD-MCNC: 13 G/DL (ref 12–16)
IMM GRANULOCYTES # BLD AUTO: 0.11 K/UL (ref 0–0.11)
IMM GRANULOCYTES NFR BLD AUTO: 0.6 % (ref 0–0.9)
LV EJECT FRACT  99904: 65
LV EJECT FRACT MOD 2C 99903: 75.93
LV EJECT FRACT MOD 4C 99902: 67.53
LV EJECT FRACT MOD BP 99901: 67.7
LYMPHOCYTES # BLD AUTO: 3.62 K/UL (ref 1–4.8)
LYMPHOCYTES NFR BLD: 18.2 % (ref 22–41)
MCH RBC QN AUTO: 31 PG (ref 27–33)
MCHC RBC AUTO-ENTMCNC: 33.9 G/DL (ref 33.6–35)
MCV RBC AUTO: 91.4 FL (ref 81.4–97.8)
MONOCYTES # BLD AUTO: 1.53 K/UL (ref 0–0.85)
MONOCYTES NFR BLD AUTO: 7.7 % (ref 0–13.4)
NEUTROPHILS # BLD AUTO: 14.41 K/UL (ref 2–7.15)
NEUTROPHILS NFR BLD: 72.5 % (ref 44–72)
NRBC # BLD AUTO: 0 K/UL
NRBC BLD-RTO: 0 /100 WBC
PLATELET # BLD AUTO: 205 K/UL (ref 164–446)
PMV BLD AUTO: 10.8 FL (ref 9–12.9)
POTASSIUM SERPL-SCNC: 3.3 MMOL/L (ref 3.6–5.5)
PROCALCITONIN SERPL-MCNC: <0.05 NG/ML
RBC # BLD AUTO: 4.2 M/UL (ref 4.2–5.4)
SODIUM SERPL-SCNC: 137 MMOL/L (ref 135–145)
WBC # BLD AUTO: 19.9 K/UL (ref 4.8–10.8)

## 2018-05-29 PROCEDURE — A9270 NON-COVERED ITEM OR SERVICE: HCPCS | Performed by: INTERNAL MEDICINE

## 2018-05-29 PROCEDURE — A9270 NON-COVERED ITEM OR SERVICE: HCPCS | Performed by: HOSPITALIST

## 2018-05-29 PROCEDURE — 80048 BASIC METABOLIC PNL TOTAL CA: CPT

## 2018-05-29 PROCEDURE — 700102 HCHG RX REV CODE 250 W/ 637 OVERRIDE(OP): Performed by: INTERNAL MEDICINE

## 2018-05-29 PROCEDURE — 770022 HCHG ROOM/CARE - ICU (200)

## 2018-05-29 PROCEDURE — 93306 TTE W/DOPPLER COMPLETE: CPT | Mod: 26 | Performed by: INTERNAL MEDICINE

## 2018-05-29 PROCEDURE — 85025 COMPLETE CBC W/AUTO DIFF WBC: CPT

## 2018-05-29 PROCEDURE — 700102 HCHG RX REV CODE 250 W/ 637 OVERRIDE(OP): Performed by: HOSPITALIST

## 2018-05-29 PROCEDURE — 700105 HCHG RX REV CODE 258: Performed by: INTERNAL MEDICINE

## 2018-05-29 PROCEDURE — 83036 HEMOGLOBIN GLYCOSYLATED A1C: CPT

## 2018-05-29 PROCEDURE — 700101 HCHG RX REV CODE 250: Performed by: INTERNAL MEDICINE

## 2018-05-29 PROCEDURE — 99233 SBSQ HOSP IP/OBS HIGH 50: CPT | Performed by: HOSPITALIST

## 2018-05-29 PROCEDURE — 93306 TTE W/DOPPLER COMPLETE: CPT

## 2018-05-29 PROCEDURE — 84145 PROCALCITONIN (PCT): CPT

## 2018-05-29 PROCEDURE — 70498 CT ANGIOGRAPHY NECK: CPT

## 2018-05-29 PROCEDURE — 70496 CT ANGIOGRAPHY HEAD: CPT

## 2018-05-29 PROCEDURE — 700117 HCHG RX CONTRAST REV CODE 255: Performed by: HOSPITALIST

## 2018-05-29 RX ORDER — ONDANSETRON 2 MG/ML
4 INJECTION INTRAMUSCULAR; INTRAVENOUS EVERY 4 HOURS PRN
Status: DISCONTINUED | OUTPATIENT
Start: 2018-05-29 | End: 2018-06-04 | Stop reason: HOSPADM

## 2018-05-29 RX ORDER — ASPIRIN 325 MG
325 TABLET ORAL DAILY
Status: DISCONTINUED | OUTPATIENT
Start: 2018-05-29 | End: 2018-06-04 | Stop reason: HOSPADM

## 2018-05-29 RX ORDER — HYDROCODONE BITARTRATE AND ACETAMINOPHEN 5; 325 MG/1; MG/1
1-2 TABLET ORAL EVERY 6 HOURS PRN
Status: DISCONTINUED | OUTPATIENT
Start: 2018-05-29 | End: 2018-06-04 | Stop reason: HOSPADM

## 2018-05-29 RX ORDER — ATORVASTATIN CALCIUM 80 MG/1
80 TABLET, FILM COATED ORAL
Status: DISCONTINUED | OUTPATIENT
Start: 2018-05-29 | End: 2018-06-04 | Stop reason: HOSPADM

## 2018-05-29 RX ORDER — SODIUM CHLORIDE AND POTASSIUM CHLORIDE 150; 900 MG/100ML; MG/100ML
INJECTION, SOLUTION INTRAVENOUS CONTINUOUS
Status: DISCONTINUED | OUTPATIENT
Start: 2018-05-29 | End: 2018-05-31

## 2018-05-29 RX ORDER — POTASSIUM CHLORIDE 20 MEQ/1
40 TABLET, EXTENDED RELEASE ORAL ONCE
Status: DISPENSED | OUTPATIENT
Start: 2018-05-29 | End: 2018-05-30

## 2018-05-29 RX ADMIN — POTASSIUM CHLORIDE AND SODIUM CHLORIDE: 900; 150 INJECTION, SOLUTION INTRAVENOUS at 13:00

## 2018-05-29 RX ADMIN — IOHEXOL 100 ML: 350 INJECTION, SOLUTION INTRAVENOUS at 11:22

## 2018-05-29 RX ADMIN — NICOTINE 14 MG: 14 PATCH, EXTENDED RELEASE TRANSDERMAL at 05:22

## 2018-05-29 RX ADMIN — ASPIRIN 325 MG: 325 TABLET ORAL at 10:33

## 2018-05-29 RX ADMIN — HYDROCODONE BITARTRATE AND ACETAMINOPHEN 1 TABLET: 5; 325 TABLET ORAL at 20:52

## 2018-05-29 RX ADMIN — HYDROCODONE BITARTRATE AND ACETAMINOPHEN 1 TABLET: 5; 325 TABLET ORAL at 13:16

## 2018-05-29 RX ADMIN — SODIUM CHLORIDE: 9 INJECTION, SOLUTION INTRAVENOUS at 02:57

## 2018-05-29 RX ADMIN — ATORVASTATIN CALCIUM 80 MG: 40 TABLET, FILM COATED ORAL at 20:52

## 2018-05-29 RX ADMIN — ACETAMINOPHEN 650 MG: 325 TABLET, FILM COATED ORAL at 10:32

## 2018-05-29 ASSESSMENT — LIFESTYLE VARIABLES
SUBSTANCE_ABUSE: 0
SUBSTANCE_ABUSE: 1

## 2018-05-29 ASSESSMENT — ENCOUNTER SYMPTOMS
CARDIOVASCULAR NEGATIVE: 1
BLOOD IN STOOL: 0
PALPITATIONS: 0
COUGH: 0
BACK PAIN: 0
SENSORY CHANGE: 0
FEVER: 0
ABDOMINAL PAIN: 0
DIAPHORESIS: 0
SHORTNESS OF BREATH: 0
VOMITING: 1
NAUSEA: 1
EYE PAIN: 0
CONSTIPATION: 0
CHILLS: 0
FOCAL WEAKNESS: 0
DEPRESSION: 0
SEIZURES: 0
EYES NEGATIVE: 1
NERVOUS/ANXIOUS: 1
MUSCULOSKELETAL NEGATIVE: 1
HEMOPTYSIS: 0
DIARRHEA: 0
HALLUCINATIONS: 0
STRIDOR: 0
RESPIRATORY NEGATIVE: 1
SPEECH CHANGE: 0
ORTHOPNEA: 0
HEADACHES: 1
SINUS PAIN: 0
CLAUDICATION: 0
MYALGIAS: 0
BLURRED VISION: 0
DOUBLE VISION: 0
POLYDIPSIA: 0
HEARTBURN: 0
PHOTOPHOBIA: 0
NECK PAIN: 0
NERVOUS/ANXIOUS: 0
DIZZINESS: 1
WEAKNESS: 1
SPUTUM PRODUCTION: 0
BRUISES/BLEEDS EASILY: 0
COUGH: 1

## 2018-05-29 ASSESSMENT — PATIENT HEALTH QUESTIONNAIRE - PHQ9
1. LITTLE INTEREST OR PLEASURE IN DOING THINGS: NOT AT ALL
SUM OF ALL RESPONSES TO PHQ9 QUESTIONS 1 AND 2: 0
2. FEELING DOWN, DEPRESSED, IRRITABLE, OR HOPELESS: NOT AT ALL

## 2018-05-29 ASSESSMENT — PAIN SCALES - GENERAL
PAINLEVEL_OUTOF10: 2
PAINLEVEL_OUTOF10: 10
PAINLEVEL_OUTOF10: 6
PAINLEVEL_OUTOF10: 4
PAINLEVEL_OUTOF10: 7
PAINLEVEL_OUTOF10: 3
PAINLEVEL_OUTOF10: 5
PAINLEVEL_OUTOF10: 4

## 2018-05-29 NOTE — PROGRESS NOTES
Patient transferred to [place] [id number] with 2 ICU RNs at bedside at 1033. Patient comfortable in bed and medicated for pain prior to transfer. Report given to Delio MAYORGA and Ty RN without event. Patient family notified.

## 2018-05-29 NOTE — PROGRESS NOTES
Dr. Villanueva consulted neuro interventional service for possible intervention of her left-sided posterior inferior cerebellar artery infarct seen on recent MRI dated 5/28/2018. The CT angiogram demonstrates nondominant left vertebral artery with occluded PICA at its proximal portion. There is no radiological evidence of flow-limiting dissection. Therefore there is no indication for any neuro interventional intervention. Even if there is a dissection, medical management is the first line treatment.

## 2018-05-29 NOTE — DIETARY
NUTRITION SERVICES: BMI - Pt with BMI >40 (=41.7). Weight loss counseling not appropriate in acute care setting. RECOMMEND - Referral to outpatient nutrition services for weight management after D/C.

## 2018-05-29 NOTE — PROGRESS NOTES
Received report from orthopedic RN at 1020 at bedside. ECG placed with continous monitoring in place. Assumed pt care.This RN takes pt to CT during transport to [facility] on a hosital bed with additional RN at bedside. Discussed call light/phone system, and POC. Pt is aao x 4 and verbalizes understanding. Pt is fatigued and has slow, clear speech. Pt moves all extremities x 4. Pt has greater weakness and sensory deficits to R side. Pt is able to swallow with no problems. Q2 neuro checks in place. Neuro MD performs bedside eval shortly after arrival to the floor. 2 RN skin check performed. Skin intact, no wounds detected. Pt mobility assessed. Pt has weakness to BLE. Pt requires full assist during transfer to hospital bed. Pt is able to get up to commode with a one assist upon arrival to floor. Bed alarm on. Fall precautions in place. Bed is locked and in low position, call light within reach. Treaded slippers in place. Pt needs met at this time. Hourly rounding in place.

## 2018-05-29 NOTE — CONSULTS
PULMONARY AND CRITICAL CARE MEDICINE CONSULTATION    Date of Consultation:  5/29/2018    Requesting Physician:  Sergio Villanueva MD    Consulting Physician:  Keyon Gbibs MD    Reason for Consultation:  Critical care management in lady with acute ischemic stroke    Chief Complaint:  Nausea, vomiting, dizziness    History of Present Illness:    I was kindly asked to see and evaluate Bria Martinez, a 42 y.o. female for evaluation and management of the above problem.    This lady presented to [facility] on or about 5/28/2018 with 2 hours of nausea, vomiting and dizziness.  An MRI of the brain has revealed a large left cerebellar infarction in the left PICA territory.  CT angiography did not reveal evidence of a vertebral artery dissection.  Dr. Rosario reviewed the case and there is no indication for IR intervention.  This lady has been transferred to the ICU for very close neurological monitoring.  She is at risk for developing malignant edema in this large territory ischemic stroke in the posterior fossa.  She continues to have some dizziness, but her nausea and vomiting have improved somewhat.  She denies any photophobia, diplopia or blurred vision.  She denies any numbness or tingling or focal weakness.    She denies sputum production or dyspnea.  She has no angina, palpitations or syncope.    Medications Prior to Admission:    No current facility-administered medications on file prior to encounter.      Current Outpatient Prescriptions on File Prior to Encounter   Medication Sig Dispense Refill   • potassium chloride SA (K-DUR) 20 MEQ Tab CR Take 0.5 Tabs by mouth every day. 30 Tab 1   • furosemide (LASIX) 20 MG Tab Take 20 mg by mouth as needed. Indications: Edema         Current Medications:      Current Facility-Administered Medications:   •  atorvastatin (LIPITOR) tablet 80 mg, 80 mg, Oral, QHS, Sergio Villanueva M.D.  •  Pharmacy Consult Request, , Other, PRN, Sergio Villanueva M.D.  •   potassium chloride SA (Kdur) tablet 40 mEq, 40 mEq, Oral, Once, Sergio Villanueva M.D., Stopped at 05/29/18 0930  •  aspirin (ASA) tablet 325 mg, 325 mg, Oral, DAILY, Sergio Villanueva M.D., 325 mg at 05/29/18 1033  •  0.9 % NaCl with KCl 20 mEq infusion, , Intravenous, Continuous, Keyon Gibbs M.D.  •  ondansetron (ZOFRAN) syringe/vial injection 4 mg, 4 mg, Intravenous, Q4HRS PRN, Keyon Gibbs M.D.  •  HYDROcodone-acetaminophen (NORCO) 5-325 MG per tablet 1-2 Tab, 1-2 Tab, Oral, Q6HRS PRN, Keyon Gibbs M.D.  •  senna-docusate (PERICOLACE or SENOKOT S) 8.6-50 MG per tablet 2 Tab, 2 Tab, Oral, BID **AND** polyethylene glycol/lytes (MIRALAX) PACKET 1 Packet, 1 Packet, Oral, QDAY PRN **AND** magnesium hydroxide (MILK OF MAGNESIA) suspension 30 mL, 30 mL, Oral, QDAY PRN **AND** bisacodyl (DULCOLAX) suppository 10 mg, 10 mg, Rectal, QDAY PRN, Dmitry Pond M.D.  •  Respiratory Care per Protocol, , Nebulization, Continuous RT, Dmitry Pond M.D.  •  ondansetron (ZOFRAN ODT) dispertab 4 mg, 4 mg, Oral, Q4HRS PRN, Dmitry Pond M.D.  •  promethazine (PHENERGAN) tablet 12.5-25 mg, 12.5-25 mg, Oral, Q4HRS PRN, Dmitry Pond M.D.  •  promethazine (PHENERGAN) suppository 12.5-25 mg, 12.5-25 mg, Rectal, Q4HRS PRN, Dmitry Pond M.D.  •  prochlorperazine (COMPAZINE) injection 5-10 mg, 5-10 mg, Intravenous, Q4HRS PRN, Dmitry Pond M.D.  •  nicotine (NICODERM) 14 MG/24HR 14 mg, 14 mg, Transdermal, Daily-0600, 14 mg at 05/29/18 0522 **AND** Nicotine Replacement Patient Education Materials, , , Once **AND** nicotine polacrilex (NICORETTE) 2 MG piece 2 mg, 2 mg, Oral, Q HOUR PRN, Dmitry Pond M.D.  •  acetaminophen (TYLENOL) tablet 650 mg, 650 mg, Oral, Q6HRS PRN, Dmitry Pond M.D., 650 mg at 05/29/18 1032  •  meclizine (ANTIVERT) tablet 25 mg, 25 mg, Oral, TID PRN, Dmitry Pond M.D.    Allergies:    Bee; Augmentin; Latex; Neosporin lip health; Other misc; Sulfa drugs; and Tape    Past Surgical  History:    Past Surgical History:   Procedure Laterality Date   • VAGINAL HYSTERECTOMY SCOPE TOTAL  8/23/2017    Procedure: VAGINAL HYSTERECTOMY SCOPE TOTAL ;  Surgeon: Davin Liriano M.D.;  Location: SURGERY [facility];  Service:    • SALPINGECTOMY Bilateral 8/23/2017    Procedure: SALPINGECTOMY;  Surgeon: Davin Liriano M.D.;  Location: SURGERY [facility];  Service:    • CHOLECYSTECTOMY  2009   • APPENDECTOMY     • BREAST BIOPSY Left    • TONSILLECTOMY         Past Medical History:    Past Medical History:   Diagnosis Date   • Acid reflux disease 8/8/2012   • Anesthesia 8-9-2017    uncontrollable shaking   • Breath shortness 8-9-2017    no oxygen usage   • CHEST PAIN 8/8/2012   • Dental disorder 8-9-2017    upper partial   • Gynecological disorder    • Heart burn    • Hypercholesterolemia 8/8/2012   • Psychiatric problem 8-9-2017    anxiety; and panic attacks   • Urinary bladder disorder 8-9-2017    leakage with sneezing/movement       Social History:    Social History     Social History   • Marital status: Single     Spouse name: N/A   • Number of children: N/A   • Years of education: N/A     Occupational History   • Not on file.     Social History Main Topics   • Smoking status: Current Every Day Smoker     Packs/day: 1.00     Years: 20.00     Types: Cigarettes   • Smokeless tobacco: Never Used   • Alcohol use Yes      Comment: RARELY   • Drug use: No   • Sexual activity: Not on file     Other Topics Concern   • Not on file     Social History Narrative   • No narrative on file       Family History:    History reviewed. No pertinent family history.    Review of System:    Review of Systems   Constitutional: Negative for chills, diaphoresis and fever.   HENT: Negative for congestion, ear discharge, ear pain, nosebleeds and sinus pain.    Eyes: Negative for blurred vision, double vision, photophobia and pain.   Respiratory: Positive for cough (Dry). Negative for hemoptysis, sputum production, shortness  "of breath and stridor.    Cardiovascular: Positive for leg swelling. Negative for chest pain, palpitations, orthopnea and claudication.   Gastrointestinal: Positive for nausea and vomiting. Negative for abdominal pain, blood in stool, constipation and diarrhea.   Genitourinary: Negative for dysuria, frequency, hematuria and urgency.   Musculoskeletal: Negative for back pain, joint pain, myalgias and neck pain.   Skin: Negative for itching and rash.   Neurological: Positive for dizziness and headaches. Negative for sensory change, speech change and seizures.   Endo/Heme/Allergies: Negative for environmental allergies and polydipsia. Does not bruise/bleed easily.   Psychiatric/Behavioral: Negative for depression, hallucinations, substance abuse and suicidal ideas. The patient is not nervous/anxious.        Physical Examination:    /70   Pulse 93   Temp 36.4 °C (97.6 °F)   Resp 17   Ht 1.575 m (5' 2\")   Wt 103.4 kg (227 lb 15.3 oz)   LMP 08/01/2017 Comment: Partial hystorectomy   SpO2 94%   BMI 41.69 kg/m²   Physical Exam   Constitutional: She is oriented to person, place, and time.   HENT:   Head: Normocephalic and atraumatic.   Right Ear: External ear normal.   Left Ear: External ear normal.   Nose: Nose normal.   Mouth/Throat: Oropharynx is clear and moist.   Eyes: Conjunctivae are normal. Pupils are equal, round, and reactive to light. Right eye exhibits no discharge. Left eye exhibits no discharge. No scleral icterus.   Neck: Normal range of motion. Neck supple. No JVD present. No tracheal deviation present.   Cardiovascular: Intact distal pulses.  Exam reveals no gallop and no friction rub.    No murmur heard.  Sinus rhythm   Pulmonary/Chest: No stridor. She has no wheezes. She has no rales.   Abdominal: Soft. Bowel sounds are normal. She exhibits no distension. There is no tenderness. There is no rebound and no guarding.   Musculoskeletal: Normal range of motion. She exhibits no edema, tenderness " or deformity.   No clubbing or cyanosis   Neurological: She is alert and oriented to person, place, and time. No cranial nerve deficit. GCS score is 15.   No focal weakness   Skin: Skin is warm and dry. No rash noted. She is not diaphoretic. No erythema. No pallor.       Laboratory Data:          Invalid input(s): [id number]  Recent Labs      05/28/18   0350  05/29/18   0120   WBC  15.7*  19.9*   RBC  4.94  4.20   HEMOGLOBIN  15.5  13.0   HEMATOCRIT  44.7  38.4   MCV  90.5  91.4   MCH  31.4  31.0   MCHC  34.7  33.9   RDW  40.3  41.2   PLATELETCT  202  205   MPV  10.6  10.8     Recent Labs      05/28/18   0350  05/29/18   0120   SODIUM  138  137   POTASSIUM  4.5  3.3*   CHLORIDE  109  110   CO2  14*  22   GLUCOSE  119*  96   BUN  22  14   CREATININE  0.67  0.64   CALCIUM  8.8  8.4*         Recent Labs      05/28/18   0350   TROPONINI  <0.01           Imaging:    I personally viewed the CXR and CT scan images as well as reviewed the radiology interpretation reports.    CT-CTA NECK WITH & W/O-POST PROCESSING   Final Result   Addendum 1 of 1   Addendum: There is a left PICA occlusion consistent with the left    cerebellar infarct.      Final      1.  There is no evidence of vertebral artery dissection.   2.  There is normal anatomic variant left vertebral artery originating from the aortic arch.   3.  There is estimated less than 50% stenosis at the left vertebral artery origin at the arch likely due to atherosclerosis.   4.  Carotid arteries are unremarkable.      CT-CTA HEAD WITH & W/O-POST PROCESS   Final Result   Addendum 1 of 1   Addendum: The left PICA is occluded corresponding to the area of evolving    infarction.      Final      No thrombosis is seen within the [place].      1.7 mm outpouching involving the supraclinoid ICA on the left likely represents an infundibulum. Tiny Aneurysm not excluded.      Dominant right vertebral artery.      Evolving left cerebellar infarction.         MR-BRAIN-W/O    Final Result      1.  Acute left cerebellar infarct in the PICA territory.   2.  Suspicion for left vertebral artery dissection. CTA of the head and neck may be of interest as there is a possible dissection flap.   3.  Subcentimeter focus of nonspecific FLAIR hyperintensity at the right parietal high convexity. Possible old infarct or other remote insult. No restricted diffusion to indicate acute cortical infarct.   4.  Nonspecific punctate focus of FLAIR hyperintensity in the left posterior frontal subcortical white matter. Most likely small vessel ischemic change versus demyelination or gliosis.   5.  No evidence of acute hemorrhage or mass lesion.      CT-ABDOMEN-PELVIS WITH   Final Result         1.  No acute abnormality.   2.  5 mm low-density lesion in the dome of the liver, stable since prior study, could represent small cyst or hemangioma, otherwise indeterminate and too small to definitively characterize.   3.  Bilateral ovarian cysts, greater on the right, decreased in size on the right compared to prior study.   4.  Atherosclerosis      DX-CHEST-PORTABLE (1 VIEW)   Final Result         1.  No focal infiltrates.   2.  Perihilar interstitial prominence and bronchial wall cuffing, appearance suggests changes of underlying bronchial inflammation, consider bronchitis.      Echocardiogram Comp W/O cont    (Results Pending)       Assessment and Plan:    Acute ischemic left cerebellar infarction   -No evidence of her vertebral artery dissection on CT angiography   -Aspirin, high intensity statin   -Frequent neurologic checks   -High risk of developing malignant edema in the posterior fossa due to size of infarction   -Check echocardiogram   -Check lipid panel    Hypokalemia   -Replete potassium    Dyslipidemia   -Continue statin    History of GERD      I have assessed and reassessed her neurologic status, hemodynamics and cardiovascular status.  She is at increased risk for worsening CNS system dysfunction.   She is critically ill in ICU.    High risk of deterioration and worsening vital organ dysfunction and death without the above critical care interventions.    Thank you for allowing me to participate in the care of this lady.  I will continue to follow her with great interest.    Critical Care Time:  33 minutes  [id number]  No time overlap  Time excludes procedures  Discussed with RN, RT, Team, Clinical pharmacist, Hospitalist    Keyon Gibbs MD  Pulmonary and Critical Care Medicine

## 2018-05-29 NOTE — CONSULTS
CC:  NAUSEA VOMIT HEADACHE    Date of Admission: 5/28/2018    Today's Date: 05/29/18    Consulting Physician: Sergio Villanueva M.D.      HPI:    Bria Martinez is a 42 y.o. female noted sensation of spinning, nausea and vomiting, headache.  Onset PAST MIDNIGHT on 5/28 was bent over reaching triggered, nothing improved.  Flashes in vision day before.  No other complaints. Notes tues had chiro manipulation HVLA.       ROS:   Constitutional: No fevers or chills.  Eyes: No blurry vision or eye pain.  ENT: No dysphagia or hearing loss.  Respiratory: No cough or shortness of breath.  Cardiovascular: No chest pain or palpitations.  GI: No nausea, vomiting, or diarrhea.  : No urinary incontinence or dysuria.  Musculoskeletal: No joint swelling or arthralgias.  Skin: No skin rashes.  Neuro: See HPI.  Endocrine: No heat or cold intolerance. No polydipsia or polyuria.  Psych: No depression or anxiety.  Heme/Lymph: No easy bruising or swollen lymph nodes.  All other systems were reviewed and were negative.       Past Medical History:   Past Medical History:   Diagnosis Date   • Anesthesia 8-9-2017    uncontrollable shaking   • Dental disorder 8-9-2017    upper partial   • Breath shortness 8-9-2017    no oxygen usage   • Urinary bladder disorder 8-9-2017    leakage with sneezing/movement   • Psychiatric problem 8-9-2017    anxiety; and panic attacks   • CHEST PAIN 8/8/2012   • Hypercholesterolemia 8/8/2012   • Acid reflux disease 8/8/2012   • Gynecological disorder    • Heart burn        Past Surgical History:   Past Surgical History:   Procedure Laterality Date   • VAGINAL HYSTERECTOMY SCOPE TOTAL  8/23/2017    Procedure: VAGINAL HYSTERECTOMY SCOPE TOTAL ;  Surgeon: Davin Liriano M.D.;  Location: SURGERY [facility];  Service:    • SALPINGECTOMY Bilateral 8/23/2017    Procedure: SALPINGECTOMY;  Surgeon: Davin Liriano M.D.;  Location: SURGERY [facility];  Service:    • CHOLECYSTECTOMY  2009   •  "APPENDECTOMY     • BREAST BIOPSY Left    • TONSILLECTOMY         Social History:   Social History     Social History   • Marital status: Single     Spouse name: N/A   • Number of children: N/A   • Years of education: N/A     Occupational History   • Not on file.     Social History Main Topics   • Smoking status: Current Every Day Smoker     Packs/day: 1.00     Years: 20.00     Types: Cigarettes   • Smokeless tobacco: Never Used   • Alcohol use Yes      Comment: RARELY   • Drug use: No   • Sexual activity: Not on file     Other Topics Concern   • Not on file     Social History Narrative   • No narrative on file       Family History: History reviewed. No pertinent family history.    Allergies:   Allergies   Allergen Reactions   • Bee Anaphylaxis   • Augmentin Rash     Pt states \"I get a bad rash\".     • Latex Rash     Pt states \"I get a bad rash\".   • Neosporin Lip Health Hives     Blisters and swelling at site   • Other Misc Rash     Nail acrylic- Pt states \"I get a bad rash\".   • Sulfa Drugs Hives, Itching and Swelling   • Tape Rash     Pt states \"I get a bad rash\".  Paper tape ok         Current Facility-Administered Medications:   •  atorvastatin (LIPITOR) tablet 80 mg, 80 mg, Oral, QHS, Sergio Villanueva M.D.  •  Pharmacy Consult Request, , Other, PRN, Sergio Villanueva M.D.  •  potassium chloride SA (Kdur) tablet 40 mEq, 40 mEq, Oral, Once, Sergio Villanueva M.D.  •  aspirin (ASA) tablet 325 mg, 325 mg, Oral, DAILY, Sergio Villanueva M.D.  •  senna-docusate (PERICOLACE or SENOKOT S) 8.6-50 MG per tablet 2 Tab, 2 Tab, Oral, BID **AND** polyethylene glycol/lytes (MIRALAX) PACKET 1 Packet, 1 Packet, Oral, QDAY PRN **AND** magnesium hydroxide (MILK OF MAGNESIA) suspension 30 mL, 30 mL, Oral, QDAY PRN **AND** bisacodyl (DULCOLAX) suppository 10 mg, 10 mg, Rectal, QDAY PRN, Dmitry Pond M.D.  •  Respiratory Care per Protocol, , Nebulization, Continuous RT, Dmitry Pond M.D.  •  NS infusion, , Intravenous, " Continuous, Dmitry Pond M.D., Last Rate: 125 mL/hr at 05/29/18 0257  •  ondansetron (ZOFRAN) syringe/vial injection 4 mg, 4 mg, Intravenous, Q4HRS PRN, Dmitry Pond M.D.  •  ondansetron (ZOFRAN ODT) dispertab 4 mg, 4 mg, Oral, Q4HRS PRN, Dmitry Pond M.D.  •  promethazine (PHENERGAN) tablet 12.5-25 mg, 12.5-25 mg, Oral, Q4HRS PRN, Dmitry Pond M.D.  •  promethazine (PHENERGAN) suppository 12.5-25 mg, 12.5-25 mg, Rectal, Q4HRS PRN, Dmitry Pond M.D.  •  prochlorperazine (COMPAZINE) injection 5-10 mg, 5-10 mg, Intravenous, Q4HRS PRN, Dmitry Pond M.D.  •  nicotine (NICODERM) 14 MG/24HR 14 mg, 14 mg, Transdermal, Daily-0600, 14 mg at 05/29/18 0522 **AND** Nicotine Replacement Patient Education Materials, , , Once **AND** nicotine polacrilex (NICORETTE) 2 MG piece 2 mg, 2 mg, Oral, Q HOUR PRN, Dmitry Pond M.D.  •  acetaminophen (TYLENOL) tablet 650 mg, 650 mg, Oral, Q6HRS PRN, Dmitry Pond M.D.  •  meclizine (ANTIVERT) tablet 25 mg, 25 mg, Oral, TID PRN, Dmitry Pond M.D.      PHYSICAL EXAM    Vitals:    05/28/18 1137 05/28/18 2000 05/29/18 0359 05/29/18 0700   BP:  115/75 117/70 107/70   Pulse: 82 79 78 93   Resp: 18 15 17 17   Temp:  36.8 °C (98.2 °F) 36.4 °C (97.6 °F) 36.4 °C (97.6 °F)   SpO2:  93% 91% 94%   Weight:       Height:           Head/Neck: NCAT no meningismus neg kernig neg brudzinski. No obvious mass or heard bruit. No tender arteries or lost pulses.  No rash of head or neck.  Skin: Warm, dry, intact. No rashes observed head/neck or body  Eyes/Funduscopic: Optic discs flat with no evidence of papilledema or pallor. Normal posterior segments.    Mental Status: Awake, alert, oriented x 3. Names/repeats/fluent/follows commands. No neglect/extinction. Attention and concentration, recent & remote memory, Fund of Knowledge wnl.  Cranial Nerves: CN II-XII intact. PERRL.   No afferent pupillary defect. EOM full. VF full. sym grimace & eye closure. FFA nystagmus, SKEW DEV.     Motor: strength/bulk/tone wnl.   intact and sym, no abn mvmts   Sensory: Intact light touch & sharp ON LEFT, LESS ON RIGHT SUBTLE ARM AND LEG  Coordination: finger to nose & heel to shin intact.   DTR's: intact/sym. no clonus. Toes mute.  Gait/Station: n/a fall concern. AXIAL TITUBATION/ATAXIA TRUNCAL     NIHSS: 1    Labs:  Recent Labs      05/28/18   0350  05/29/18   0120   WBC  15.7*  19.9*   RBC  4.94  4.20   HEMOGLOBIN  15.5  13.0   HEMATOCRIT  44.7  38.4   MCV  90.5  91.4   MCH  31.4  31.0   MCHC  34.7  33.9   RDW  40.3  41.2   PLATELETCT  202  205   MPV  10.6  10.8     Recent Labs      05/28/18   0350  05/29/18   0120   SODIUM  138  137   POTASSIUM  4.5  3.3*   CHLORIDE  109  110   CO2  14*  22   GLUCOSE  119*  96   BUN  22  14   CREATININE  0.67  0.64   CALCIUM  8.8  8.4*             Recent Labs      05/28/18 0350   TROPONINI  <0.01         Recent Labs      05/28/18   0350  05/29/18   0120   SODIUM  138  137   POTASSIUM  4.5  3.3*   CHLORIDE  109  110   CO2  14*  22   GLUCOSE  119*  96   BUN  22  14     Recent Labs      05/28/18   0350  05/29/18   0120   SODIUM  138  137   POTASSIUM  4.5  3.3*   CHLORIDE  109  110   CO2  14*  22   BUN  22  14   CREATININE  0.67  0.64   CALCIUM  8.8  8.4*         Results for orders placed or performed during the hospital encounter of 01/02/17   ECHOCARDIOGRAM COMP W/O CONT   Result Value Ref Range    Eject.Frac. MOD BP 69.77     Eject.Frac. MOD 4C 77.21     Eject.Frac. MOD 2C 64.59     Left Ventrical Ejection Fraction 65               Imaging: neuroimaging reviewed and directly visualized by me  MR-BRAIN-W/O   Final Result      1.  Acute left cerebellar infarct in the PICA territory.   2.  Suspicion for left vertebral artery dissection. CTA of the head and neck may be of interest as there is a possible dissection flap.   3.  Subcentimeter focus of nonspecific FLAIR hyperintensity at the right parietal high convexity. Possible old infarct or other remote insult. No restricted diffusion to indicate acute  cortical infarct.   4.  Nonspecific punctate focus of FLAIR hyperintensity in the left posterior frontal subcortical white matter. Most likely small vessel ischemic change versus demyelination or gliosis.   5.  No evidence of acute hemorrhage or mass lesion.      CT-ABDOMEN-PELVIS WITH   Final Result         1.  No acute abnormality.   2.  5 mm low-density lesion in the dome of the liver, stable since prior study, could represent small cyst or hemangioma, otherwise indeterminate and too small to definitively characterize.   3.  Bilateral ovarian cysts, greater on the right, decreased in size on the right compared to prior study.   4.  Atherosclerosis      DX-CHEST-PORTABLE (1 VIEW)   Final Result         1.  No focal infiltrates.   2.  Perihilar interstitial prominence and bronchial wall cuffing, appearance suggests changes of underlying bronchial inflammation, consider bronchitis.      CT-CTA NECK WITH & W/O-POST PROCESSING    (Results Pending)   Echocardiogram Comp W/O cont    (Results Pending)   CT-CTA HEAD WITH & W/O-POST PROCESS    (Results Pending)       Assessment/Plan:    SUBACUTE(DAY 3 LIKELY) L PICA ACUTE ISCHEMIC INFARCT, LARGE VOLUME, 2' TO SUSPECTED CARDIOEMBOLUS(VALSALVA PRIOR BENT OVER) VS L PROX VERT ATHEROEMBOLUS.     SUSPECT SMALL LEFT THALAMIC INFARCT CAUSING RIGHT SENSORY LOSS, MISSED ON MRI CUTS.    INCIDENTAL RARE SMALL BILATERAL JUXTACORTICAL T2 HYPERINTENSITIES NONSPECIFIC NONACUTE, LIKELY ISCHEMIC/MICROVASCULAR.    - COMPLETE STROKE LABS, PMR CONSULT  - CARD CONSULT FOR DANICA WITH CARDIOEMBOLUS CONCERN  - ESTER EVAL:  SMALL ANEU SUPRACLINOID ICA LEFT? INCIDENTAL. LEFT VERT ORIGIN STENOSIS <50% MAY BE SOURCE OF PICA STROKE  - ASA 81 MG, NO ANTICOAGULATION FOR 2WKS WITH LARGE CEREBELLAR STROKE HIGH ICH RISK WOULD NEED RPT NEUROIMAGING PRIOR TO START AND NEURO OK  - MAX DOSE LIPITOR OR CRESTOR  - ICU FOR SUBACUTE PERIOD(OUT TO 7D POST ONSET SYMPTOMS) CONCERN FOR MALIGNANT EDEMA IN THIS LARGE  TERRITORY STROKE IN POST FOSSA  - NSG EVAL FOR SIGNS OF INCREASED ICP IN COMING DAYS  - CAN TRY DECADRON FOR HEADACHE IF DOESN'T REMIT, MENIGEAL INFARCT IN SETTING OF POST CIRC STROKE CAN BE RECALCITRANT  - NO FURTHER NEURO WORKUP AS INPT FOR NOW SIGN OFF    Vicente Turner M.D.  , Neurohospitalist & Stroke  Clinical Professor, [employer]  Diplomate, Neurology & Neuroimaging

## 2018-05-30 PROBLEM — Q21.12 PFO (PATENT FORAMEN OVALE): Status: ACTIVE | Noted: 2018-05-30

## 2018-05-30 LAB
ANION GAP SERPL CALC-SCNC: 6 MMOL/L (ref 0–11.9)
BASOPHILS # BLD AUTO: 0.4 % (ref 0–1.8)
BASOPHILS # BLD: 0.04 K/UL (ref 0–0.12)
BUN SERPL-MCNC: 8 MG/DL (ref 8–22)
CALCIUM SERPL-MCNC: 8.2 MG/DL (ref 8.5–10.5)
CHLORIDE SERPL-SCNC: 109 MMOL/L (ref 96–112)
CHOLEST SERPL-MCNC: 213 MG/DL (ref 100–199)
CO2 SERPL-SCNC: 21 MMOL/L (ref 20–33)
CREAT SERPL-MCNC: 0.62 MG/DL (ref 0.5–1.4)
EOSINOPHIL # BLD AUTO: 0.25 K/UL (ref 0–0.51)
EOSINOPHIL NFR BLD: 2.4 % (ref 0–6.9)
ERYTHROCYTE [DISTWIDTH] IN BLOOD BY AUTOMATED COUNT: 39.9 FL (ref 35.9–50)
GLUCOSE SERPL-MCNC: 90 MG/DL (ref 65–99)
HCT VFR BLD AUTO: 39.6 % (ref 37–47)
HDLC SERPL-MCNC: 33 MG/DL
HGB BLD-MCNC: 13.7 G/DL (ref 12–16)
IMM GRANULOCYTES # BLD AUTO: 0.05 K/UL (ref 0–0.11)
IMM GRANULOCYTES NFR BLD AUTO: 0.5 % (ref 0–0.9)
LDLC SERPL CALC-MCNC: 152 MG/DL
LYMPHOCYTES # BLD AUTO: 4.44 K/UL (ref 1–4.8)
LYMPHOCYTES NFR BLD: 43 % (ref 22–41)
MAGNESIUM SERPL-MCNC: 1.7 MG/DL (ref 1.5–2.5)
MCH RBC QN AUTO: 31 PG (ref 27–33)
MCHC RBC AUTO-ENTMCNC: 34.6 G/DL (ref 33.6–35)
MCV RBC AUTO: 89.6 FL (ref 81.4–97.8)
MONOCYTES # BLD AUTO: 0.84 K/UL (ref 0–0.85)
MONOCYTES NFR BLD AUTO: 8.1 % (ref 0–13.4)
NEUTROPHILS # BLD AUTO: 4.71 K/UL (ref 2–7.15)
NEUTROPHILS NFR BLD: 45.6 % (ref 44–72)
NRBC # BLD AUTO: 0 K/UL
NRBC BLD-RTO: 0 /100 WBC
PLATELET # BLD AUTO: 193 K/UL (ref 164–446)
PMV BLD AUTO: 10.5 FL (ref 9–12.9)
POTASSIUM SERPL-SCNC: 3.6 MMOL/L (ref 3.6–5.5)
RBC # BLD AUTO: 4.42 M/UL (ref 4.2–5.4)
SODIUM SERPL-SCNC: 136 MMOL/L (ref 135–145)
TRIGL SERPL-MCNC: 139 MG/DL (ref 0–149)
WBC # BLD AUTO: 10.3 K/UL (ref 4.8–10.8)

## 2018-05-30 PROCEDURE — A9270 NON-COVERED ITEM OR SERVICE: HCPCS | Performed by: INTERNAL MEDICINE

## 2018-05-30 PROCEDURE — 85025 COMPLETE CBC W/AUTO DIFF WBC: CPT

## 2018-05-30 PROCEDURE — 97165 OT EVAL LOW COMPLEX 30 MIN: CPT

## 2018-05-30 PROCEDURE — A9270 NON-COVERED ITEM OR SERVICE: HCPCS | Performed by: HOSPITALIST

## 2018-05-30 PROCEDURE — 80048 BASIC METABOLIC PNL TOTAL CA: CPT

## 2018-05-30 PROCEDURE — 700111 HCHG RX REV CODE 636 W/ 250 OVERRIDE (IP): Performed by: INTERNAL MEDICINE

## 2018-05-30 PROCEDURE — G8987 SELF CARE CURRENT STATUS: HCPCS | Mod: CJ

## 2018-05-30 PROCEDURE — G8979 MOBILITY GOAL STATUS: HCPCS | Mod: CI

## 2018-05-30 PROCEDURE — 97162 PT EVAL MOD COMPLEX 30 MIN: CPT

## 2018-05-30 PROCEDURE — 700102 HCHG RX REV CODE 250 W/ 637 OVERRIDE(OP): Performed by: INTERNAL MEDICINE

## 2018-05-30 PROCEDURE — 770022 HCHG ROOM/CARE - ICU (200)

## 2018-05-30 PROCEDURE — 700101 HCHG RX REV CODE 250: Performed by: INTERNAL MEDICINE

## 2018-05-30 PROCEDURE — 80061 LIPID PANEL: CPT

## 2018-05-30 PROCEDURE — 99233 SBSQ HOSP IP/OBS HIGH 50: CPT | Performed by: HOSPITALIST

## 2018-05-30 PROCEDURE — G8988 SELF CARE GOAL STATUS: HCPCS | Mod: CI

## 2018-05-30 PROCEDURE — G8978 MOBILITY CURRENT STATUS: HCPCS | Mod: CL

## 2018-05-30 PROCEDURE — 83735 ASSAY OF MAGNESIUM: CPT

## 2018-05-30 PROCEDURE — 99233 SBSQ HOSP IP/OBS HIGH 50: CPT | Performed by: INTERNAL MEDICINE

## 2018-05-30 PROCEDURE — 93970 EXTREMITY STUDY: CPT

## 2018-05-30 PROCEDURE — 700102 HCHG RX REV CODE 250 W/ 637 OVERRIDE(OP): Performed by: HOSPITALIST

## 2018-05-30 RX ORDER — MAGNESIUM SULFATE HEPTAHYDRATE 40 MG/ML
2 INJECTION, SOLUTION INTRAVENOUS ONCE
Status: COMPLETED | OUTPATIENT
Start: 2018-05-30 | End: 2018-05-30

## 2018-05-30 RX ADMIN — HYDROCODONE BITARTRATE AND ACETAMINOPHEN 2 TABLET: 5; 325 TABLET ORAL at 09:59

## 2018-05-30 RX ADMIN — NICOTINE 14 MG: 14 PATCH, EXTENDED RELEASE TRANSDERMAL at 05:12

## 2018-05-30 RX ADMIN — HYDROCODONE BITARTRATE AND ACETAMINOPHEN 1 TABLET: 5; 325 TABLET ORAL at 17:25

## 2018-05-30 RX ADMIN — POTASSIUM CHLORIDE AND SODIUM CHLORIDE: 900; 150 INJECTION, SOLUTION INTRAVENOUS at 16:00

## 2018-05-30 RX ADMIN — ACETAMINOPHEN 650 MG: 325 TABLET, FILM COATED ORAL at 00:53

## 2018-05-30 RX ADMIN — POTASSIUM CHLORIDE AND SODIUM CHLORIDE: 900; 150 INJECTION, SOLUTION INTRAVENOUS at 02:18

## 2018-05-30 RX ADMIN — ATORVASTATIN CALCIUM 80 MG: 40 TABLET, FILM COATED ORAL at 20:12

## 2018-05-30 RX ADMIN — STANDARDIZED SENNA CONCENTRATE AND DOCUSATE SODIUM 2 TABLET: 8.6; 5 TABLET, FILM COATED ORAL at 09:57

## 2018-05-30 RX ADMIN — ASPIRIN 325 MG: 325 TABLET ORAL at 09:57

## 2018-05-30 RX ADMIN — ACETAMINOPHEN 650 MG: 325 TABLET, FILM COATED ORAL at 22:41

## 2018-05-30 RX ADMIN — ONDANSETRON 4 MG: 2 INJECTION INTRAMUSCULAR; INTRAVENOUS at 09:59

## 2018-05-30 RX ADMIN — MAGNESIUM SULFATE IN WATER 2 G: 40 INJECTION, SOLUTION INTRAVENOUS at 09:56

## 2018-05-30 ASSESSMENT — ENCOUNTER SYMPTOMS
COUGH: 0
DIARRHEA: 0
SHORTNESS OF BREATH: 0
FEVER: 0
CONSTIPATION: 0
TINGLING: 0
NAUSEA: 1
SINUS PAIN: 0
ABDOMINAL PAIN: 0
CHILLS: 0
BLURRED VISION: 0
CLAUDICATION: 0
PHOTOPHOBIA: 0
DEPRESSION: 0
MYALGIAS: 0
STRIDOR: 0
LOSS OF CONSCIOUSNESS: 0
DIAPHORESIS: 0
BRUISES/BLEEDS EASILY: 0
POLYDIPSIA: 0
PALPITATIONS: 0
NECK PAIN: 0
TREMORS: 0
HEADACHES: 1
SPEECH CHANGE: 0
EYE PAIN: 0
DOUBLE VISION: 0
SEIZURES: 0
ORTHOPNEA: 0
DIZZINESS: 1
SPUTUM PRODUCTION: 0
BACK PAIN: 0
VOMITING: 0
NERVOUS/ANXIOUS: 0
HEMOPTYSIS: 0

## 2018-05-30 ASSESSMENT — COGNITIVE AND FUNCTIONAL STATUS - GENERAL
DAILY ACTIVITIY SCORE: 19
WALKING IN HOSPITAL ROOM: A LITTLE
TURNING FROM BACK TO SIDE WHILE IN FLAT BAD: UNABLE
STANDING UP FROM CHAIR USING ARMS: A LITTLE
MOVING TO AND FROM BED TO CHAIR: UNABLE
TOILETING: A LITTLE
HELP NEEDED FOR BATHING: A LITTLE
SUGGESTED CMS G CODE MODIFIER DAILY ACTIVITY: CK
DRESSING REGULAR UPPER BODY CLOTHING: A LITTLE
PERSONAL GROOMING: A LITTLE
DRESSING REGULAR LOWER BODY CLOTHING: A LITTLE
MOVING FROM LYING ON BACK TO SITTING ON SIDE OF FLAT BED: UNABLE
SUGGESTED CMS G CODE MODIFIER MOBILITY: CL
MOBILITY SCORE: 13

## 2018-05-30 ASSESSMENT — PAIN SCALES - GENERAL
PAINLEVEL_OUTOF10: 6
PAINLEVEL_OUTOF10: 3
PAINLEVEL_OUTOF10: 0
PAINLEVEL_OUTOF10: 3
PAINLEVEL_OUTOF10: 0
PAINLEVEL_OUTOF10: 6
PAINLEVEL_OUTOF10: 7
PAINLEVEL_OUTOF10: 3
PAINLEVEL_OUTOF10: 4
PAINLEVEL_OUTOF10: 0
PAINLEVEL_OUTOF10: 6
PAINLEVEL_OUTOF10: 2

## 2018-05-30 ASSESSMENT — GAIT ASSESSMENTS
ASSISTIVE DEVICE: HAND HELD ASSIST
GAIT LEVEL OF ASSIST: CONTACT GUARD ASSIST
DISTANCE (FEET): 150
DEVIATION: BRADYKINETIC

## 2018-05-30 ASSESSMENT — ACTIVITIES OF DAILY LIVING (ADL): TOILETING: INDEPENDENT

## 2018-05-30 NOTE — PROGRESS NOTES
Pulmonary Critical Care Progress Note      Date of service:  5/30/2018    Chief Complaint:   Dizziness, nausea    Interval Events:  24 hour interval history reviewed  Reason for visit:  Acute cerebellar stroke       - oriented x 4   - SR   - RA   - replete Mg and K      She continues to have nausea, but no vomiting.  She denies abdominal pain.  She feels like her balance is somewhat off.  She has no angina, palpitations or syncope.  She has no cough, sputum production, hemoptysis or dyspnea.  She denies abdominal pain.      Review of Systems   Constitutional: Negative for chills, diaphoresis and fever.   HENT: Negative for congestion, ear discharge, ear pain, nosebleeds and sinus pain.    Eyes: Negative for blurred vision, double vision, photophobia and pain.   Respiratory: Negative for cough, hemoptysis, sputum production, shortness of breath and stridor.    Cardiovascular: Negative for chest pain, palpitations, orthopnea and claudication.   Gastrointestinal: Positive for nausea. Negative for abdominal pain, constipation, diarrhea and vomiting.   Genitourinary: Negative for dysuria, frequency, hematuria and urgency.   Musculoskeletal: Negative for back pain, joint pain, myalgias and neck pain.   Skin: Negative for itching and rash.   Neurological: Positive for dizziness. Negative for speech change, seizures and loss of consciousness.   Endo/Heme/Allergies: Negative for environmental allergies and polydipsia. Does not bruise/bleed easily.   Psychiatric/Behavioral: Negative for depression and suicidal ideas. The patient is not nervous/anxious.        Physical Exam   Constitutional: She is oriented to person, place, and time. No distress.   HENT:   Head: Normocephalic and atraumatic.   Right Ear: External ear normal.   Left Ear: External ear normal.   Nose: Nose normal.   Mouth/Throat: Oropharynx is clear and moist.   Eyes: Conjunctivae are normal. Pupils are equal, round, and reactive to light. Right eye exhibits no  discharge. Left eye exhibits no discharge. No scleral icterus.   Neck: Normal range of motion. Neck supple. No JVD present. No tracheal deviation present.   Cardiovascular: Intact distal pulses.  Exam reveals no gallop and no friction rub.    Sinus rhythm   Pulmonary/Chest: Effort normal and breath sounds normal. No stridor. No respiratory distress. She has no wheezes. She has no rales.   Abdominal: Soft. Bowel sounds are normal. She exhibits no distension. There is no tenderness. There is no rebound.   Musculoskeletal: She exhibits no tenderness or deformity.   No clubbing or cyanosis   Neurological: She is alert and oriented to person, place, and time. No cranial nerve deficit. Coordination abnormal. GCS score is 15.   Skin: Skin is warm and dry. No rash noted. She is not diaphoretic. No erythema. No pallor.       PFSH:  No change.    Respiratory:     Pulse Oximetry: 94 %    HemoDynamics:  Pulse: 65, Heart Rate (Monitored): 60  Blood Pressure: 107/70, NIBP: 120/76       Recent Labs      05/28/18   0350   TROPONINI  <0.01       Neuro:    Fluids:  Intake/Output       05/28/18 0700 - 05/29/18 0659 05/29/18 0700 - 05/30/18 0659 05/30/18 0700 - 05/31/18 0659      6521-7546 2791-0547 Total 0772-0288 3945-9874 Total 2068-0871 8319-0665 Total       Intake    P.O.  --  -- --  480  410 890  --  -- --    P.O. -- -- -- 480 410 890 -- -- --    I.V.  1000  -- 1000  415  996 1411  --  -- --    IV Volume (NS 125ml/hr) 1000 -- 1000 -- -- -- -- -- --    NS + K -- -- --  -- -- --    Total Intake 1000 -- 6778 067 0530 2301 -- -- --       Output    Urine  --  -- --  650  2725 3375  --  -- --    Number of Times Voided -- 3 x 3 x 2 x 5 x 7 x -- -- --    Urine Void (mL) (non-catheter) -- -- -- 650 2725 3375 -- -- --    Stool  --  -- --  --  -- --  --  -- --    Number of Times Stooled 1 x -- 1 x -- 0 x 0 x -- -- --    Total Output -- -- -- 076 1281 8165 -- -- --       Net I/O     1000 -- 1000 578 -8209 -3440 -- -- --         Weight: 107.1 kg (236 lb 1.8 oz)  Recent Labs      18   0350  18   0120   SODIUM  138  137   POTASSIUM  4.5  3.3*   CHLORIDE  109  110   CO2  14*  22   BUN  22  14   CREATININE  0.67  0.64   CALCIUM  8.8  8.4*       GI/Nutrition:    Liver Function  Recent Labs      18   0350  18   012   ALTSGPT  33   --    ASTSGOT  32   --    ALKPHOSPHAT  62   --    TBILIRUBIN  0.3   --    LIPASE  18   --    GLUCOSE  119*  96       Heme:  Recent Labs      18   0350  18   012   RBC  4.94  4.20   HEMOGLOBIN  15.5  13.0   HEMATOCRIT  44.7  38.4   PLATELETCT  202  205       Infectious Disease:  Temp  Av.8 °C (98.2 °F)  Min: 36.4 °C (97.6 °F)  Max: 36.9 °C (98.5 °F)    Recent Labs      18   012   WBC  15.7*  19.9*   NEUTSPOLYS  76.50*  72.50*   LYMPHOCYTES  15.50*  18.20*   MONOCYTES  6.10  7.70   EOSINOPHILS  0.50  0.50   BASOPHILS  0.30  0.50   ASTSGOT  32   --    ALTSGPT  33   --    ALKPHOSPHAT  62   --    TBILIRUBIN  0.3   --      Current Facility-Administered Medications   Medication Dose Frequency Provider Last Rate Last Dose   • atorvastatin (LIPITOR) tablet 80 mg  80 mg QHS Sergio Villanueva M.D.   80 mg at 18   • Pharmacy Consult Request   PRN Sergio Villanueva M.D.       • potassium chloride SA (Kdur) tablet 40 mEq  40 mEq Once Sergio Villanueva M.D.   Stopped at 18 0930   • aspirin (ASA) tablet 325 mg  325 mg DAILY Sergio Villanueva M.D.   325 mg at 18 1033   • 0.9 % NaCl with KCl 20 mEq infusion   Continuous Keyon Gibbs M.D. 83 mL/hr at 18 0218     • ondansetron (ZOFRAN) syringe/vial injection 4 mg  4 mg Q4HRS PRN Keyon Gibbs M.D.       • HYDROcodone-acetaminophen (NORCO) 5-325 MG per tablet 1-2 Tab  1-2 Tab Q6HRS PRN Keyon Gibbs M.D.   1 Tab at 18   • senna-docusate (PERICOLACE or SENOKOT S) 8.6-50 MG per tablet 2 Tab  2 Tab BID Dmitry Pond M.D.        And   • polyethylene  glycol/lytes (MIRALAX) PACKET 1 Packet  1 Packet QDAY PRN Dmitry Pond M.D.        And   • magnesium hydroxide (MILK OF MAGNESIA) suspension 30 mL  30 mL QDAY PRN Dmitry Pond M.D.        And   • bisacodyl (DULCOLAX) suppository 10 mg  10 mg QDAY PRN Dmitry Pond M.D.       • Respiratory Care per Protocol   Continuous RT Dmitry Pond M.D.       • ondansetron (ZOFRAN ODT) dispertab 4 mg  4 mg Q4HRS PRN Dmitry Pond M.D.       • promethazine (PHENERGAN) tablet 12.5-25 mg  12.5-25 mg Q4HRS PRN Dmitry Pond M.D.       • promethazine (PHENERGAN) suppository 12.5-25 mg  12.5-25 mg Q4HRS PRN Dmitry Pond M.D.       • prochlorperazine (COMPAZINE) injection 5-10 mg  5-10 mg Q4HRS PRN Dmitry Pond M.D.       • nicotine (NICODERM) 14 MG/24HR 14 mg  14 mg Daily-0600 Dmitry Pond M.D.   14 mg at 05/30/18 0512    And   • nicotine polacrilex (NICORETTE) 2 MG piece 2 mg  2 mg Q HOUR PRN Dmitry Pond M.D.       • acetaminophen (TYLENOL) tablet 650 mg  650 mg Q6HRS PRN Dmitry Pond M.D.   650 mg at 05/30/18 0053   • meclizine (ANTIVERT) tablet 25 mg  25 mg TID PRN Dmitry Pond M.D.         Last reviewed on 5/28/2018 10:53 AM by Paris Ramírez R.N.    Quality  Measures:  Labs reviewed, Medications reviewed and Radiology images reviewed  Dc catheter: No Dc      DVT Prophylaxis: Contraindicated - High bleeding risk  DVT prophylaxis - mechanical: SCDs  Ulcer prophylaxis: Not indicated          Assessment and Plan:    Acute ischemic left cerebellar infarction              -No evidence of her vertebral artery dissection on CT angiography              -Continue aspirin, high intensity statin              -Frequent neurologic checks              -High risk of developing malignant edema in the posterior fossa due to size of infarction     Hypokalemia              -Replete potassium     Dyslipidemia              -Continue statin     History of GERD    Hypomagnesemia   -Replete magnesium      Discussed with RN, RT, clinical  pharmacist, team, hospitalist

## 2018-05-30 NOTE — PROGRESS NOTES
[facility] Hospitalist Progress Note    Date of Service: 2018    Chief Complaint  42 y.o. female admitted 2018 with sudden onset of nausea, vomiting, vertigo and headache, admitted with leukocytosis and question of gastrointestinal component.    Interval Problem Update  Patient can continues to feel somewhat unwell  Feels lightheaded, not necessarily dizzy when she stands up  Less nauseated, no emesis today  Mild occipital headache, denies neck ache, denies blurry vision    Consultants/Specialty  Neurology  Radiology  Interventional radiology  Critical care    Disposition  Keep in ICU for serial neuro checks      Review of Systems   Respiratory: Negative for cough and hemoptysis.    Cardiovascular: Negative for palpitations and orthopnea.   Skin: Negative for itching and rash.   Neurological: Positive for dizziness and headaches. Negative for tingling and tremors.      Physical Exam  Laboratory/Imaging   Hemodynamics  Temp (24hrs), Av.2 °C (98.9 °F), Min:36.7 °C (98 °F), Max:39.9 °C (103.8 °F)   Temperature: (!) 39.9 °C (103.8 °F)  Pulse  Av.7  Min: 62  Max: 99 Heart Rate (Monitored): 71  NIBP: 116/73      Respiratory      Respiration: 17, Pulse Oximetry: 95 %     Work Of Breathing / Effort: Mild  RUL Breath Sounds: Clear, RML Breath Sounds: Clear, RLL Breath Sounds: Diminished, ANDREW Breath Sounds: Clear, LLL Breath Sounds: Diminished    Fluids    Intake/Output Summary (Last 24 hours) at 18 0858  Last data filed at 18 0600   Gross per 24 hour   Intake             2301 ml   Output             3375 ml   Net            -1074 ml       Nutrition  Orders Placed This Encounter   Procedures   • DIET ORDER     Standing Status:   Standing     Number of Occurrences:   1     Order Specific Question:   Diet:     Answer:   Regular [1]     Physical Exam   Constitutional: She is oriented to person, place, and time. She appears well-developed and well-nourished.   HENT:   Head: Normocephalic and atraumatic.    Nose: Nose normal.   Mouth/Throat: Oropharynx is clear and moist.   Eyes: Conjunctivae and EOM are normal. Pupils are equal, round, and reactive to light. Right eye exhibits no discharge. Left eye exhibits no discharge. No scleral icterus.   Neck: Normal range of motion. Neck supple. No JVD present. No thyromegaly present.   Cardiovascular: Normal rate, regular rhythm and normal heart sounds.  Exam reveals no gallop and no friction rub.    Pulses:       Dorsalis pedis pulses are 2+ on the right side, and 2+ on the left side.   Capillary refill <3 secs   Pulmonary/Chest: Effort normal and breath sounds normal. She has no wheezes. She has no rales.   Abdominal: Soft. Bowel sounds are normal. She exhibits no distension and no mass. There is no tenderness. There is no rebound and no guarding.   Musculoskeletal: Normal range of motion. She exhibits no edema or tenderness.   Lymphadenopathy:     She has no cervical adenopathy.   Neurological: She is alert and oriented to person, place, and time. She displays normal reflexes. No cranial nerve deficit or sensory deficit. Coordination abnormal.   Skin: Skin is warm and dry. She is not diaphoretic. No cyanosis.   Psychiatric: Her behavior is normal. Her mood appears anxious. Her speech is rapid and/or pressured.   Nursing note and vitals reviewed.      Recent Labs      05/28/18   0350  05/29/18   0120  05/30/18   0530   WBC  15.7*  19.9*  10.3   RBC  4.94  4.20  4.42   HEMOGLOBIN  15.5  13.0  13.7   HEMATOCRIT  44.7  38.4  39.6   MCV  90.5  91.4  89.6   MCH  31.4  31.0  31.0   MCHC  34.7  33.9  34.6   RDW  40.3  41.2  39.9   PLATELETCT  202  205  193   MPV  10.6  10.8  10.5     Recent Labs      05/28/18   0350  05/29/18   0120  05/30/18   0530   SODIUM  138  137  136   POTASSIUM  4.5  3.3*  3.6   CHLORIDE  109  110  109   CO2  14*  22  21   GLUCOSE  119*  96  90   BUN  22  14  8   CREATININE  0.67  0.64  0.62   CALCIUM  8.8  8.4*  8.2*             Recent Labs       05/30/18   0530   TRIGLYCERIDE  139   HDL  33*   LDL  152*          Assessment/Plan     * CVA (cerebral vascular accident) (HCC)- (present on admission)   Assessment & Plan    Left cerebellar  Posterior circulation distribution  CTA does not demonstrate vertebral artery dissection  She does have an apparent PFO, I ordered a lower extremity duplex ultrasound study today, there is no evidence of DVT  Neurology is consulting  Continue high-dose statin and antiplatelet therapy with aspirin        Leukocytosis- (present on admission)   Assessment & Plan    Possibly reactive          Headache- (present on admission)   Assessment & Plan    From acute CVA        Lactic acidosis- (present on admission)   Assessment & Plan    Suspect secondary to dehydration rather than septic process        Vertigo- (present on admission)   Assessment & Plan    Secondary to acute CVA        Gastroenteritis- (present on admission)   Assessment & Plan    No abdominal symptoms today          Tobacco dependence- (present on admission)   Assessment & Plan    Certainly instructed to quit in light of her acute CVA            Quality-Core Measures   Reviewed items::  Labs reviewed and Medications reviewed  Dc catheter::  No Dc  DVT prophylaxis pharmacological::  Contraindicated - High bleeding risk  DVT prophylaxis - mechanical:  SCDs  Assessed for rehabilitation services:  Patient was assess for and/or received rehabilitation services during this hospitalization

## 2018-05-30 NOTE — CARE PLAN
Problem: Communication  Goal: The ability to communicate needs accurately and effectively will improve  Outcome: PROGRESSING AS EXPECTED  AAO X4    Problem: Pain Management  Goal: Pain level will decrease to patient's comfort goal  PRNs available

## 2018-05-30 NOTE — THERAPY
"Occupational Therapy Evaluation completed.   Functional Status:  Min A LB dressing- donned underpants, CGA standing grooming, SPV eating  Plan of Care: Will benefit from Occupational Therapy 5 times per week  Discharge Recommendations:  Equipment: Will Continue to Assess for Equipment Needs. Post-acute therapy: Due to pt's age and independent PLOF anticipate that pt will progress quickly in Acute care and DC home w/ home health or outpt therapy. Will continue to monitor closely.     See \"Rehab Therapy-Acute\" Patient Summary Report for complete documentation.      Pt is a 41 y/o female who presents to acute 2' left cerebellar CVA. Pt lives alone in in single story house, reports her boyfriend can assist wed-Sunday PRN. Pt presents w/ decreased strength, coordination and sensation in R UE, decreased balance and functional mobility indicating a need for Acute OT services.   "

## 2018-05-30 NOTE — CARE PLAN
Problem: Safety  Goal: Will remain free from falls  Bed alarm on. Fall precautions in place. Bed is locked and in lowest position. Pt has treaded slippers in place. Pt educated on call light/phone system. Pt verbalizes understanding. Pt calls appropriately.     Problem: Venous Thromboembolism (VTW)/Deep Vein Thrombosis (DVT) Prevention:  Goal: Patient will participate in Venous Thrombosis (VTE)/Deep Vein Thrombosis (DVT)Prevention Measures    Intervention: Ensure patient wears graduated elastic stockings (AGATHA hose) and/or SCDs, if ordered, when in bed or chair (Remove at least once per shift for skin check)  Pt has SCDs in place. Pt educated regarding indication, pt verbalizes understanding.      Problem: Fluid Volume:  Goal: Will maintain balanced intake and output  Pt tolerating PO liquids well. NS + 20K running to PIV at 83 mL/hr.    Problem: Pain Management  Goal: Pain level will decrease to patient's comfort goal  Pt reports pain to R side of head with movement. Pt declines meds. Pt repositioned to comfort, emotional support provided.

## 2018-05-30 NOTE — PROGRESS NOTES
[facility] Hospitalist Progress Note    Date of Service: 2018    Chief Complaint  42 y.o. female admitted 2018 with sudden onset of nausea vomiting vertigo and headache, admitted with leukocytosis and question of gastrointestinal component  Interval Problem Update  Patient seen and examined today.    Patient tolerating treatment and therapies.  All Data, Medication data reviewed.  Case discussed with nursing as available.  Plan of Care reviewed with patient and notified of changes.   the patient was evaluated in the morning during rounds and continued to have severe headache, somewhat improved nausea vomiting, the MRI was resulted as an acute stroke.  The patient was referred to the critical care and neurology, the case was discussed with interventional radiology as well as radiology physicians.  Acute transfer to ICU is initiated    Consultants/Specialty  Neurology  Radiology  Interventional radiology  Critical care    Disposition  ICU treatment        Review of Systems   Constitutional: Positive for malaise/fatigue. Negative for chills and fever.   HENT: Negative.    Eyes: Negative.  Negative for blurred vision and double vision.   Respiratory: Negative.  Negative for cough.    Cardiovascular: Negative.  Negative for chest pain and palpitations.   Gastrointestinal: Positive for nausea and vomiting. Negative for heartburn.   Genitourinary: Negative.  Negative for dysuria and frequency.   Musculoskeletal: Negative.  Negative for back pain and neck pain.   Skin: Negative.  Negative for itching and rash.   Neurological: Positive for dizziness, weakness and headaches. Negative for focal weakness.   Endo/Heme/Allergies: Negative.  Negative for polydipsia. Does not bruise/bleed easily.   Psychiatric/Behavioral: Positive for substance abuse. Negative for depression. The patient is nervous/anxious.       Physical Exam  Laboratory/Imaging   Hemodynamics  Temp (24hrs), Av.7 °C (98 °F), Min:36.4 °C (97.6 °F),  "Max:36.9 °C (98.5 °F)   Temperature: 36.9 °C (98.5 °F)  Pulse  Av.3  Min: 77  Max: 93    Blood Pressure: 107/70      Respiratory      Respiration: 17, Pulse Oximetry: 94 %     Work Of Breathing / Effort: Mild;Shallow (pt reports difficulty \"taking a full breath\")  RUL Breath Sounds: Clear, RML Breath Sounds: Clear, RLL Breath Sounds: Diminished, ANDREW Breath Sounds: Clear, LLL Breath Sounds: Diminished    Fluids    Intake/Output Summary (Last 24 hours) at 18 1736  Last data filed at 18 1600   Gross per 24 hour   Intake             1489 ml   Output              650 ml   Net              839 ml       Nutrition  Orders Placed This Encounter   Procedures   • DIET ORDER     Standing Status:   Standing     Number of Occurrences:   1     Order Specific Question:   Diet:     Answer:   Regular [1]     Physical Exam   Constitutional: She is oriented to person, place, and time. She appears well-developed and well-nourished.   HENT:   Head: Normocephalic and atraumatic.   Nose: Nose normal.   Mouth/Throat: Oropharynx is clear and moist.   Eyes: Conjunctivae and EOM are normal. Pupils are equal, round, and reactive to light. Right eye exhibits no discharge.   Neck: Normal range of motion. Neck supple. No JVD present. No thyromegaly present.   Cardiovascular: Normal rate, regular rhythm and normal heart sounds.  Exam reveals no gallop and no friction rub.    Pulses:       Dorsalis pedis pulses are 2+ on the right side, and 2+ on the left side.   Capillary refill <3 secs   Pulmonary/Chest: Effort normal and breath sounds normal. She has no wheezes. She has no rales.   Abdominal: Soft. Bowel sounds are normal. She exhibits no distension and no mass. There is no tenderness. There is no rebound and no guarding.   Musculoskeletal: Normal range of motion. She exhibits no edema or tenderness.   Lymphadenopathy:     She has no cervical adenopathy.   Neurological: She is alert and oriented to person, place, and time. She " displays normal reflexes. No cranial nerve deficit or sensory deficit. Coordination abnormal.   Skin: Skin is warm and dry. She is not diaphoretic. No cyanosis.   Psychiatric: Her behavior is normal. Her mood appears anxious. Her speech is rapid and/or pressured.   Nursing note and vitals reviewed.      Recent Labs      05/28/18   0350  05/29/18   0120   WBC  15.7*  19.9*   RBC  4.94  4.20   HEMOGLOBIN  15.5  13.0   HEMATOCRIT  44.7  38.4   MCV  90.5  91.4   MCH  31.4  31.0   MCHC  34.7  33.9   RDW  40.3  41.2   PLATELETCT  202  205   MPV  10.6  10.8     Recent Labs      05/28/18   0350  05/29/18   0120   SODIUM  138  137   POTASSIUM  4.5  3.3*   CHLORIDE  109  110   CO2  14*  22   GLUCOSE  119*  96   BUN  22  14   CREATININE  0.67  0.64   CALCIUM  8.8  8.4*                      Assessment/Plan     Headache   Assessment & Plan    From acute CVA        CVA (cerebral vascular accident) (HCC)   Assessment & Plan    Left cerebellar  Posterior circulation distribution  MRI with possible vertebral dissection  Neurology is consulting        Leukocytosis- (present on admission)   Assessment & Plan    Possibly reactive          Gastroenteritis- (present on admission)   Assessment & Plan    Doubt this diagnosis, although concern of leukocytosis  The patient has an acute cerebellar stroke          Lactic acidosis- (present on admission)   Assessment & Plan    Patient is not septic at this time  Continue IV fluid hydration and trend lactate to resolution        Vertigo- (present on admission)   Assessment & Plan    Secondary to acute CVA        Tobacco dependence- (present on admission)   Assessment & Plan    Certainly instructed to quit in light of her acute CVA            Quality-Core Measures   Reviewed items::  Radiology images reviewed, Labs reviewed, Medications reviewed and EKG reviewed  Dc catheter::  No Dc  DVT prophylaxis pharmacological::  Contraindicated - High bleeding risk  DVT prophylaxis - mechanical:   SCDs  Assessed for rehabilitation services:  Patient was assess for and/or received rehabilitation services during this hospitalization      Plan  Neurology consultation obtained  Case discussed with interventional radiology  Findings discussed with radiology  Critical CARE transfer secondary to frequency of neurochecks  Follow on possible infection, though less likely  Telemonitoring  Follow up on cardiac workup  Aspirin  High-dose statin therapy  Secondary to the size of her CVA posterior fossa syndrome is possible  High alert on worsening neurologic status  Patient is critically ill  Transfer to ICU is initiated  High risk  Additional data is currently unavailable  CT angiograms head and neck are ordered to evaluate for possible dissection  Extended time is spent in consulting with multiple physicians

## 2018-05-30 NOTE — THERAPY
"Physical Therapy Evaluation completed.   Bed Mobility:  Supine to Sit: stand by assist with max raised HOB, increased time   Transfers: Sit to Stand: Supervised  Gait: Level Of Assist: Contact Guard Assist with hand hold assist   Plan of Care: Will benefit from Physical Therapy 5 times per week  Discharge Recommendations: Equipment: Will Continue to Assess for Equipment Needs.    Pt presents with impaired activity tolerance, dynamic balance, right LE strength s/p L cerebellar infarct (PICA); pt is most limited by nausea and flushed feeling this session (110s/60s), does endorse significant allergies of all kinds; steady throughout. Pt mobilizing quite well considering, right LE improves with repetition of movement, grossly from 2-/5 cold to 3+/5 with warm up; in current condition would benefit most from acute rehab for close monitoring and continued therapy, however anticipate steady quick progress and may very well progress to home if remains in house. Will follow.     See \"Rehab Therapy-Acute\" Patient Summary Report for complete documentation.     "

## 2018-05-30 NOTE — CARE PLAN
Problem: Infection  Goal: Will remain free from infection  Outcome: PROGRESSING AS EXPECTED    Intervention: Assess signs and symptoms of infection  assessed  Intervention: Implement standard precautions and perform hand washing before and after patient contact  implemented  Intervention: Give CDC handouts for infection prevention (infection prevention/hand washing, disease specific, and device specific) and document in Education  provided  Intervention: Assess for removal of potential routes of infection, such as IV, central line, intra-arterial or urinary catheters  assessd

## 2018-05-30 NOTE — ASSESSMENT & PLAN NOTE
Left cerebellar  Posterior circulation distribution  CTA does not demonstrate vertebral artery dissection  No DVT on duplex ultrasound 5/30/2018  Continue high-dose statin and antiplatelet therapy with aspirin  Outpatient referral for PFO closure

## 2018-05-31 LAB
ANION GAP SERPL CALC-SCNC: 7 MMOL/L (ref 0–11.9)
BASOPHILS # BLD AUTO: 0.7 % (ref 0–1.8)
BASOPHILS # BLD: 0.07 K/UL (ref 0–0.12)
BUN SERPL-MCNC: 10 MG/DL (ref 8–22)
CALCIUM SERPL-MCNC: 8.1 MG/DL (ref 8.5–10.5)
CHLORIDE SERPL-SCNC: 110 MMOL/L (ref 96–112)
CO2 SERPL-SCNC: 19 MMOL/L (ref 20–33)
CREAT SERPL-MCNC: 0.62 MG/DL (ref 0.5–1.4)
EOSINOPHIL # BLD AUTO: 0.28 K/UL (ref 0–0.51)
EOSINOPHIL NFR BLD: 2.7 % (ref 0–6.9)
ERYTHROCYTE [DISTWIDTH] IN BLOOD BY AUTOMATED COUNT: 40.5 FL (ref 35.9–50)
GLUCOSE SERPL-MCNC: 92 MG/DL (ref 65–99)
HCT VFR BLD AUTO: 39.7 % (ref 37–47)
HGB BLD-MCNC: 13.3 G/DL (ref 12–16)
IMM GRANULOCYTES # BLD AUTO: 0.06 K/UL (ref 0–0.11)
IMM GRANULOCYTES NFR BLD AUTO: 0.6 % (ref 0–0.9)
LYMPHOCYTES # BLD AUTO: 3.7 K/UL (ref 1–4.8)
LYMPHOCYTES NFR BLD: 35.5 % (ref 22–41)
MAGNESIUM SERPL-MCNC: 1.8 MG/DL (ref 1.5–2.5)
MCH RBC QN AUTO: 30.8 PG (ref 27–33)
MCHC RBC AUTO-ENTMCNC: 33.5 G/DL (ref 33.6–35)
MCV RBC AUTO: 91.9 FL (ref 81.4–97.8)
MONOCYTES # BLD AUTO: 0.91 K/UL (ref 0–0.85)
MONOCYTES NFR BLD AUTO: 8.7 % (ref 0–13.4)
NEUTROPHILS # BLD AUTO: 5.41 K/UL (ref 2–7.15)
NEUTROPHILS NFR BLD: 51.8 % (ref 44–72)
NRBC # BLD AUTO: 0 K/UL
NRBC BLD-RTO: 0 /100 WBC
PLATELET # BLD AUTO: 193 K/UL (ref 164–446)
PMV BLD AUTO: 10.6 FL (ref 9–12.9)
POTASSIUM SERPL-SCNC: 3.9 MMOL/L (ref 3.6–5.5)
RBC # BLD AUTO: 4.32 M/UL (ref 4.2–5.4)
SODIUM SERPL-SCNC: 136 MMOL/L (ref 135–145)
WBC # BLD AUTO: 10.4 K/UL (ref 4.8–10.8)

## 2018-05-31 PROCEDURE — 770022 HCHG ROOM/CARE - ICU (200)

## 2018-05-31 PROCEDURE — A9270 NON-COVERED ITEM OR SERVICE: HCPCS | Performed by: INTERNAL MEDICINE

## 2018-05-31 PROCEDURE — 700102 HCHG RX REV CODE 250 W/ 637 OVERRIDE(OP): Performed by: HOSPITALIST

## 2018-05-31 PROCEDURE — 99233 SBSQ HOSP IP/OBS HIGH 50: CPT | Performed by: HOSPITALIST

## 2018-05-31 PROCEDURE — A9270 NON-COVERED ITEM OR SERVICE: HCPCS | Performed by: HOSPITALIST

## 2018-05-31 PROCEDURE — 99233 SBSQ HOSP IP/OBS HIGH 50: CPT | Performed by: INTERNAL MEDICINE

## 2018-05-31 PROCEDURE — 97112 NEUROMUSCULAR REEDUCATION: CPT

## 2018-05-31 PROCEDURE — 700111 HCHG RX REV CODE 636 W/ 250 OVERRIDE (IP): Performed by: INTERNAL MEDICINE

## 2018-05-31 PROCEDURE — 700102 HCHG RX REV CODE 250 W/ 637 OVERRIDE(OP): Performed by: INTERNAL MEDICINE

## 2018-05-31 PROCEDURE — 97530 THERAPEUTIC ACTIVITIES: CPT

## 2018-05-31 PROCEDURE — 85025 COMPLETE CBC W/AUTO DIFF WBC: CPT

## 2018-05-31 PROCEDURE — 80048 BASIC METABOLIC PNL TOTAL CA: CPT

## 2018-05-31 PROCEDURE — 83735 ASSAY OF MAGNESIUM: CPT

## 2018-05-31 RX ORDER — MAGNESIUM SULFATE HEPTAHYDRATE 40 MG/ML
2 INJECTION, SOLUTION INTRAVENOUS ONCE
Status: COMPLETED | OUTPATIENT
Start: 2018-05-31 | End: 2018-05-31

## 2018-05-31 RX ADMIN — ATORVASTATIN CALCIUM 80 MG: 40 TABLET, FILM COATED ORAL at 20:16

## 2018-05-31 RX ADMIN — MAGNESIUM SULFATE IN WATER 2 G: 40 INJECTION, SOLUTION INTRAVENOUS at 10:08

## 2018-05-31 RX ADMIN — NICOTINE 14 MG: 14 PATCH, EXTENDED RELEASE TRANSDERMAL at 05:40

## 2018-05-31 RX ADMIN — ASPIRIN 325 MG: 325 TABLET ORAL at 08:19

## 2018-05-31 RX ADMIN — STANDARDIZED SENNA CONCENTRATE AND DOCUSATE SODIUM 2 TABLET: 8.6; 5 TABLET, FILM COATED ORAL at 08:19

## 2018-05-31 RX ADMIN — HYDROCODONE BITARTRATE AND ACETAMINOPHEN 1 TABLET: 5; 325 TABLET ORAL at 06:06

## 2018-05-31 ASSESSMENT — ENCOUNTER SYMPTOMS
SEIZURES: 0
WEAKNESS: 0
SINUS PAIN: 0
DOUBLE VISION: 0
NECK PAIN: 0
SHORTNESS OF BREATH: 0
ORTHOPNEA: 0
PHOTOPHOBIA: 0
HEMOPTYSIS: 0
DEPRESSION: 0
DIZZINESS: 1
HEADACHES: 1
COUGH: 0
BRUISES/BLEEDS EASILY: 0
MYALGIAS: 0
DIARRHEA: 0
ABDOMINAL PAIN: 0
NERVOUS/ANXIOUS: 0
EYE PAIN: 0
SPEECH CHANGE: 0
TREMORS: 0
TINGLING: 0
POLYDIPSIA: 0
NAUSEA: 1
CONSTIPATION: 0
SPUTUM PRODUCTION: 0
VOMITING: 0
BACK PAIN: 0
BLURRED VISION: 0
LOSS OF CONSCIOUSNESS: 0
CLAUDICATION: 0
PALPITATIONS: 0

## 2018-05-31 ASSESSMENT — PATIENT HEALTH QUESTIONNAIRE - PHQ9
SUM OF ALL RESPONSES TO PHQ9 QUESTIONS 1 AND 2: 0
2. FEELING DOWN, DEPRESSED, IRRITABLE, OR HOPELESS: NOT AT ALL
1. LITTLE INTEREST OR PLEASURE IN DOING THINGS: NOT AT ALL

## 2018-05-31 ASSESSMENT — PAIN SCALES - GENERAL
PAINLEVEL_OUTOF10: 0
PAINLEVEL_OUTOF10: 8
PAINLEVEL_OUTOF10: 3
PAINLEVEL_OUTOF10: 3
PAINLEVEL_OUTOF10: 0
PAINLEVEL_OUTOF10: 0
PAINLEVEL_OUTOF10: 8
PAINLEVEL_OUTOF10: 0
PAINLEVEL_OUTOF10: 3

## 2018-05-31 ASSESSMENT — GAIT ASSESSMENTS
ASSISTIVE DEVICE: FRONT WHEEL WALKER
DISTANCE (FEET): 200
DEVIATION: BRADYKINETIC
GAIT LEVEL OF ASSIST: CONTACT GUARD ASSIST

## 2018-05-31 ASSESSMENT — COGNITIVE AND FUNCTIONAL STATUS - GENERAL
CLIMB 3 TO 5 STEPS WITH RAILING: A LITTLE
SUGGESTED CMS G CODE MODIFIER MOBILITY: CJ
WALKING IN HOSPITAL ROOM: A LITTLE
MOBILITY SCORE: 22

## 2018-05-31 NOTE — CONSULTS
DATE OF SERVICE:  05/31/2018    REQUESTING PHYSICIAN:  Dr. Dav Leal, hospitalist.    REASON FOR CONSULTATION:  Cerebellar stroke with patent foramen ovale on   transthoracic echocardiography.    HISTORY OF PRESENT ILLNESS:  The patient is a 42-year-old  female,   smoker, but no known major medical problem, who presented to the hospital a   few days ago with sudden onset of lightheadedness, nausea, vomiting and   vertigo-like sensation.  Subsequent evaluation revealed left cerebellar   stroke.  The patient had been seen by neurologist, who recommended   transesophageal echocardiography to rule out intracardiac thrombus.  She also   underwent transesophageal echocardiography a couple of days ago, which   reportedly was positive for right to left shunt from patent foramen ovale. Her  cardiac dimensions, LV systolic function and wall motion were normal.    Patient has no known history of cardiac arrhythmia, but does report occasional palpitations   described as a strong thumping.    ALLERGIES:  SHE REPORTED ALLERGIC TO AUGMENTIN AND SULFA DRUG.    HOME MEDICATIONS:  Reportedly include furosemide 20 mg daily, K-Dur 10 mEq   daily, and ibuprofen PCI.    CURRENT HOSPITAL MEDICATIONS:  Include aspirin 325 mg daily, atorvastatin 80   mg daily, Nicoderm patch p.r.n. meclizine.    PAST MEDICAL HISTORY:  Denied diabetes mellitus or hypertension.  Positive for   acid reflux, hypercholesterolemia.  Past history of anxiety and panic attack.    PAST SURGICAL HISTORY:  Remarkable for vaginal hysterectomy with bilateral   salpingo-oophorectomy, cholecystectomy, appendectomy, tonsillectomy and breast   biopsy.    FAMILY HISTORY:  Denied any family history of cardiac arrhythmia, but I think   that some one in family may have a stroke, but the detail is unknown.    SOCIAL HISTORY:  Smokes about pack a day for about 20 years.  Denied alcohol   or drug use.  Drinks rarely.    REVIEW OF SYSTEMS:  Dizziness and headache improved.   Denied chest pain,   shortness of breath, fever, chill, orthopnea, paroxysmal nocturnal dyspnea.    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  She denied any diarrhea, constipation, focal weakness or numbness.  All   other systems are negative.    PHYSICAL EXAMINATION:  GENERAL:  Reveals 42-year-old  female, not in acute distress, not   dyspneic, alert, oriented x3.  VITAL SIGNS:  Blood pressure 127/80, heart rate 75, respiratory rate 22.  HEENT:  Atraumatic and normocephalic.  NECK:  Supple, no JVD, no carotid bruits, no thyromegaly or lymphadenopathy.  LUNGS:  Good expansion.  No rales or wheezing.  HEART:  Regular rhythm.  No murmur, rub or gallop.  ABDOMEN:  Soft, no mass, no bruits.  EXTREMITIES:  No clubbing or cyanosis.  With trace edema.  SKIN:  No ecchymosis or petechiae.  NEUROLOGIC:  No focal weakness or numbness.  Mild truncal ataxia.    LABORATORY DATA:  Electrocardiogram from May 28th, by my review, showed sinus   rhythm with relatively low voltage in the precordial leads, but otherwise   unremarkable.  Echocardiography, as mentioned above, is positive for right to   left shunt with normal cardiac dimension and systolic function and wall   motion.  There is also mild mitral and aortic insufficiency.  Pulmonary   pressure was normal.  CBC, hemoglobin 13, platelets 193.  CMP, a few days ago,   was normal except for glucose 119, bicarbonate 14, total cholesterol 213, LDL   152, HDL 33.    IMPRESSION:  1.  Left cerebellar stroke.  Transthoracic echocardiography suggested patent   foramen ovale.  She does not appear to have atrial fibrillation or dysrhythmia   that typically predispose intracardiac thrombus.  The neurologist has requested   a transesophageal echocardiography which would also allow us to better assess   the patent foramen ovale.  We will try to arrange for that tomorrow.  Risks   and benefits were discussed with the patient understood,  accepted and wished   to proceed.  2.  Hypercholesterolemia.  Agree with the statin.  3.  Tobacco use.    PLAN:  We will try to arrange for a transesophageal echocardiography tomorrow.    I agree with statin and risk factor modification.  If no other explanation   for her stroke, may need to consider closure of her patent foramen ovale in   the future.  We will follow the patient along with you.    Thank you for allowing us to participate in the care of this patient.       ____________________________________     MD REMINGTON SAUCEDO / WILBERT    DD:  05/31/2018 10:53:41  DT:  05/31/2018 11:55:32    D#:  [id number]  Job#:  [id number]

## 2018-05-31 NOTE — PROGRESS NOTES
[facility] Hospitalist Progress Note    Date of Service: 2018    Chief Complaint  42 y.o. female admitted 2018 with sudden onset of nausea, vomiting, vertigo and headache, admitted with leukocytosis and question of gastrointestinal component.    Interval Problem Update  Mild headache, better than yesterday, not positional  No longer nauseated, tolerating regular diet, no emesis  Feels off when she stands up to walk, gait is relatively steady  No blurry vision    Consultants/Specialty  Neurology  Radiology  Interventional radiology  Critical care    Disposition  Keep in ICU for serial neuro checks      Review of Systems   Respiratory: Negative for cough and hemoptysis.    Cardiovascular: Negative for palpitations and orthopnea.   Skin: Negative for itching and rash.   Neurological: Positive for dizziness and headaches. Negative for tingling and tremors.      Physical Exam  Laboratory/Imaging   Hemodynamics  Temp (24hrs), Av.7 °C (98 °F), Min:36.1 °C (97 °F), Max:37.1 °C (98.8 °F)   Temperature: 36.1 °C (97 °F)  Pulse  Av.4  Min: 58  Max: 99 Heart Rate (Monitored): 73  NIBP: 115/86      Respiratory      Respiration: 15, Pulse Oximetry: 95 %     Work Of Breathing / Effort: Mild  RUL Breath Sounds: Clear, RML Breath Sounds: Clear, RLL Breath Sounds: Clear;Diminished, ANDREW Breath Sounds: Clear, LLL Breath Sounds: Clear;Diminished    Fluids    Intake/Output Summary (Last 24 hours) at 18 0903  Last data filed at 18 0800   Gross per 24 hour   Intake             2652 ml   Output             2575 ml   Net               77 ml       Nutrition  Orders Placed This Encounter   Procedures   • DIET ORDER     Standing Status:   Standing     Number of Occurrences:   1     Order Specific Question:   Diet:     Answer:   Regular [1]     Physical Exam   Constitutional: She is oriented to person, place, and time. She appears well-developed and well-nourished.   HENT:   Head: Normocephalic and atraumatic.   Nose:  Nose normal.   Mouth/Throat: Oropharynx is clear and moist.   Eyes: Conjunctivae and EOM are normal. Pupils are equal, round, and reactive to light. Right eye exhibits no discharge. Left eye exhibits no discharge.   Neck: Normal range of motion. Neck supple. No JVD present. No thyromegaly present.   Cardiovascular: Normal rate, regular rhythm and normal heart sounds.  Exam reveals no gallop and no friction rub.    Pulses:       Dorsalis pedis pulses are 2+ on the right side, and 2+ on the left side.   Pulmonary/Chest: Effort normal and breath sounds normal. She has no wheezes. She has no rales.   Abdominal: Soft. Bowel sounds are normal. She exhibits no distension and no mass. There is no tenderness.   Musculoskeletal: Normal range of motion. She exhibits no edema or tenderness.   Lymphadenopathy:     She has no cervical adenopathy.   Neurological: She is alert and oriented to person, place, and time. She displays normal reflexes. No cranial nerve deficit or sensory deficit. Coordination abnormal.   Skin: Skin is warm and dry. No rash noted. She is not diaphoretic. No cyanosis.   Psychiatric: Her behavior is normal. Her mood appears anxious. Her speech is rapid and/or pressured.   Nursing note and vitals reviewed.      Recent Labs      05/29/18   0120  05/30/18   0530  05/31/18   0604   WBC  19.9*  10.3  10.4   RBC  4.20  4.42  4.32   HEMOGLOBIN  13.0  13.7  13.3   HEMATOCRIT  38.4  39.6  39.7   MCV  91.4  89.6  91.9   MCH  31.0  31.0  30.8   MCHC  33.9  34.6  33.5*   RDW  41.2  39.9  40.5   PLATELETCT  205  193  193   MPV  10.8  10.5  10.6     Recent Labs      05/29/18   0120  05/30/18   0530  05/31/18   0604   SODIUM  137  136  136   POTASSIUM  3.3*  3.6  3.9   CHLORIDE  110  109  110   CO2  22  21  19*   GLUCOSE  96  90  92   BUN  14  8  10   CREATININE  0.64  0.62  0.62   CALCIUM  8.4*  8.2*  8.1*             Recent Labs      05/30/18   0530   TRIGLYCERIDE  139   HDL  33*   LDL  152*          Assessment/Plan      * CVA (cerebral vascular accident) (HCC)- (present on admission)   Assessment & Plan    Left cerebellar  Posterior circulation distribution  CTA does not demonstrate vertebral artery dissection  She does have an apparent PFO, I ordered a lower extremity duplex ultrasound study today, there is no evidence of DVT  Neurology is consulting  Continue high-dose statin and antiplatelet therapy with aspirin        Leukocytosis- (present on admission)   Assessment & Plan    Possibly reactive          PFO (patent foramen ovale)- (present on admission)   Assessment & Plan    Appreciate cardiology consultation  DANICA tomorrow if can be scheduled        Headache- (present on admission)   Assessment & Plan    From acute CVA        Lactic acidosis- (present on admission)   Assessment & Plan    Suspect secondary to dehydration rather than septic process        Vertigo- (present on admission)   Assessment & Plan    Secondary to acute CVA        Gastroenteritis- (present on admission)   Assessment & Plan    No abdominal symptoms today          Tobacco dependence- (present on admission)   Assessment & Plan    Certainly instructed to quit in light of her acute CVA            Quality-Core Measures   Reviewed items::  Labs reviewed and Medications reviewed  Dc catheter::  No Dc  DVT prophylaxis pharmacological::  Contraindicated - High bleeding risk  DVT prophylaxis - mechanical:  SCDs  Assessed for rehabilitation services:  Patient was assess for and/or received rehabilitation services during this hospitalization

## 2018-05-31 NOTE — THERAPY
"Physical Therapy Treatment completed.   Bed Mobility:  Supine to Sit: Supervised  Transfers: Sit to Stand: Supervised  Gait: Level Of Assist: Contact Guard Assist with Front-Wheel Walker       Plan of Care: Will benefit from Physical Therapy 5 times per week  Discharge Recommendations: Equipment: Will Continue to Assess for Equipment Needs.     Pt appears to have improved her overall mobility, but does remain challenged with dynamic stability and requires FWW or HHA for balance. During more fine coordination tasks, pt requires extra time for accuracy. During gross motor tasks such as gait, pt still below her baseline velocity but demonstrates a step through pattern and narrow base of support. BP stable during tx in 120s/80s mmHg. While here, pt will benefit from ongoing PT intervention for progression. DC recs will depend on medical/surgical POC establishment after DANICA tomorrow.      See \"Rehab Therapy-Acute\" Patient Summary Report for complete documentation.       "

## 2018-05-31 NOTE — CARE PLAN
Problem: Safety  Goal: Will remain free from falls  Outcome: PROGRESSING AS EXPECTED  RN to educate pt on risk for fall especially with dizziness and right-sided weakness. RN to alert pt to call light and encourage usage when needing assistance such as toileting, repositioning, or any other comfort needs. RN to offer toileting with scheduled rounding to ensure pt's needs are met. RN to ensure bed is in lowest position, brake is set, and alarm is on for pt's safety. RN to assess mobility needs of pt and educate pt to dangle legs at EOB prior to standing to help with dizziness and ensure pt's gets out of bed on stronger side.     Problem: Respiratory:  Goal: Respiratory status will improve    Intervention: Educate and encourage incentive spirometry usage  RN to educate pt on incentive spirometry usage and importance. RN to offer usage during q2hr neuro checks. RN to  and use positive reinforcement during usage.

## 2018-05-31 NOTE — CARE PLAN
Problem: Bowel/Gastric:  Goal: Normal bowel function is maintained or improved  Outcome: PROGRESSING AS EXPECTED  Educate pt about diet, fluid intake, medications and activity to promote bowel function.Educate on s/s of constipation and interventions to implement.    Problem: Fluid Volume:  Goal: Will maintain balanced intake and output  Outcome: PROGRESSING AS EXPECTED  Monitor and educate intake and output of liquids  Monitor urinary output

## 2018-05-31 NOTE — PROGRESS NOTES
Pulmonary Critical Care Progress Note      Date of service:  5/31/2018    Chief Complaint:   Dizziness, nausea    Interval Events:  24 hour interval history reviewed  Reason for visit:  Acute cerebellar stroke       - dizzy with ambulation   - HA   - oriented x 4   - q 2 neuro checks   - SR      She continues to be dizzy with ambulation.  She is somewhat nauseated.  She has not vomiting.  She denies abdominal pain.  She has no cough, sputum production, hemoptysis or dyspnea.  She denies angina, palpitations or syncope.  She has a headache.      Review of Systems   Constitutional: Negative for malaise/fatigue.   HENT: Negative for congestion, ear discharge, ear pain, nosebleeds and sinus pain.    Eyes: Negative for blurred vision, double vision, photophobia and pain.   Respiratory: Negative for cough, hemoptysis, sputum production and shortness of breath.    Cardiovascular: Negative for chest pain, palpitations, orthopnea and claudication.   Gastrointestinal: Positive for nausea. Negative for abdominal pain, constipation, diarrhea and vomiting.   Genitourinary: Negative for dysuria, frequency and urgency.   Musculoskeletal: Negative for back pain, myalgias and neck pain.   Skin: Negative for itching.   Neurological: Positive for dizziness. Negative for speech change, seizures, loss of consciousness and weakness.   Endo/Heme/Allergies: Negative for environmental allergies and polydipsia. Does not bruise/bleed easily.   Psychiatric/Behavioral: Negative for depression and suicidal ideas. The patient is not nervous/anxious.        Physical Exam   Constitutional: She is oriented to person, place, and time and well-developed, well-nourished, and in no distress. No distress.   HENT:   Head: Normocephalic and atraumatic.   Right Ear: External ear normal.   Left Ear: External ear normal.   Mouth/Throat: No oropharyngeal exudate.   Eyes: Conjunctivae are normal. Pupils are equal, round, and reactive to light. Right eye  exhibits no discharge. Left eye exhibits no discharge. No scleral icterus.   Neck: Normal range of motion. Neck supple. No JVD present. No tracheal deviation present.   Cardiovascular: Intact distal pulses.  Exam reveals no gallop and no friction rub.    Sinus rhythm   Pulmonary/Chest: Effort normal and breath sounds normal. No stridor. No respiratory distress. She has no wheezes. She has no rales.   Abdominal: Soft. Bowel sounds are normal. She exhibits no distension. There is no tenderness. There is no rebound.   Musculoskeletal: She exhibits no tenderness or deformity.   No clubbing or cyanosis   Neurological: She is alert and oriented to person, place, and time. No cranial nerve deficit. Coordination abnormal. GCS score is 15.   Skin: Skin is warm and dry. No rash noted. She is not diaphoretic. No erythema. No pallor.       PFSH:  No change.    Respiratory:     Pulse Oximetry: 93 %    HemoDynamics:  Pulse: 66, Heart Rate (Monitored): 66  NIBP: 128/74       Neuro:    Fluids:  Intake/Output       05/29/18 0700 - 05/30/18 0659 05/30/18 0700 - 05/31/18 0659 05/31/18 0700 - 06/01/18 0659      3179-3271 1923-4404 Total 5712-4436 8792-4512 Total 0535-4067 4245-5521 Total       Intake    P.O.  480  410 890  650  600 1250  --  -- --    P.O. 480 410 890  -- -- --    I.V.  415  996 1411  --  996 996  --  -- --    NS + K  -- 996 996 -- -- --    Total Intake 895 1406 2301 650 1596 2246 -- -- --       Output    Urine  650  2725 3375  1250  1025 2275  --  -- --    Number of Times Voided 2 x 5 x 7 x 3 x 3 x 6 x -- -- --    Urine Void (mL) (non-catheter) 650 2725 3375 1250 1025 2275 -- -- --    Stool  --  -- --  --  0 0  --  -- --    Number of Times Stooled -- 0 x 0 x -- 0 x 0 x -- -- --    Measurable Stool (mL) -- -- -- -- 0 0 -- -- --    Total Output 116 8246 6485 0481 4371 9752 -- -- --       Net I/O     492 -4038 -4158 -101 571 -29 -- -- --        Weight: 107.4 kg (236 lb 12.4 oz)  Recent Labs       18   0120  18   0530   SODIUM  137  136   POTASSIUM  3.3*  3.6   CHLORIDE  110  109   CO2  22  21   BUN  14  8   CREATININE  0.64  0.62   MAGNESIUM   --   1.7   CALCIUM  8.4*  8.2*       GI/Nutrition:    Liver Function  Recent Labs      18   0120  18   0530   GLUCOSE  96  90       Heme:  Recent Labs      18   0530   RBC  4.20  4.42   HEMOGLOBIN  13.0  13.7   HEMATOCRIT  38.4  39.6   PLATELETCT  205  193       Infectious Disease:  Temp  Av.8 °C (98.2 °F)  Min: 36.4 °C (97.6 °F)  Max: 37.3 °C (99.1 °F)    Recent Labs      18   0530   WBC  19.9*  10.3   NEUTSPOLYS  72.50*  45.60   LYMPHOCYTES  18.20*  43.00*   MONOCYTES  7.70  8.10   EOSINOPHILS  0.50  2.40   BASOPHILS  0.50  0.40     Current Facility-Administered Medications   Medication Dose Frequency Provider Last Rate Last Dose   • atorvastatin (LIPITOR) tablet 80 mg  80 mg QHS Sergio Villanueva M.D.   80 mg at 18   • aspirin (ASA) tablet 325 mg  325 mg DAILY Sergio Villanueva M.D.   325 mg at 18   • 0.9 % NaCl with KCl 20 mEq infusion   Continuous Keyon Gibbs M.D. 83 mL/hr at 18     • ondansetron (ZOFRAN) syringe/vial injection 4 mg  4 mg Q4HRS PRN Keyon Gibbs M.D.   4 mg at 18 0959   • HYDROcodone-acetaminophen (NORCO) 5-325 MG per tablet 1-2 Tab  1-2 Tab Q6HRS PRN Keyon Gibbs M.D.   1 Tab at 18 06   • senna-docusate (PERICOLACE or SENOKOT S) 8.6-50 MG per tablet 2 Tab  2 Tab BID Dmitry Pond M.D.   2 Tab at 18 0957    And   • polyethylene glycol/lytes (MIRALAX) PACKET 1 Packet  1 Packet QDAY PRN Dmitry Pond M.D.        And   • magnesium hydroxide (MILK OF MAGNESIA) suspension 30 mL  30 mL QDAY PRN Dmitry Pond M.D.        And   • bisacodyl (DULCOLAX) suppository 10 mg  10 mg QDAY PRN Dmitry Pond M.D.       • Respiratory Care per Protocol   Continuous RT Dmitry Pond M.D.       • ondansetron (ZOFRAN  ODT) dispertab 4 mg  4 mg Q4HRS PRN Dmitry Pond M.D.       • promethazine (PHENERGAN) tablet 12.5-25 mg  12.5-25 mg Q4HRS PRN Dmitry Pond M.D.       • promethazine (PHENERGAN) suppository 12.5-25 mg  12.5-25 mg Q4HRS PRN Dmitry Pond M.D.       • prochlorperazine (COMPAZINE) injection 5-10 mg  5-10 mg Q4HRS PRN Dmitry Pond M.D.       • nicotine (NICODERM) 14 MG/24HR 14 mg  14 mg Daily-0600 Dmitry Pond M.D.   14 mg at 05/31/18 0540    And   • nicotine polacrilex (NICORETTE) 2 MG piece 2 mg  2 mg Q HOUR PRN Dmitry Pond M.D.       • acetaminophen (TYLENOL) tablet 650 mg  650 mg Q6HRS PRN Dmitry Pond M.D.   650 mg at 05/30/18 2241   • meclizine (ANTIVERT) tablet 25 mg  25 mg TID PRN Dmitry Pond M.D.         Last reviewed on 5/28/2018 10:53 AM by Paris Ramírez R.N.    Quality  Measures:  Labs reviewed, Medications reviewed and Radiology images reviewed  Dc catheter: No Dc      DVT Prophylaxis: Contraindicated - High bleeding risk  DVT prophylaxis - mechanical: SCDs  Ulcer prophylaxis: Not indicated          Assessment and Plan:    Acute ischemic left cerebellar infarction              -No evidence of her vertebral artery dissection on CT angiography              -Continue aspirin, high intensity statin              -Frequent neurologic checks              -High risk of developing malignant edema in the posterior fossa due to size of infarction     Dyslipidemia              -Continue statin     History of GERD    Hypomagnesemia   -Replete magnesium      Discussed with RN, RT, clinical pharmacist, team, hospitalist

## 2018-06-01 LAB
ANION GAP SERPL CALC-SCNC: 6 MMOL/L (ref 0–11.9)
BASOPHILS # BLD AUTO: 0.6 % (ref 0–1.8)
BASOPHILS # BLD: 0.07 K/UL (ref 0–0.12)
BUN SERPL-MCNC: 9 MG/DL (ref 8–22)
CALCIUM SERPL-MCNC: 8.3 MG/DL (ref 8.5–10.5)
CHLORIDE SERPL-SCNC: 108 MMOL/L (ref 96–112)
CO2 SERPL-SCNC: 21 MMOL/L (ref 20–33)
CREAT SERPL-MCNC: 0.7 MG/DL (ref 0.5–1.4)
EOSINOPHIL # BLD AUTO: 0.32 K/UL (ref 0–0.51)
EOSINOPHIL NFR BLD: 2.7 % (ref 0–6.9)
ERYTHROCYTE [DISTWIDTH] IN BLOOD BY AUTOMATED COUNT: 40.4 FL (ref 35.9–50)
GLUCOSE SERPL-MCNC: 97 MG/DL (ref 65–99)
HCT VFR BLD AUTO: 41.5 % (ref 37–47)
HGB BLD-MCNC: 14.1 G/DL (ref 12–16)
IMM GRANULOCYTES # BLD AUTO: 0.04 K/UL (ref 0–0.11)
IMM GRANULOCYTES NFR BLD AUTO: 0.3 % (ref 0–0.9)
LYMPHOCYTES # BLD AUTO: 3.49 K/UL (ref 1–4.8)
LYMPHOCYTES NFR BLD: 29.7 % (ref 22–41)
MAGNESIUM SERPL-MCNC: 1.8 MG/DL (ref 1.5–2.5)
MCH RBC QN AUTO: 30.8 PG (ref 27–33)
MCHC RBC AUTO-ENTMCNC: 34 G/DL (ref 33.6–35)
MCV RBC AUTO: 90.6 FL (ref 81.4–97.8)
MONOCYTES # BLD AUTO: 0.89 K/UL (ref 0–0.85)
MONOCYTES NFR BLD AUTO: 7.6 % (ref 0–13.4)
NEUTROPHILS # BLD AUTO: 6.93 K/UL (ref 2–7.15)
NEUTROPHILS NFR BLD: 59.1 % (ref 44–72)
NRBC # BLD AUTO: 0 K/UL
NRBC BLD-RTO: 0 /100 WBC
PLATELET # BLD AUTO: 215 K/UL (ref 164–446)
PMV BLD AUTO: 10.4 FL (ref 9–12.9)
POTASSIUM SERPL-SCNC: 4.1 MMOL/L (ref 3.6–5.5)
RBC # BLD AUTO: 4.58 M/UL (ref 4.2–5.4)
SODIUM SERPL-SCNC: 135 MMOL/L (ref 135–145)
TSH SERPL DL<=0.005 MIU/L-ACNC: 2.58 UIU/ML (ref 0.38–5.33)
WBC # BLD AUTO: 11.7 K/UL (ref 4.8–10.8)

## 2018-06-01 PROCEDURE — 93312 ECHO TRANSESOPHAGEAL: CPT

## 2018-06-01 PROCEDURE — 80048 BASIC METABOLIC PNL TOTAL CA: CPT

## 2018-06-01 PROCEDURE — 84443 ASSAY THYROID STIM HORMONE: CPT

## 2018-06-01 PROCEDURE — 770022 HCHG ROOM/CARE - ICU (200)

## 2018-06-01 PROCEDURE — 700111 HCHG RX REV CODE 636 W/ 250 OVERRIDE (IP): Performed by: PSYCHIATRY & NEUROLOGY

## 2018-06-01 PROCEDURE — 97116 GAIT TRAINING THERAPY: CPT

## 2018-06-01 PROCEDURE — 99152 MOD SED SAME PHYS/QHP 5/>YRS: CPT

## 2018-06-01 PROCEDURE — 85025 COMPLETE CBC W/AUTO DIFF WBC: CPT

## 2018-06-01 PROCEDURE — 93325 DOPPLER ECHO COLOR FLOW MAPG: CPT

## 2018-06-01 PROCEDURE — 700111 HCHG RX REV CODE 636 W/ 250 OVERRIDE (IP)

## 2018-06-01 PROCEDURE — A9270 NON-COVERED ITEM OR SERVICE: HCPCS | Performed by: INTERNAL MEDICINE

## 2018-06-01 PROCEDURE — 700102 HCHG RX REV CODE 250 W/ 637 OVERRIDE(OP): Performed by: INTERNAL MEDICINE

## 2018-06-01 PROCEDURE — 99233 SBSQ HOSP IP/OBS HIGH 50: CPT | Performed by: INTERNAL MEDICINE

## 2018-06-01 PROCEDURE — A9270 NON-COVERED ITEM OR SERVICE: HCPCS | Performed by: HOSPITALIST

## 2018-06-01 PROCEDURE — 83735 ASSAY OF MAGNESIUM: CPT

## 2018-06-01 PROCEDURE — 97530 THERAPEUTIC ACTIVITIES: CPT

## 2018-06-01 PROCEDURE — 700102 HCHG RX REV CODE 250 W/ 637 OVERRIDE(OP): Performed by: HOSPITALIST

## 2018-06-01 PROCEDURE — 99232 SBSQ HOSP IP/OBS MODERATE 35: CPT | Performed by: HOSPITALIST

## 2018-06-01 RX ORDER — DEXAMETHASONE SODIUM PHOSPHATE 4 MG/ML
4 INJECTION, SOLUTION INTRA-ARTICULAR; INTRALESIONAL; INTRAMUSCULAR; INTRAVENOUS; SOFT TISSUE ONCE
Status: COMPLETED | OUTPATIENT
Start: 2018-06-01 | End: 2018-06-01

## 2018-06-01 RX ORDER — MIDAZOLAM HYDROCHLORIDE 1 MG/ML
INJECTION INTRAMUSCULAR; INTRAVENOUS
Status: COMPLETED
Start: 2018-06-01 | End: 2018-06-01

## 2018-06-01 RX ORDER — MIDAZOLAM HYDROCHLORIDE 1 MG/ML
1-5 INJECTION INTRAMUSCULAR; INTRAVENOUS ONCE
Status: COMPLETED | OUTPATIENT
Start: 2018-06-01 | End: 2018-06-01

## 2018-06-01 RX ADMIN — FENTANYL CITRATE 50 MCG: 50 INJECTION, SOLUTION INTRAMUSCULAR; INTRAVENOUS at 13:21

## 2018-06-01 RX ADMIN — ATORVASTATIN CALCIUM 80 MG: 40 TABLET, FILM COATED ORAL at 20:07

## 2018-06-01 RX ADMIN — MIDAZOLAM 2 MG: 1 INJECTION INTRAMUSCULAR; INTRAVENOUS at 13:21

## 2018-06-01 RX ADMIN — NICOTINE 14 MG: 14 PATCH, EXTENDED RELEASE TRANSDERMAL at 05:18

## 2018-06-01 RX ADMIN — MIDAZOLAM HYDROCHLORIDE 2 MG: 1 INJECTION INTRAMUSCULAR; INTRAVENOUS at 13:21

## 2018-06-01 RX ADMIN — STANDARDIZED SENNA CONCENTRATE AND DOCUSATE SODIUM 2 TABLET: 8.6; 5 TABLET, FILM COATED ORAL at 14:59

## 2018-06-01 RX ADMIN — DEXAMETHASONE SODIUM PHOSPHATE 4 MG: 4 INJECTION, SOLUTION INTRAMUSCULAR; INTRAVENOUS at 11:35

## 2018-06-01 RX ADMIN — ASPIRIN 325 MG: 325 TABLET ORAL at 14:59

## 2018-06-01 ASSESSMENT — PAIN SCALES - GENERAL
PAINLEVEL_OUTOF10: 0

## 2018-06-01 ASSESSMENT — ENCOUNTER SYMPTOMS
SEIZURES: 0
DEPRESSION: 0
DOUBLE VISION: 0
CLAUDICATION: 0
SHORTNESS OF BREATH: 0
TREMORS: 0
DIARRHEA: 0
HEADACHES: 1
EYE PAIN: 0
DIZZINESS: 1
DIAPHORESIS: 0
BACK PAIN: 0
HEMOPTYSIS: 0
SPUTUM PRODUCTION: 0
WEAKNESS: 0
FEVER: 0
TINGLING: 0
CONSTIPATION: 0
SINUS PAIN: 0
NERVOUS/ANXIOUS: 0
POLYDIPSIA: 0
SPEECH CHANGE: 0
CHILLS: 0
ABDOMINAL PAIN: 0
NECK PAIN: 0
LOSS OF CONSCIOUSNESS: 0
ORTHOPNEA: 0
NAUSEA: 1
PHOTOPHOBIA: 0
MYALGIAS: 0
BLURRED VISION: 0
BRUISES/BLEEDS EASILY: 0
VOMITING: 0
COUGH: 0
PALPITATIONS: 0

## 2018-06-01 ASSESSMENT — GAIT ASSESSMENTS
GAIT LEVEL OF ASSIST: CONTACT GUARD ASSIST
ASSISTIVE DEVICE: FRONT WHEEL WALKER
DISTANCE (FEET): 350
DEVIATION: BRADYKINETIC

## 2018-06-01 NOTE — PROGRESS NOTES
"S- head still hurts, not worse.  No other complaints.     O-   Allergies:   Allergies   Allergen Reactions   • Bee Anaphylaxis   • Augmentin Rash     Pt states \"I get a bad rash\".     • Latex Rash     Pt states \"I get a bad rash\".   • Neosporin Lip Health Hives     Blisters and swelling at site   • Other Misc Rash     Nail acrylic- Pt states \"I get a bad rash\".   • Sulfa Drugs Hives, Itching and Swelling   • Tape Rash     Pt states \"I get a bad rash\".  Paper tape ok         Current Facility-Administered Medications:   •  atorvastatin (LIPITOR) tablet 80 mg, 80 mg, Oral, QHS, Sergio Villanueva M.D., 80 mg at 05/31/18 2016  •  aspirin (ASA) tablet 325 mg, 325 mg, Oral, DAILY, Sergio Villanueva M.D., 325 mg at 05/31/18 0819  •  ondansetron (ZOFRAN) syringe/vial injection 4 mg, 4 mg, Intravenous, Q4HRS PRN, Keyon Gibbs M.D., 4 mg at 05/30/18 0959  •  HYDROcodone-acetaminophen (NORCO) 5-325 MG per tablet 1-2 Tab, 1-2 Tab, Oral, Q6HRS PRN, Keyon Gibbs M.D., 1 Tab at 05/31/18 0606  •  senna-docusate (PERICOLACE or SENOKOT S) 8.6-50 MG per tablet 2 Tab, 2 Tab, Oral, BID, 2 Tab at 05/31/18 0819 **AND** polyethylene glycol/lytes (MIRALAX) PACKET 1 Packet, 1 Packet, Oral, QDAY PRN **AND** magnesium hydroxide (MILK OF MAGNESIA) suspension 30 mL, 30 mL, Oral, QDAY PRN **AND** bisacodyl (DULCOLAX) suppository 10 mg, 10 mg, Rectal, QDAY PRN, Dmitry Pond M.D.  •  Respiratory Care per Protocol, , Nebulization, Continuous RT, Dmitry Pond M.D.  •  ondansetron (ZOFRAN ODT) dispertab 4 mg, 4 mg, Oral, Q4HRS PRN, Dmitry Pond M.D.  •  promethazine (PHENERGAN) tablet 12.5-25 mg, 12.5-25 mg, Oral, Q4HRS PRN, Dmitry Pond M.D.  •  promethazine (PHENERGAN) suppository 12.5-25 mg, 12.5-25 mg, Rectal, Q4HRS PRN, Dmitry Pond M.D.  •  prochlorperazine (COMPAZINE) injection 5-10 mg, 5-10 mg, Intravenous, Q4HRS PRN, Dmitry Pond M.D.  •  nicotine (NICODERM) 14 MG/24HR 14 mg, 14 mg, Transdermal, Daily-0600, 14 mg at " 06/01/18 0518 **AND** Nicotine Replacement Patient Education Materials, , , Once **AND** nicotine polacrilex (NICORETTE) 2 MG piece 2 mg, 2 mg, Oral, Q HOUR PRN, Dmitry Pond M.D.  •  acetaminophen (TYLENOL) tablet 650 mg, 650 mg, Oral, Q6HRS PRN, Dmitry Pond M.D., 650 mg at 05/30/18 2241  •  meclizine (ANTIVERT) tablet 25 mg, 25 mg, Oral, TID PRN, Dmitry Pond M.D.      PHYSICAL EXAM    Vitals:    06/01/18 0500 06/01/18 0600 06/01/18 0700 06/01/18 0800   BP:       Pulse: 77 74 78 85   Resp: 15 13 14 (!) 22   Temp:  36.6 °C (97.8 °F)  36.1 °C (97 °F)   SpO2: 94% 96% 94% 97%   Weight:       Height:           Head/Neck: NCAT no meningismus neg kernig neg brudzinski. No obvious mass or heard bruit. No tender arteries or lost pulses.  No rash of head or neck.  Skin: Warm, dry, intact. No rashes observed head/neck or body  Eyes/Funduscopic: Optic discs flat with no evidence of papilledema or pallor. Normal posterior segments.     Mental Status: Awake, alert, oriented x 3. Names/repeats/fluent/follows commands. No neglect/extinction. Attention and concentration, recent & remote memory, Fund of Knowledge wnl.  Cranial Nerves: CN II-XII intact. PERRL.   No afferent pupillary defect. EOM full. VF full. sym grimace & eye closure. FFA nystagmus TRACE, SKEW DEV.                           Motor: strength/bulk/tone wnl.  intact and sym, no abn mvmts          Sensory: Intact light touch & sharp ON LEFT, EVEN LESS ON RIGHT SUBTLE ARM AND LEG  Coordination: finger to nose & heel to shin intact.   DTR's: intact/sym. no clonus. Toes mute.  Gait/Station: n/a fall concern. AXIAL TITUBATION/ATAXIA TRUNCAL                NIHSS: 1      Labs:  Recent Labs      05/30/18   0530  05/31/18   0604  06/01/18   0504   WBC  10.3  10.4  11.7*   RBC  4.42  4.32  4.58   HEMOGLOBIN  13.7  13.3  14.1   HEMATOCRIT  39.6  39.7  41.5   MCV  89.6  91.9  90.6   MCH  31.0  30.8  30.8   MCHC  34.6  33.5*  34.0   RDW  39.9  40.5  40.4   PLATELETCT  193  193   215   MPV  10.5  10.6  10.4     Recent Labs      05/30/18   0530  05/31/18   0604  06/01/18   0504   SODIUM  136  136  135   POTASSIUM  3.6  3.9  4.1   CHLORIDE  109  110  108   CO2  21  19*  21   GLUCOSE  90  92  97   BUN  8  10  9   CREATININE  0.62  0.62  0.70   CALCIUM  8.2*  8.1*  8.3*                 Recent Labs      05/30/18   0530   TRIGLYCERIDE  139   HDL  33*   LDL  152*     Recent Labs      05/30/18   0530  05/31/18   0604  06/01/18   0504   SODIUM  136  136  135   POTASSIUM  3.6  3.9  4.1   CHLORIDE  109  110  108   CO2  21  19*  21   GLUCOSE  90  92  97   BUN  8  10  9     Recent Labs      05/30/18   0530  05/31/18   0604  06/01/18   0504   SODIUM  136  136  135   POTASSIUM  3.6  3.9  4.1   CHLORIDE  109  110  108   CO2  21  19*  21   BUN  8  10  9   CREATININE  0.62  0.62  0.70   MAGNESIUM  1.7  1.8  1.8   CALCIUM  8.2*  8.1*  8.3*         Results for orders placed or performed during the hospital encounter of 05/28/18   Echocardiogram Comp W/O cont   Result Value Ref Range    Eject.Frac. MOD BP 67.7     Eject.Frac. MOD 4C 67.53     Eject.Frac. MOD 2C 75.93     Left Ventrical Ejection Fraction 65               Imaging: neuroimaging reviewed and directly visualized by me  LE VENOUS DUPLEX (DVT)   Final Result      Echocardiogram Comp W/O cont   Final Result      CT-CTA NECK WITH & W/O-POST PROCESSING   Final Result   Addendum 1 of 1   Addendum: There is a left PICA occlusion consistent with the left    cerebellar infarct.      Final      1.  There is no evidence of vertebral artery dissection.   2.  There is normal anatomic variant left vertebral artery originating from the aortic arch.   3.  There is estimated less than 50% stenosis at the left vertebral artery origin at the arch likely due to atherosclerosis.   4.  Carotid arteries are unremarkable.      CT-CTA HEAD WITH & W/O-POST PROCESS   Final Result   Addendum 1 of 1   Addendum: The left PICA is occluded corresponding to the area of evolving     infarction.      Final      No thrombosis is seen within the [place].      1.7 mm outpouching involving the supraclinoid ICA on the left likely represents an infundibulum. Tiny Aneurysm not excluded.      Dominant right vertebral artery.      Evolving left cerebellar infarction.         MR-BRAIN-W/O   Final Result      1.  Acute left cerebellar infarct in the PICA territory.   2.  Suspicion for left vertebral artery dissection. CTA of the head and neck may be of interest as there is a possible dissection flap.   3.  Subcentimeter focus of nonspecific FLAIR hyperintensity at the right parietal high convexity. Possible old infarct or other remote insult. No restricted diffusion to indicate acute cortical infarct.   4.  Nonspecific punctate focus of FLAIR hyperintensity in the left posterior frontal subcortical white matter. Most likely small vessel ischemic change versus demyelination or gliosis.   5.  No evidence of acute hemorrhage or mass lesion.      CT-ABDOMEN-PELVIS WITH   Final Result         1.  No acute abnormality.   2.  5 mm low-density lesion in the dome of the liver, stable since prior study, could represent small cyst or hemangioma, otherwise indeterminate and too small to definitively characterize.   3.  Bilateral ovarian cysts, greater on the right, decreased in size on the right compared to prior study.   4.  Atherosclerosis      DX-CHEST-PORTABLE (1 VIEW)   Final Result         1.  No focal infiltrates.   2.  Perihilar interstitial prominence and bronchial wall cuffing, appearance suggests changes of underlying bronchial inflammation, consider bronchitis.      TRANSESOPHAGEAL ECHO W/ CONT    (Results Pending)       Assessment/Plan:    L PICA ACUTE ISCHEMIC INFARCT, LARGE VOLUME, 2' TO SUSPECTED CARDIOEMBOLUS WITH PFO FOUND VS L PROX VERT ATHEROEMBOLUS     SUSPECT SMALL LEFT THALAMIC INFARCT CAUSING RIGHT SENSORY LOSS, MISSED ON MRI CUTS     INCIDENTAL RARE SMALL BILATERAL JUXTACORTICAL T2  HYPERINTENSITIES NONSPECIFIC NONACUTE, LIKELY ISCHEMIC/MICROVASCULAR    - CARD DANICA WITH PFO ON TTE, closure should be considered especially with large stroke volume in posterior fossa makes anticoagulation risky if DVT found, must r/o DVT. If no PFO closure & no DVT, then full dose aspirin.     - ESTER EVAL: SMALL ANEU SUPRACLINOID ICA LEFT? INCIDENTAL. LEFT VERT ORIGIN STENOSIS <50% MAY BE SOURCE OF PICA STROKE, QUESTION NEED FOR DSA    - NO ANTICOAGULATION FOR 2WKS WITH LARGE CEREBELLAR STROKE HIGH ICH RISK WOULD NEED RPT NEUROIMAGING PRIOR TO START AND NEURO MD OK    - MAX DOSE LIPITOR OR CRESTOR    - PRN LOW DOSE VALIUM PRN VERTIGO    - aim for 6/4-5 discharge if remains stable    No further neuro workup as inpatient if maintains neuro baseline.  Neuro sign off, reconsult PRN.  F/u otpt Neuro ASAP.       Vicente Turner M.D.  , Neurohospitalist & Stroke  Clinical Professor, [employer]  Diplomate, Neurology & Neuroimaging

## 2018-06-01 NOTE — CARE PLAN
Problem: Infection  Goal: Will remain free from infection  Outcome: PROGRESSING AS EXPECTED  Assess for s/s of infection. Implement standard precautions and perform handwashing before and after pt contact.     Problem: Fluid Volume:  Goal: Will maintain balanced intake and output  Outcome: PROGRESSING AS EXPECTED

## 2018-06-01 NOTE — PROGRESS NOTES
Pulmonary Critical Care Progress Note      Date of service:  6/1/2018    Chief Complaint:   Dizziness, nausea    Interval Events:  24 hour interval history reviewed  Reason for visit:  Acute cerebellar stroke       - mild HA   - q2 hour neuro checks   - ambulates with walker   - SR   - PFO   - DANICA today      She has some dizziness when she stands and walks.  She has some nausea but no vomiting.  She denies abdominal pain.  She has no angina, palpitations or syncope.  She has no cough, sputum production or hemoptysis.  She denies any dyspnea.  Her headache is improved.      Review of Systems   Constitutional: Negative for chills, diaphoresis and fever.   HENT: Negative for congestion, ear discharge, ear pain, hearing loss, nosebleeds and sinus pain.    Eyes: Negative for blurred vision, double vision, photophobia and pain.   Respiratory: Negative for cough, hemoptysis, sputum production and shortness of breath.    Cardiovascular: Negative for chest pain, palpitations, orthopnea and claudication.   Gastrointestinal: Positive for nausea. Negative for abdominal pain, constipation, diarrhea and vomiting.   Genitourinary: Negative for dysuria, frequency and urgency.   Musculoskeletal: Negative for back pain, myalgias and neck pain.   Skin: Negative for itching and rash.   Neurological: Positive for dizziness. Negative for speech change, seizures and loss of consciousness.   Endo/Heme/Allergies: Negative for environmental allergies and polydipsia. Does not bruise/bleed easily.   Psychiatric/Behavioral: Negative for depression and suicidal ideas. The patient is not nervous/anxious.        Physical Exam   Constitutional: She is oriented to person, place, and time and well-developed, well-nourished, and in no distress. No distress.   HENT:   Head: Normocephalic and atraumatic.   Right Ear: External ear normal.   Left Ear: External ear normal.   Nose: Nose normal.   Mouth/Throat: Oropharynx is clear and moist.   Eyes:  Conjunctivae and EOM are normal. Pupils are equal, round, and reactive to light. Right eye exhibits no discharge. Left eye exhibits no discharge. No scleral icterus.   Neck: Normal range of motion. Neck supple. No JVD present. No tracheal deviation present.   Cardiovascular: Intact distal pulses.  Exam reveals no gallop and no friction rub.    Sinus rhythm   Pulmonary/Chest: Effort normal and breath sounds normal. No stridor. No respiratory distress. She has no wheezes. She has no rales.   Abdominal: Soft. Bowel sounds are normal. She exhibits no distension. There is no tenderness. There is no rebound.   Musculoskeletal: She exhibits no tenderness or deformity.   No clubbing or cyanosis   Lymphadenopathy:     She has no cervical adenopathy.   Neurological: She is alert and oriented to person, place, and time. No cranial nerve deficit. Coordination abnormal. GCS score is 15.   Skin: Skin is warm and dry. No rash noted. She is not diaphoretic. No erythema. No pallor.   Psychiatric: Memory and affect normal.       PFSH:  No change.    Respiratory:     Pulse Oximetry: 96 %    HemoDynamics:  Pulse: 74, Heart Rate (Monitored): 73  NIBP: 105/68       Neuro:    Fluids:  Intake/Output       05/30/18 0700 - 05/31/18 0659 05/31/18 0700 - 06/01/18 0659 06/01/18 0700 - 06/02/18 0659      0560-2956 8300-0193 Total 0379-2595 3793-6863 Total 3643-9227 2669-1020 Total       Intake    P.O.  650  600 1250  1070  150 1220  --  -- --    P.O.  2963 547 6961 -- -- --    I.V.  --  996 996  996  996 1992  --  -- --    NS + K -- 996 996  -- -- --    Total Intake 650 1596 2246 2066 1146 3212 -- -- --       Output    Urine  1250  1025 2275  1650  800 2450  --  -- --    Number of Times Voided 3 x 3 x 6 x 2 x 3 x 5 x -- -- --    Urine Void (mL) (non-catheter) 1250 1025 2275 6717 903 1387 -- -- --    Stool  --  0 0  --  0 0  --  -- --    Number of Times Stooled -- 0 x 0 x 1 x 0 x 1 x -- -- --    Measurable Stool (mL) -- 0  0 -- 0 0 -- -- --    Total Output 1250 1025 2275 8976 098 4854 -- -- --       Net I/O     -600 571 -29 416 346 762 -- -- --        Weight: 107.8 kg (237 lb 10.5 oz)  Recent Labs      18   0504   SODIUM  136  136  135   POTASSIUM  3.6  3.9  4.1   CHLORIDE  109  110  108   CO2  21  19*  21   BUN  8  10  9   CREATININE  0.62  0.62  0.70   MAGNESIUM  1.7  1.8  1.8   CALCIUM  8.2*  8.1*  8.3*       GI/Nutrition:    Liver Function  Recent Labs      18   0504   GLUCOSE  90  92  97       Heme:  Recent Labs      18   0504   RBC  4.42  4.32  4.58   HEMOGLOBIN  13.7  13.3  14.1   HEMATOCRIT  39.6  39.7  41.5   PLATELETCT  193  193  215       Infectious Disease:  Temp  Av.5 °C (97.7 °F)  Min: 35.9 °C (96.6 °F)  Max: 37 °C (98.6 °F)    Recent Labs      18   0504   WBC  10.3  10.4  11.7*   NEUTSPOLYS  45.60  51.80  59.10   LYMPHOCYTES  43.00*  35.50  29.70   MONOCYTES  8.10  8.70  7.60   EOSINOPHILS  2.40  2.70  2.70   BASOPHILS  0.40  0.70  0.60     Current Facility-Administered Medications   Medication Dose Frequency Provider Last Rate Last Dose   • atorvastatin (LIPITOR) tablet 80 mg  80 mg QHS Sergio Villanueva M.D.   80 mg at 18   • aspirin (ASA) tablet 325 mg  325 mg DAILY Sergio Villanueva M.D.   325 mg at 18 08   • ondansetron (ZOFRAN) syringe/vial injection 4 mg  4 mg Q4HRS PRN Keyon Gibbs M.D.   4 mg at 18 0959   • HYDROcodone-acetaminophen (NORCO) 5-325 MG per tablet 1-2 Tab  1-2 Tab Q6HRS PRN Keyon Gibbs M.D.   1 Tab at 18 0606   • senna-docusate (PERICOLACE or SENOKOT S) 8.6-50 MG per tablet 2 Tab  2 Tab BID Dmitry Pond M.D.   2 Tab at 18 0819    And   • polyethylene glycol/lytes (MIRALAX) PACKET 1 Packet  1 Packet QDAY PRN Dmitry Pond M.D.        And   • magnesium hydroxide (MILK OF  MAGNESIA) suspension 30 mL  30 mL QDAY PRN Dmitry Pond M.D.        And   • bisacodyl (DULCOLAX) suppository 10 mg  10 mg QDAY PRN Dmitry Pond M.D.       • Respiratory Care per Protocol   Continuous RT Dmitry Pond M.D.       • ondansetron (ZOFRAN ODT) dispertab 4 mg  4 mg Q4HRS PRN Dmitry Pond M.D.       • promethazine (PHENERGAN) tablet 12.5-25 mg  12.5-25 mg Q4HRS PRN Dmitry Pond M.D.       • promethazine (PHENERGAN) suppository 12.5-25 mg  12.5-25 mg Q4HRS PRN Dmitry Pond M.D.       • prochlorperazine (COMPAZINE) injection 5-10 mg  5-10 mg Q4HRS PRN Dmitry Pond M.D.       • nicotine (NICODERM) 14 MG/24HR 14 mg  14 mg Daily-0600 Dmitry Pond M.D.   14 mg at 06/01/18 0518    And   • nicotine polacrilex (NICORETTE) 2 MG piece 2 mg  2 mg Q HOUR PRN Dmitry Pond M.D.       • acetaminophen (TYLENOL) tablet 650 mg  650 mg Q6HRS PRN Dmitry Pond M.D.   650 mg at 05/30/18 2241   • meclizine (ANTIVERT) tablet 25 mg  25 mg TID PRN Dmitry Pond M.D.         Last reviewed on 5/28/2018 10:53 AM by Paris Ramírez R.N.    Quality  Measures:  Labs reviewed, Medications reviewed and Radiology images reviewed  Dc catheter: No Dc      DVT Prophylaxis: Contraindicated - High bleeding risk  DVT prophylaxis - mechanical: SCDs  Ulcer prophylaxis: Not indicated          Assessment and Plan:    Acute ischemic left cerebellar infarction              -No evidence of her vertebral artery dissection on CT angiography              -Continue aspirin, high intensity statin              -Frequent neurologic checks              -High risk of developing malignant edema in the posterior fossa due to size of infarction    Positive bubble study on TTE -query PFO   -DANICA today     Dyslipidemia              -Continue statin     History of GERD      OK to transfer out of ICU.  [facility] will sign off on transfer.  Please call if you have any questions.    Discussed with RN, RT, clinical pharmacist, team, hospitalist

## 2018-06-01 NOTE — PROGRESS NOTES
[facility] Hospital[facility] Progress Note    Date of Service: 2018    Chief Complaint  42 y.o. female admitted 2018 with sudden onset of nausea, vomiting, vertigo and headache.    Ms. Martinez has a history of hyperlipidemia.  She presented 2018 with acute onset of symptoms which included nausea, vomiting, vertigo.  Workup in the emergency room demonstrated that she had a leukocytosis and lactic acidosis.  The admitting physician ordered an MRI of the brain to evaluate for acute onset of new vertigo.  MRI on 2018 demonstrated acute left cerebellar infarct in the PICA territory.  There is also some concern on the MRI for left vertebral artery dissection.  A follow-up CT scan of the head and neck did not show evidence of vertebral artery dissection.  Due to the size of the stroke, patient was moved to the ICU for close monitoring as she was at risk for posterior fossa edema.  Additional stroke workup included an echocardiogram which demonstrated a PFO.  DANICA on 2018 confirmed PFO.    Interval Problem Update  Mild headache, better than yesterday, not positional  No longer nauseated, n.p.o. for procedure this morning but is otherwise tolerated regular diet  Feels off when she stands up to walk, gait is relatively steady  No blurry vision    Consultants/Specialty  Neurology  Radiology  Interventional radiology  Critical care    Disposition  Keep in ICU for serial neuro checks      Review of Systems   Constitutional: Negative for malaise/fatigue.   Respiratory: Negative for cough and hemoptysis.    Cardiovascular: Negative for palpitations and orthopnea.   Skin: Negative for itching and rash.   Neurological: Positive for dizziness and headaches. Negative for tingling, tremors and weakness.      Physical Exam  Laboratory/Imaging   Hemodynamics  Temp (24hrs), Av.6 °C (97.9 °F), Min:36.1 °C (97 °F), Max:37 °C (98.6 °F)   Temperature: 36.6 °C (97.9 °F)  Pulse  Av.9  Min: 58  Max: 109 Heart Rate  (Monitored): 90  Blood Pressure: 139/94, NIBP: 112/79      Respiratory      Respiration: (!) 22, Pulse Oximetry: 95 %     Work Of Breathing / Effort: Mild  RUL Breath Sounds: Clear, RML Breath Sounds: Clear, RLL Breath Sounds: Diminished, ANDREW Breath Sounds: Clear, LLL Breath Sounds: Diminished    Fluids    Intake/Output Summary (Last 24 hours) at 06/01/18 1657  Last data filed at 06/01/18 0600   Gross per 24 hour   Intake             1312 ml   Output              800 ml   Net              512 ml       Nutrition  Orders Placed This Encounter   Procedures   • DIET ORDER     Standing Status:   Standing     Number of Occurrences:   1     Order Specific Question:   Diet:     Answer:   Regular [1]     Physical Exam   Constitutional: She is oriented to person, place, and time. She appears well-developed and well-nourished.   HENT:   Head: Normocephalic and atraumatic.   Eyes: Conjunctivae and EOM are normal. Pupils are equal, round, and reactive to light. Right eye exhibits no discharge. Left eye exhibits no discharge. No scleral icterus.   Neck: Normal range of motion. Neck supple. No tracheal deviation present. No thyromegaly present.   Cardiovascular: Normal rate, regular rhythm and normal heart sounds.  Exam reveals no gallop and no friction rub.    Pulses:       Dorsalis pedis pulses are 2+ on the right side, and 2+ on the left side.   Pulmonary/Chest: Effort normal and breath sounds normal. She has no wheezes. She has no rales.   Abdominal: Soft. Bowel sounds are normal. She exhibits no distension and no mass. There is no tenderness.   Musculoskeletal: Normal range of motion. She exhibits no edema or tenderness.   Neurological: She is alert and oriented to person, place, and time. She displays normal reflexes. No cranial nerve deficit or sensory deficit. Coordination abnormal.   Skin: Skin is warm and dry. No rash noted. She is not diaphoretic. No cyanosis.   Psychiatric: Her behavior is normal. Her mood appears  anxious. Her speech is rapid and/or pressured.   Nursing note and vitals reviewed.      Recent Labs      05/30/18   0530  05/31/18   0604  06/01/18   0504   WBC  10.3  10.4  11.7*   RBC  4.42  4.32  4.58   HEMOGLOBIN  13.7  13.3  14.1   HEMATOCRIT  39.6  39.7  41.5   MCV  89.6  91.9  90.6   MCH  31.0  30.8  30.8   MCHC  34.6  33.5*  34.0   RDW  39.9  40.5  40.4   PLATELETCT  193  193  215   MPV  10.5  10.6  10.4     Recent Labs      05/30/18   0530  05/31/18   0604  06/01/18   0504   SODIUM  136  136  135   POTASSIUM  3.6  3.9  4.1   CHLORIDE  109  110  108   CO2  21  19*  21   GLUCOSE  90  92  97   BUN  8  10  9   CREATININE  0.62  0.62  0.70   CALCIUM  8.2*  8.1*  8.3*             Recent Labs      05/30/18   0530   TRIGLYCERIDE  139   HDL  33*   LDL  152*          Assessment/Plan     * CVA (cerebral vascular accident) (HCC)- (present on admission)   Assessment & Plan    Left cerebellar  Posterior circulation distribution  CTA does not demonstrate vertebral artery dissection  Patient does have a PFO, this was confirmed with transesophageal echocardiogram today  No DVT on duplex ultrasound 5/30/2018  Continue high-dose statin and antiplatelet therapy with aspirin        PFO (patent foramen ovale)- (present on admission)   Assessment & Plan    DANICA confirms PFO, consider closure  Cardiology is following        Headache- (present on admission)   Assessment & Plan    From acute CVA        Lactic acidosis- (present on admission)   Assessment & Plan    Suspect secondary to dehydration rather than septic process        Vertigo- (present on admission)   Assessment & Plan    Secondary to acute CVA  This is improved  Neurology recommends Valium for recurrence of symptoms        Leukocytosis- (present on admission)   Assessment & Plan    Possibly reactive          Tobacco dependence- (present on admission)   Assessment & Plan    Cessation recommended            Quality-Core Measures   Reviewed items::  Labs reviewed and  Medications reviewed  Dc catheter::  No Dc  DVT prophylaxis pharmacological::  Contraindicated - High bleeding risk  DVT prophylaxis - mechanical:  SCDs  Assessed for rehabilitation services:  Patient was assess for and/or received rehabilitation services during this hospitalization

## 2018-06-01 NOTE — CARE PLAN
Problem: Bowel/Gastric:  Goal: Will not experience complications related to bowel motility  Outcome: PROGRESSING AS EXPECTED  RN to inquire pt's normal bowel habits and patterns per pt's baseline. RN to educate pt on use of bowel medications such as senna to help pt maintain normal bowel function during hospital stay. RN to encourage an increase in activity with adequate rest periods in addition to increase of fluid intake to help promote bowel elimination. Pt states she has been stooling per normal habits at this time.     Problem: Respiratory:  Goal: Respiratory status will improve    Intervention: Educate and encourage incentive spirometry usage  RN to continue to encourage incentive spirometer usage during hospital stay. RN to inform pt of 10x per hour gold standard to help reduce risk of pneumonia and to encourage pulmonary toileting. RN to offer usage with scheduled roundings and create realistic goals and positive reinforcement to motivate pt.

## 2018-06-01 NOTE — CARE PLAN
Problem: Safety  Goal: Will remain free from injury  Outcome: PROGRESSING AS EXPECTED  Provide assistance with mobility. Safe transfer and mobility techniques

## 2018-06-01 NOTE — THERAPY
"Physical Therapy Treatment completed.   Bed Mobility:  Supine to Sit: Modified Independent  Transfers: Sit to Stand: Supervised  Gait: Level Of Assist: Contact Guard Assist with Front-Wheel Walker       Plan of Care: Will benefit from Physical Therapy 5 times per week  Discharge Recommendations: Equipment: Will Continue to Assess for Equipment Needs.    See \"Rehab Therapy-Acute\" Patient Summary Report for complete documentation.       "

## 2018-06-01 NOTE — THERAPY
Occupational Therapy Contact Note:    OT tx attempted, pt very lethargic from DANICA today. Will attempt again as able.     Lisa Nieto, OTR/L  Pager: [phone]

## 2018-06-02 LAB
ANION GAP SERPL CALC-SCNC: 10 MMOL/L (ref 0–11.9)
BACTERIA BLD CULT: NORMAL
BACTERIA BLD CULT: NORMAL
BASOPHILS # BLD AUTO: 0.5 % (ref 0–1.8)
BASOPHILS # BLD: 0.09 K/UL (ref 0–0.12)
BUN SERPL-MCNC: 11 MG/DL (ref 8–22)
CALCIUM SERPL-MCNC: 9.4 MG/DL (ref 8.5–10.5)
CHLORIDE SERPL-SCNC: 106 MMOL/L (ref 96–112)
CO2 SERPL-SCNC: 19 MMOL/L (ref 20–33)
CREAT SERPL-MCNC: 0.66 MG/DL (ref 0.5–1.4)
EOSINOPHIL # BLD AUTO: 0.03 K/UL (ref 0–0.51)
EOSINOPHIL NFR BLD: 0.2 % (ref 0–6.9)
ERYTHROCYTE [DISTWIDTH] IN BLOOD BY AUTOMATED COUNT: 39.6 FL (ref 35.9–50)
GLUCOSE SERPL-MCNC: 120 MG/DL (ref 65–99)
HCT VFR BLD AUTO: 45 % (ref 37–47)
HGB BLD-MCNC: 15.2 G/DL (ref 12–16)
IMM GRANULOCYTES # BLD AUTO: 0.09 K/UL (ref 0–0.11)
IMM GRANULOCYTES NFR BLD AUTO: 0.5 % (ref 0–0.9)
LYMPHOCYTES # BLD AUTO: 2.25 K/UL (ref 1–4.8)
LYMPHOCYTES NFR BLD: 12.2 % (ref 22–41)
MAGNESIUM SERPL-MCNC: 1.8 MG/DL (ref 1.5–2.5)
MCH RBC QN AUTO: 30.5 PG (ref 27–33)
MCHC RBC AUTO-ENTMCNC: 33.8 G/DL (ref 33.6–35)
MCV RBC AUTO: 90.2 FL (ref 81.4–97.8)
MONOCYTES # BLD AUTO: 0.94 K/UL (ref 0–0.85)
MONOCYTES NFR BLD AUTO: 5.1 % (ref 0–13.4)
NEUTROPHILS # BLD AUTO: 15.03 K/UL (ref 2–7.15)
NEUTROPHILS NFR BLD: 81.5 % (ref 44–72)
NRBC # BLD AUTO: 0 K/UL
NRBC BLD-RTO: 0 /100 WBC
PLATELET # BLD AUTO: 268 K/UL (ref 164–446)
PMV BLD AUTO: 10.5 FL (ref 9–12.9)
POTASSIUM SERPL-SCNC: 4 MMOL/L (ref 3.6–5.5)
RBC # BLD AUTO: 4.99 M/UL (ref 4.2–5.4)
SIGNIFICANT IND 70042: NORMAL
SIGNIFICANT IND 70042: NORMAL
SITE SITE: NORMAL
SITE SITE: NORMAL
SODIUM SERPL-SCNC: 135 MMOL/L (ref 135–145)
SOURCE SOURCE: NORMAL
SOURCE SOURCE: NORMAL
WBC # BLD AUTO: 18.4 K/UL (ref 4.8–10.8)

## 2018-06-02 PROCEDURE — A9270 NON-COVERED ITEM OR SERVICE: HCPCS | Performed by: HOSPITALIST

## 2018-06-02 PROCEDURE — 99232 SBSQ HOSP IP/OBS MODERATE 35: CPT | Performed by: HOSPITALIST

## 2018-06-02 PROCEDURE — 80048 BASIC METABOLIC PNL TOTAL CA: CPT

## 2018-06-02 PROCEDURE — A9270 NON-COVERED ITEM OR SERVICE: HCPCS | Performed by: INTERNAL MEDICINE

## 2018-06-02 PROCEDURE — 85025 COMPLETE CBC W/AUTO DIFF WBC: CPT

## 2018-06-02 PROCEDURE — 770001 HCHG ROOM/CARE - MED/SURG/GYN PRIV*

## 2018-06-02 PROCEDURE — 700111 HCHG RX REV CODE 636 W/ 250 OVERRIDE (IP): Performed by: INTERNAL MEDICINE

## 2018-06-02 PROCEDURE — 700102 HCHG RX REV CODE 250 W/ 637 OVERRIDE(OP): Performed by: HOSPITALIST

## 2018-06-02 PROCEDURE — 99232 SBSQ HOSP IP/OBS MODERATE 35: CPT | Performed by: INTERNAL MEDICINE

## 2018-06-02 PROCEDURE — 700102 HCHG RX REV CODE 250 W/ 637 OVERRIDE(OP): Performed by: INTERNAL MEDICINE

## 2018-06-02 PROCEDURE — 83735 ASSAY OF MAGNESIUM: CPT

## 2018-06-02 RX ADMIN — ONDANSETRON 4 MG: 4 TABLET, ORALLY DISINTEGRATING ORAL at 17:52

## 2018-06-02 RX ADMIN — ASPIRIN 325 MG: 325 TABLET ORAL at 08:22

## 2018-06-02 RX ADMIN — STANDARDIZED SENNA CONCENTRATE AND DOCUSATE SODIUM 2 TABLET: 8.6; 5 TABLET, FILM COATED ORAL at 08:22

## 2018-06-02 RX ADMIN — ATORVASTATIN CALCIUM 80 MG: 40 TABLET, FILM COATED ORAL at 20:01

## 2018-06-02 RX ADMIN — MECLIZINE HYDROCHLORIDE 25 MG: 25 TABLET ORAL at 14:42

## 2018-06-02 ASSESSMENT — ENCOUNTER SYMPTOMS
WEIGHT LOSS: 0
ABDOMINAL PAIN: 0
BRUISES/BLEEDS EASILY: 0
VOMITING: 0
NAUSEA: 0
POLYDIPSIA: 0
MYALGIAS: 0
NERVOUS/ANXIOUS: 0
HEADACHES: 0
BLURRED VISION: 0
WEAKNESS: 0
TREMORS: 0
SPEECH CHANGE: 0
HEADACHES: 1
TINGLING: 0
SINUS PAIN: 0
FEVER: 0
CLAUDICATION: 0
HEMOPTYSIS: 0
NECK PAIN: 0
DIARRHEA: 0
LOSS OF CONSCIOUSNESS: 0
PALPITATIONS: 0
SHORTNESS OF BREATH: 0
FOCAL WEAKNESS: 1
DEPRESSION: 0
DOUBLE VISION: 0
BACK PAIN: 0
DIZZINESS: 1
COUGH: 0
ORTHOPNEA: 0
EYE PAIN: 0
SPUTUM PRODUCTION: 0
PHOTOPHOBIA: 0
CONSTIPATION: 0
SEIZURES: 0

## 2018-06-02 ASSESSMENT — PAIN SCALES - GENERAL
PAINLEVEL_OUTOF10: 0

## 2018-06-02 NOTE — CARE PLAN
Problem: Bowel/Gastric:  Goal: Normal bowel function is maintained or improved  Outcome: PROGRESSING AS EXPECTED  Pt states she has not had a bowel movement today which is outside her normal pattern. Pt did take morning dose of senna a little later today due to DANICA procedure. Pt refused night dose and would like to re-evaluate in the morning. Pt states it could be due to decrease water consumption as well.     Problem: Mobility  Goal: Risk for activity intolerance will decrease  Outcome: PROGRESSING AS EXPECTED  RN to assess pt's mobility needs and encourage pt to alert RN when needing to ambulate to the bathroom to reduce risk of injury. RN to ensure pt dangles legs at EOB prior to standing to reduce dizziness and likelihood of fall when ambulating. RN to educate pt on proper mechanics of FFW usage to ensure safety and adequate assistance when ambulating.

## 2018-06-02 NOTE — PROGRESS NOTES
[facility] Hospitalist Progress Note    Date of Service: 2018    Chief Complaint  42 y.o. female admitted 2018 with sudden onset of nausea, vomiting, vertigo and headache.    Ms. Martinez has a history of hyperlipidemia.  She presented 2018 with acute onset of symptoms which included nausea, vomiting, vertigo.  Workup in the emergency room demonstrated that she had a leukocytosis and lactic acidosis.  The admitting physician ordered an MRI of the brain to evaluate for acute onset of new vertigo.  MRI on 2018 demonstrated acute left cerebellar infarct in the PICA territory.  There is also some concern on the MRI for left vertebral artery dissection.  A follow-up CT scan of the head and neck did not show evidence of vertebral artery dissection.  Due to the size of the stroke, patient was moved to the ICU for close monitoring as she was at risk for posterior fossa edema.  Additional stroke workup included an echocardiogram which demonstrated a PFO.  DANICA on 2018 confirmed PFO.    Interval Problem Update  Decadron was helpful for dizziness and nausea, no emesis  In SR 70's-90's, 's-130's  Ambulating with walker, steady  She is dizzy sometimes when she stands up    Consultants/Specialty  Critical Care, I discussed the patient's condition with Dr. Gibbs this morning on ICU Rounds  Neurology    Disposition  Keep in ICU for serial neuro checks      Review of Systems   Constitutional: Negative for malaise/fatigue.   Respiratory: Negative for cough and hemoptysis.    Cardiovascular: Negative for palpitations and orthopnea.   Skin: Negative for itching and rash.   Neurological: Positive for dizziness and headaches. Negative for tingling, tremors and weakness.      Physical Exam  Laboratory/Imaging   Hemodynamics  Temp (24hrs), Av.3 °C (97.3 °F), Min:35.8 °C (96.5 °F), Max:36.6 °C (97.9 °F)   Temperature: 36 °C (96.8 °F)  Pulse  Av  Min: 58  Max: 109 Heart Rate (Monitored): 78  Blood  Pressure: 139/94, NIBP: 105/74      Respiratory      Respiration: 16, Pulse Oximetry: 97 %     Work Of Breathing / Effort: Mild  RUL Breath Sounds: Clear, RML Breath Sounds: Clear, RLL Breath Sounds: Clear, ANDREW Breath Sounds: Clear, LLL Breath Sounds: Clear    Fluids    Intake/Output Summary (Last 24 hours) at 06/02/18 0903  Last data filed at 06/02/18 0800   Gross per 24 hour   Intake              730 ml   Output                0 ml   Net              730 ml       Nutrition  Orders Placed This Encounter   Procedures   • DIET ORDER     Standing Status:   Standing     Number of Occurrences:   1     Order Specific Question:   Diet:     Answer:   Regular [1]     Physical Exam   Constitutional: She appears well-developed and well-nourished.   HENT:   Head: Normocephalic and atraumatic.   Eyes: Conjunctivae and EOM are normal. Pupils are equal, round, and reactive to light. Right eye exhibits no discharge. Left eye exhibits no discharge. No scleral icterus.   Neck: Normal range of motion. Neck supple. No tracheal deviation present. No thyromegaly present.   Cardiovascular: Normal rate, regular rhythm and normal heart sounds.  Exam reveals no gallop and no friction rub.    Pulses:       Dorsalis pedis pulses are 2+ on the right side, and 2+ on the left side.   Pulmonary/Chest: Effort normal and breath sounds normal. She has no wheezes. She has no rales.   Abdominal: Soft. Bowel sounds are normal. She exhibits no distension and no mass. There is no tenderness.   Musculoskeletal: Normal range of motion. She exhibits no edema or tenderness.   Neurological: She is alert. She displays normal reflexes. Coordination abnormal.   Skin: Skin is warm and dry. No rash noted. She is not diaphoretic. No cyanosis.   Psychiatric: Her behavior is normal.   Nursing note and vitals reviewed.      Recent Labs      05/31/18   0604  06/01/18   0504  06/02/18   0530   WBC  10.4  11.7*  18.4*   RBC  4.32  4.58  4.99   HEMOGLOBIN  13.3  14.1   15.2   HEMATOCRIT  39.7  41.5  45.0   MCV  91.9  90.6  90.2   MCH  30.8  30.8  30.5   MCHC  33.5*  34.0  33.8   RDW  40.5  40.4  39.6   PLATELETCT  193  215  268   MPV  10.6  10.4  10.5     Recent Labs      05/31/18   0604  06/01/18   0504  06/02/18   0530   SODIUM  136  135  135   POTASSIUM  3.9  4.1  4.0   CHLORIDE  110  108  106   CO2  19*  21  19*   GLUCOSE  92  97  120*   BUN  10  9  11   CREATININE  0.62  0.70  0.66   CALCIUM  8.1*  8.3*  9.4                      Assessment/Plan     * CVA (cerebral vascular accident) (HCC)- (present on admission)   Assessment & Plan    Left cerebellar  Posterior circulation distribution  CTA does not demonstrate vertebral artery dissection  No DVT on duplex ultrasound 5/30/2018  Continue high-dose statin and antiplatelet therapy with aspirin        PFO (patent foramen ovale)- (present on admission)   Assessment & Plan    DANICA confirms PFO  Patient will need to be referred to [place] for closure        Headache- (present on admission)   Assessment & Plan    From acute CVA        Lactic acidosis- (present on admission)   Assessment & Plan    Suspect secondary to dehydration and nausea/vomiting rather than septic process        Vertigo- (present on admission)   Assessment & Plan    Secondary to acute CVA  This is improved        Leukocytosis- (present on admission)   Assessment & Plan    This is likely reactive to the steroids she got yesterday          Tobacco dependence- (present on admission)   Assessment & Plan    Cessation recommended            Quality-Core Measures   Reviewed items::  Labs reviewed and Medications reviewed  Dc catheter::  No Dc  DVT prophylaxis pharmacological::  Contraindicated - High bleeding risk  DVT prophylaxis - mechanical:  SCDs  Assessed for rehabilitation services:  Patient was assess for and/or received rehabilitation services during this hospitalization

## 2018-06-02 NOTE — PROGRESS NOTES
Pulmonary Critical Care Progress Note      Date of service:  6/2/2018    Chief Complaint:   Dizziness, nausea    Interval Events:  24 hour interval history reviewed  Reason for visit:  Acute cerebellar stroke       - oriented x 4   - dizziness resolved   - SR   - good diet      Her dizziness is now resolved.  She received a dose of Decadron yesterday.  Her nausea has improved.  She has no vomiting or abdominal pain.  She is eating well.  She has no photophobia, diplopia, blurred vision or seizures.  She denies focal weakness.  She has no cough, sputum production or hemoptysis.  She denies any angina, palpitations or syncope.  She is feeling better.      Review of Systems   Constitutional: Negative for malaise/fatigue and weight loss.   HENT: Negative for congestion, ear discharge, ear pain, nosebleeds and sinus pain.    Eyes: Negative for blurred vision, double vision, photophobia and pain.   Respiratory: Negative for cough, hemoptysis, sputum production and shortness of breath.    Cardiovascular: Negative for chest pain, palpitations, orthopnea, claudication and leg swelling.   Gastrointestinal: Negative for abdominal pain, constipation, diarrhea and vomiting.   Genitourinary: Negative for dysuria, frequency and urgency.   Musculoskeletal: Negative for back pain, myalgias and neck pain.   Skin: Negative for rash.   Neurological: Negative for speech change, seizures, loss of consciousness and headaches.   Endo/Heme/Allergies: Negative for environmental allergies and polydipsia. Does not bruise/bleed easily.   Psychiatric/Behavioral: Negative for depression and suicidal ideas. The patient is not nervous/anxious.        Physical Exam   Constitutional: She is oriented to person, place, and time and well-developed, well-nourished, and in no distress. No distress.   HENT:   Head: Normocephalic and atraumatic.   Right Ear: External ear normal.   Left Ear: External ear normal.   Nose: Nose normal.   Eyes: Conjunctivae and  EOM are normal. Pupils are equal, round, and reactive to light. Right eye exhibits no discharge. Left eye exhibits no discharge. No scleral icterus.   Neck: Normal range of motion. Neck supple. No JVD present. No tracheal deviation present.   Cardiovascular: Intact distal pulses.  Exam reveals no gallop and no friction rub.    Sinus rhythm   Pulmonary/Chest: Effort normal and breath sounds normal. No stridor. No respiratory distress. She has no wheezes. She has no rales.   Abdominal: Soft. Bowel sounds are normal. She exhibits no distension. There is no tenderness. There is no rebound.   Musculoskeletal: She exhibits no tenderness or deformity.   No clubbing or cyanosis   Neurological: She is alert and oriented to person, place, and time. No cranial nerve deficit. Coordination abnormal. GCS score is 15.   Skin: Skin is warm and dry. No rash noted. She is not diaphoretic. No erythema. No pallor.   Psychiatric: Memory and affect normal.       PFSH:  No change.    Respiratory:     Pulse Oximetry: 95 %    HemoDynamics:  Pulse: 79, Heart Rate (Monitored): 79  Blood Pressure: 139/94, NIBP: (!) 96/64       Neuro:    Fluids:  Intake/Output       05/31/18 0700 - 06/01/18 0659 06/01/18 0700 - 06/02/18 0659 06/02/18 0700 - 06/03/18 0659      7567-1028 9253-8242 Total 4889-8925 1943-6200 Total 5940-7003 3177-6600 Total       Intake    P.O.  1070  150 1220  240  250 490  --  -- --    P.O. 1896 014 4221 240 250 490 -- -- --    I.V.  996  996 1992  --  -- --  --  -- --    NS + K  -- -- -- -- -- --    Total Intake 2066 1146 3212 240 250 490 -- -- --       Output    Urine  1650  800 2450  --  -- --  --  -- --    Number of Times Voided 2 x 3 x 5 x 6 x 5 x 11 x -- -- --    Urine Void (mL) (non-catheter) 9512 443 0022 -- -- -- -- -- --    Stool  --  0 0  --  0 0  --  -- --    Number of Times Stooled 1 x 0 x 1 x -- 0 x 0 x -- -- --    Measurable Stool (mL) -- 0 0 -- 0 0 -- -- --    Total Output 7309 241 3961 -- 0 0 -- -- --        Net I/O     416 346 762 240 250 490 -- -- --        Weight: 106.9 kg (235 lb 10.8 oz)  Recent Labs      18   0618   0504  18   0530   SODIUM  136  135  135   POTASSIUM  3.9  4.1  4.0   CHLORIDE  110  108  106   CO2  19*  21  19*   BUN  10  9  11   CREATININE  0.62  0.70  0.66   MAGNESIUM  1.8  1.8  1.8   CALCIUM  8.1*  8.3*  9.4       GI/Nutrition:    Liver Function  Recent Labs      18   0604  18   0504  18   0530   GLUCOSE  92  97  120*       Heme:  Recent Labs      18   0504  18   0530   RBC  4.32  4.58  4.99   HEMOGLOBIN  13.3  14.1  15.2   HEMATOCRIT  39.7  41.5  45.0   PLATELETCT  193  215  268       Infectious Disease:  Temp  Av.3 °C (97.3 °F)  Min: 35.8 °C (96.5 °F)  Max: 36.6 °C (97.9 °F)    Recent Labs      18   0618   0504  18   0530   WBC  10.4  11.7*  18.4*   NEUTSPOLYS  51.80  59.10  81.50*   LYMPHOCYTES  35.50  29.70  12.20*   MONOCYTES  8.70  7.60  5.10   EOSINOPHILS  2.70  2.70  0.20   BASOPHILS  0.70  0.60  0.50     Current Facility-Administered Medications   Medication Dose Frequency Provider Last Rate Last Dose   • atorvastatin (LIPITOR) tablet 80 mg  80 mg QHS Sergio Villanueva M.D.   80 mg at 18   • aspirin (ASA) tablet 325 mg  325 mg DAILY Sergio Villanueva M.D.   325 mg at 18 1459   • ondansetron (ZOFRAN) syringe/vial injection 4 mg  4 mg Q4HRS PRN Keyon Gibbs M.D.   4 mg at 18 0959   • HYDROcodone-acetaminophen (NORCO) 5-325 MG per tablet 1-2 Tab  1-2 Tab Q6HRS PRN Keyon Gibbs M.D.   1 Tab at 18 0606   • senna-docusate (PERICOLACE or SENOKOT S) 8.6-50 MG per tablet 2 Tab  2 Tab BID Dmitry Pond M.D.   2 Tab at 18 1459    And   • polyethylene glycol/lytes (MIRALAX) PACKET 1 Packet  1 Packet QDAY EDUARDO Pond M.D.        And   • magnesium hydroxide (MILK OF MAGNESIA) suspension 30 mL  30 mL QDAY EDUARDO Pond M.D.        And    • bisacodyl (DULCOLAX) suppository 10 mg  10 mg QDAY PRN Dmitry Pond M.D.       • Respiratory Care per Protocol   Continuous RT Dmitry Pond M.D.       • ondansetron (ZOFRAN ODT) dispertab 4 mg  4 mg Q4HRS PRN Dmitry Pond M.D.       • promethazine (PHENERGAN) tablet 12.5-25 mg  12.5-25 mg Q4HRS PRN Dmitry Pond M.D.       • promethazine (PHENERGAN) suppository 12.5-25 mg  12.5-25 mg Q4HRS PRN Dmitry Pond M.D.       • prochlorperazine (COMPAZINE) injection 5-10 mg  5-10 mg Q4HRS PRN Dmitry Pond M.D.       • nicotine (NICODERM) 14 MG/24HR 14 mg  14 mg Daily-0600 Dmitry Pond M.D.   14 mg at 06/01/18 0518    And   • nicotine polacrilex (NICORETTE) 2 MG piece 2 mg  2 mg Q HOUR PRN Dmitry Pond M.D.       • acetaminophen (TYLENOL) tablet 650 mg  650 mg Q6HRS PRN Dmitry Pond M.D.   650 mg at 05/30/18 2241   • meclizine (ANTIVERT) tablet 25 mg  25 mg TID PRN Dmitry Pond M.D.         Last reviewed on 5/28/2018 10:53 AM by Paris Ramírez R.N.    Quality  Measures:  Labs reviewed, Medications reviewed and Radiology images reviewed  Dc catheter: No Dc      DVT Prophylaxis: Contraindicated - High bleeding risk  DVT prophylaxis - mechanical: SCDs  Ulcer prophylaxis: Not indicated          Assessment and Plan:    Acute ischemic left cerebellar infarction              -No evidence of vertebral artery dissection on CT angiography              -Continue aspirin, high intensity statin              -Frequent neurologic checks              -High risk of developing malignant edema in the posterior fossa due to size of infarction    Atrial septal aneurysm with PFO on DANICA   -We will need consultation from cardiothoracic surgery for consideration of closure of PFO     Dyslipidemia              -Continue statin     History of GERD      OK to transfer out of ICU.  [facility] will sign off on transfer.  Please call if you have any questions.    Discussed with RN, RT, clinical pharmacist, team, hospitalist

## 2018-06-02 NOTE — CARE PLAN
Problem: Communication  Goal: The ability to communicate needs accurately and effectively will improve  Outcome: PROGRESSING AS EXPECTED  [place] pt to call light to alert staff of needs. Educate pt about the plan of care, procedures, treatments, medications and allow for questions    Problem: Mobility  Goal: Risk for activity intolerance will decrease  Outcome: PROGRESSING AS EXPECTED  Assess for activity intolerance. Provide rest periods but encourage patient routinely to increase activity level. Ambulating to BR, sitting in the chair (ETC)

## 2018-06-02 NOTE — PROGRESS NOTES
Cardiology Progress Note               Author: Merari Rowley Date & Time created: 2018  10:46 AM     Interval History:    Underwent DANICA yesterday which showed atrial septal aneurysm with PFO and left to right shunt  No cardiac complaints  Monitor reviewed by me showed no significant ventricular or atrial dysrhythmias.      Chief Complaint:  Lightheadedness    Review of Systems   Constitutional: Negative for fever.   Eyes: Negative for double vision.   Respiratory: Negative for shortness of breath.    Cardiovascular: Negative for chest pain and palpitations.   Gastrointestinal: Negative for nausea.   Neurological: Positive for focal weakness and headaches.       Physical Exam    Hemodynamics:  Temp (24hrs), Av.3 °C (97.3 °F), Min:35.8 °C (96.5 °F), Max:36.6 °C (97.9 °F)  Temperature: 36 °C (96.8 °F)  Pulse  Av  Min: 58  Max: 109Heart Rate (Monitored): 81  Blood Pressure: 139/94, NIBP: 136/71     Respiratory:    Respiration: (!) 31, Pulse Oximetry: 96 %     Work Of Breathing / Effort: Mild  RUL Breath Sounds: Clear, RML Breath Sounds: Clear, RLL Breath Sounds: Clear, ANDREW Breath Sounds: Clear, LLL Breath Sounds: Clear  Fluids:  Date 18 0700 - 18 0659   Shift 5346-0299 7702-4549 6380-9869 24 Hour Total   I  N  T  A  K  E   P.O. 360   360    Shift Total 360   360   O  U  T  P  U  T   Shift Total       Weight (kg) 106.9 106.9 106.9 106.9       Weight: 106.9 kg (235 lb 10.8 oz)  GI/Nutrition:  Orders Placed This Encounter   Procedures   • DIET ORDER     Standing Status:   Standing     Number of Occurrences:   1     Order Specific Question:   Diet:     Answer:   Regular [1]     Lab Results:  Recent Labs      18   0604  18   0504  18   0530   WBC  10.4  11.7*  18.4*   RBC  4.32  4.58  4.99   HEMOGLOBIN  13.3  14.1  15.2   HEMATOCRIT  39.7  41.5  45.0   MCV  91.9  90.6  90.2   MCH  30.8  30.8  30.5   MCHC  33.5*  34.0  33.8   RDW  40.5  40.4  39.6   PLATELETCT  193  813 458    MPV  10.6  10.4  10.5     Recent Labs      05/31/18   0604  06/01/18   0504  06/02/18   0530   SODIUM  136  135  135   POTASSIUM  3.9  4.1  4.0   CHLORIDE  110  108  106   CO2  19*  21  19*   GLUCOSE  92  97  120*   BUN  10  9  11   CREATININE  0.62  0.70  0.66   CALCIUM  8.1*  8.3*  9.4                         Medical Decision Making, by Problem:  Active Hospital Problems    Diagnosis   • *CVA (cerebral vascular accident) (HCC) [I63.9] left cerebellar   • PFO (patent foramen ovale) [Q21.1]   • Headache [R51]   • Vertigo [R42]   • Lactic acidosis [E87.2]   • Leukocytosis [D72.829]   • Tobacco dependence [F17.200]       Plan:  May transfer to floor from our standpoint  Cont PT  Outpt referral for consideration of PFO closure      Quality-Core Measures

## 2018-06-03 ENCOUNTER — PATIENT OUTREACH (OUTPATIENT)
Dept: HEALTH INFORMATION MANAGEMENT | Facility: OTHER | Age: 43
End: 2018-06-03

## 2018-06-03 LAB
ANION GAP SERPL CALC-SCNC: 10 MMOL/L (ref 0–11.9)
BASOPHILS # BLD AUTO: 0.6 % (ref 0–1.8)
BASOPHILS # BLD: 0.1 K/UL (ref 0–0.12)
BUN SERPL-MCNC: 19 MG/DL (ref 8–22)
CALCIUM SERPL-MCNC: 8.7 MG/DL (ref 8.5–10.5)
CHLORIDE SERPL-SCNC: 107 MMOL/L (ref 96–112)
CO2 SERPL-SCNC: 20 MMOL/L (ref 20–33)
CREAT SERPL-MCNC: 0.75 MG/DL (ref 0.5–1.4)
EOSINOPHIL # BLD AUTO: 0.24 K/UL (ref 0–0.51)
EOSINOPHIL NFR BLD: 1.5 % (ref 0–6.9)
ERYTHROCYTE [DISTWIDTH] IN BLOOD BY AUTOMATED COUNT: 41.1 FL (ref 35.9–50)
GLUCOSE SERPL-MCNC: 88 MG/DL (ref 65–99)
HCT VFR BLD AUTO: 45.1 % (ref 37–47)
HGB BLD-MCNC: 15.3 G/DL (ref 12–16)
IMM GRANULOCYTES # BLD AUTO: 0.09 K/UL (ref 0–0.11)
IMM GRANULOCYTES NFR BLD AUTO: 0.5 % (ref 0–0.9)
LYMPHOCYTES # BLD AUTO: 4.99 K/UL (ref 1–4.8)
LYMPHOCYTES NFR BLD: 30.5 % (ref 22–41)
MAGNESIUM SERPL-MCNC: 1.7 MG/DL (ref 1.5–2.5)
MCH RBC QN AUTO: 30.7 PG (ref 27–33)
MCHC RBC AUTO-ENTMCNC: 33.9 G/DL (ref 33.6–35)
MCV RBC AUTO: 90.4 FL (ref 81.4–97.8)
MONOCYTES # BLD AUTO: 1.19 K/UL (ref 0–0.85)
MONOCYTES NFR BLD AUTO: 7.3 % (ref 0–13.4)
NEUTROPHILS # BLD AUTO: 9.77 K/UL (ref 2–7.15)
NEUTROPHILS NFR BLD: 59.6 % (ref 44–72)
NRBC # BLD AUTO: 0 K/UL
NRBC BLD-RTO: 0 /100 WBC
PLATELET # BLD AUTO: 245 K/UL (ref 164–446)
PMV BLD AUTO: 10.6 FL (ref 9–12.9)
POTASSIUM SERPL-SCNC: 3.7 MMOL/L (ref 3.6–5.5)
RBC # BLD AUTO: 4.99 M/UL (ref 4.2–5.4)
SODIUM SERPL-SCNC: 137 MMOL/L (ref 135–145)
WBC # BLD AUTO: 16.4 K/UL (ref 4.8–10.8)

## 2018-06-03 PROCEDURE — A9270 NON-COVERED ITEM OR SERVICE: HCPCS | Performed by: INTERNAL MEDICINE

## 2018-06-03 PROCEDURE — 99232 SBSQ HOSP IP/OBS MODERATE 35: CPT | Performed by: INTERNAL MEDICINE

## 2018-06-03 PROCEDURE — 83735 ASSAY OF MAGNESIUM: CPT

## 2018-06-03 PROCEDURE — A9270 NON-COVERED ITEM OR SERVICE: HCPCS | Performed by: HOSPITALIST

## 2018-06-03 PROCEDURE — 700102 HCHG RX REV CODE 250 W/ 637 OVERRIDE(OP): Performed by: INTERNAL MEDICINE

## 2018-06-03 PROCEDURE — 80048 BASIC METABOLIC PNL TOTAL CA: CPT

## 2018-06-03 PROCEDURE — 700102 HCHG RX REV CODE 250 W/ 637 OVERRIDE(OP): Performed by: HOSPITALIST

## 2018-06-03 PROCEDURE — 700111 HCHG RX REV CODE 636 W/ 250 OVERRIDE (IP): Performed by: INTERNAL MEDICINE

## 2018-06-03 PROCEDURE — 85025 COMPLETE CBC W/AUTO DIFF WBC: CPT

## 2018-06-03 PROCEDURE — 99232 SBSQ HOSP IP/OBS MODERATE 35: CPT | Performed by: HOSPITALIST

## 2018-06-03 PROCEDURE — 770006 HCHG ROOM/CARE - MED/SURG/GYN SEMI*

## 2018-06-03 RX ORDER — POTASSIUM CHLORIDE 20 MEQ/1
40 TABLET, EXTENDED RELEASE ORAL ONCE
Status: COMPLETED | OUTPATIENT
Start: 2018-06-03 | End: 2018-06-03

## 2018-06-03 RX ORDER — MAGNESIUM SULFATE HEPTAHYDRATE 40 MG/ML
2 INJECTION, SOLUTION INTRAVENOUS ONCE
Status: COMPLETED | OUTPATIENT
Start: 2018-06-03 | End: 2018-06-03

## 2018-06-03 RX ADMIN — ASPIRIN 325 MG: 325 TABLET ORAL at 09:19

## 2018-06-03 RX ADMIN — POTASSIUM CHLORIDE 40 MEQ: 1500 TABLET, EXTENDED RELEASE ORAL at 12:20

## 2018-06-03 RX ADMIN — MAGNESIUM SULFATE IN WATER 2 G: 40 INJECTION, SOLUTION INTRAVENOUS at 12:20

## 2018-06-03 RX ADMIN — NICOTINE 14 MG: 14 PATCH, EXTENDED RELEASE TRANSDERMAL at 06:11

## 2018-06-03 RX ADMIN — ATORVASTATIN CALCIUM 80 MG: 40 TABLET, FILM COATED ORAL at 19:45

## 2018-06-03 ASSESSMENT — ENCOUNTER SYMPTOMS
TREMORS: 0
WEAKNESS: 0
COUGH: 0
SHORTNESS OF BREATH: 0
HEMOPTYSIS: 0
BRUISES/BLEEDS EASILY: 0
POLYDIPSIA: 0
CHILLS: 0
FEVER: 0
DIAPHORESIS: 0
PHOTOPHOBIA: 0
EYE PAIN: 0
NECK PAIN: 0
HEADACHES: 1
ABDOMINAL PAIN: 0
DOUBLE VISION: 0
DIZZINESS: 1
BACK PAIN: 0
NERVOUS/ANXIOUS: 0
HEADACHES: 0
LOSS OF CONSCIOUSNESS: 0
BLURRED VISION: 0
SPUTUM PRODUCTION: 0
SPEECH CHANGE: 0
VOMITING: 0
PALPITATIONS: 0
DIARRHEA: 0
SEIZURES: 0
ORTHOPNEA: 0
DEPRESSION: 0
MYALGIAS: 0
TINGLING: 0
STRIDOR: 0
NAUSEA: 0

## 2018-06-03 ASSESSMENT — PAIN SCALES - GENERAL
PAINLEVEL_OUTOF10: 0

## 2018-06-03 NOTE — CARE PLAN
Problem: Pain Management  Goal: Pain level will decrease to patient's comfort goal  Outcome: PROGRESSING AS EXPECTED  Assessing pain q2h. Medicated as needed with PRNs, see MAR.    Problem: Mobility  Goal: Risk for activity intolerance will decrease  Outcome: PROGRESSING AS EXPECTED  Ambulating to bathroom as needed with front wheel walker and stand by assist. Gait steady.

## 2018-06-03 NOTE — PROGRESS NOTES
Pulmonary Critical Care Progress Note      Date of service:  6/3/2018    Chief Complaint:   Dizziness, nausea    Interval Events:  24 hour interval history reviewed  Reason for visit:  Acute cerebellar stroke       - SR   - good UOP   - replete Mg and K      She feels that her coordination is improved.  She denies any visual changes, photophobia or diplopia.  She does not have a headache.  She does not have any nausea, vomiting or abdominal pain.  She has no shortness of breath, cough, sputum production or hemoptysis.  She is feeling better.      Review of Systems   Constitutional: Negative for chills, diaphoresis and fever.   HENT: Negative for ear discharge, ear pain and nosebleeds.    Eyes: Negative for blurred vision, double vision, photophobia and pain.   Respiratory: Negative for cough, hemoptysis, sputum production, shortness of breath and stridor.    Cardiovascular: Negative for chest pain, palpitations and orthopnea.   Gastrointestinal: Negative for abdominal pain, diarrhea, nausea and vomiting.   Genitourinary: Negative for dysuria, frequency and urgency.   Musculoskeletal: Negative for back pain, myalgias and neck pain.   Skin: Negative for itching.   Neurological: Negative for speech change, seizures, loss of consciousness and headaches.   Endo/Heme/Allergies: Negative for polydipsia. Does not bruise/bleed easily.   Psychiatric/Behavioral: Negative for depression and suicidal ideas. The patient is not nervous/anxious.        Physical Exam   Constitutional: She is oriented to person, place, and time and well-developed, well-nourished, and in no distress. No distress.   HENT:   Head: Normocephalic and atraumatic.   Right Ear: External ear normal.   Left Ear: External ear normal.   Nose: Nose normal.   Mouth/Throat: Oropharynx is clear and moist.   Eyes: Conjunctivae and EOM are normal. Pupils are equal, round, and reactive to light. Right eye exhibits no discharge. Left eye exhibits no discharge. No  scleral icterus.   Neck: Normal range of motion. Neck supple. No JVD present. No tracheal deviation present.   Cardiovascular: Regular rhythm and intact distal pulses.  Exam reveals no gallop and no friction rub.    Sinus rhythm   Pulmonary/Chest: Effort normal and breath sounds normal. No stridor. No respiratory distress. She has no wheezes. She has no rales.   Abdominal: Soft. Bowel sounds are normal. She exhibits no distension. There is no tenderness. There is no rebound.   Musculoskeletal: Normal range of motion. She exhibits no tenderness or deformity.   No clubbing or cyanosis   Neurological: She is alert and oriented to person, place, and time. No cranial nerve deficit. Coordination abnormal. GCS score is 15.   Skin: Skin is warm and dry. No rash noted. She is not diaphoretic. No erythema. No pallor.   Psychiatric: Memory and affect normal.       PFSH:  No change.    Respiratory:     Pulse Oximetry: 94 %    HemoDynamics:  Pulse: 70, Heart Rate (Monitored): 70  NIBP: (!) 84/50       Neuro:    Fluids:  Intake/Output       06/01/18 0700 - 06/02/18 0659 06/02/18 0700 - 06/03/18 0659 06/03/18 0700 - 06/04/18 0659      [id number] 8366-4665 Total 2618-6567 7679-3692 Total 2355-8898 7522-4967 Total       Intake    P.O.  240  250 490  960  300 1260  --  -- --    P.O. 240 250 490  -- -- --    Total Intake 240 250 490  -- -- --       Output    Urine  --  -- --  --  -- --  --  -- --    Number of Times Voided 6 x 5 x 11 x 7 x 6 x 13 x -- -- --    Stool  --  0 0  --  -- --  --  -- --    Number of Times Stooled -- 0 x 0 x 1 x 5 x 6 x -- -- --    Measurable Stool (mL) -- 0 0 -- -- -- -- -- --    Total Output -- 0 0 -- -- -- -- -- --       Net I/O     240 250 490  -- -- --        Weight:  (bed broken, unable to weigh)  Recent Labs      06/01/18   0504  06/02/18   0530  06/03/18   0430   SODIUM  135  135  137   POTASSIUM  4.1  4.0  3.7   CHLORIDE  108  106  107   CO2  21  19*  20   BUN  9  11   19   CREATININE  0.70  0.66  0.75   MAGNESIUM  1.8  1.8  1.7   CALCIUM  8.3*  9.4  8.7       GI/Nutrition:    Liver Function  Recent Labs      18   0504  18   0530  18   0430   GLUCOSE  97  120*  88       Heme:  Recent Labs      18   0504  18   0530  18   0430   RBC  4.58  4.99  4.99   HEMOGLOBIN  14.1  15.2  15.3   HEMATOCRIT  41.5  45.0  45.1   PLATELETCT  215  268  245       Infectious Disease:  Temp  Av.6 °C (97.9 °F)  Min: 36 °C (96.8 °F)  Max: 37.2 °C (98.9 °F)    Recent Labs      18   0504  18   0530  18   0430   WBC  11.7*  18.4*  16.4*   NEUTSPOLYS  59.10  81.50*  59.60   LYMPHOCYTES  29.70  12.20*  30.50   MONOCYTES  7.60  5.10  7.30   EOSINOPHILS  2.70  0.20  1.50   BASOPHILS  0.60  0.50  0.60     Current Facility-Administered Medications   Medication Dose Frequency Provider Last Rate Last Dose   • atorvastatin (LIPITOR) tablet 80 mg  80 mg QHS Sergio Villanueva M.D.   80 mg at 18   • aspirin (ASA) tablet 325 mg  325 mg DAILY Sergio Villanueva M.D.   325 mg at 18 0822   • ondansetron (ZOFRAN) syringe/vial injection 4 mg  4 mg Q4HRS PRN Keyon Gibbs M.D.   4 mg at 18 0959   • HYDROcodone-acetaminophen (NORCO) 5-325 MG per tablet 1-2 Tab  1-2 Tab Q6HRS PRN Keyon Gibbs M.D.   1 Tab at 18 0606   • senna-docusate (PERICOLACE or SENOKOT S) 8.6-50 MG per tablet 2 Tab  2 Tab BID Dmitry Pond M.D.   2 Tab at 18 0822    And   • polyethylene glycol/lytes (MIRALAX) PACKET 1 Packet  1 Packet QDAY PRN Dmitry Pond M.D.        And   • magnesium hydroxide (MILK OF MAGNESIA) suspension 30 mL  30 mL QDAY PRN Dmitry Pond M.D.        And   • bisacodyl (DULCOLAX) suppository 10 mg  10 mg QDAY PRN Dmitry Pond M.D.       • Respiratory Care per Protocol   Continuous RT Dmitry Pond M.D.       • ondansetron (ZOFRAN ODT) dispertab 4 mg  4 mg Q4HRS PRN Dmitry Pond M.D.   4 mg at 18 1752   • promethazine  (PHENERGAN) tablet 12.5-25 mg  12.5-25 mg Q4HRS PRN Dmitry Pond M.D.       • promethazine (PHENERGAN) suppository 12.5-25 mg  12.5-25 mg Q4HRS PRN Dmitry Pond M.D.       • prochlorperazine (COMPAZINE) injection 5-10 mg  5-10 mg Q4HRS PRN Dmitry Pond M.D.       • nicotine (NICODERM) 14 MG/24HR 14 mg  14 mg Daily-0600 Dmitry Pond M.D.   14 mg at 06/03/18 0611    And   • nicotine polacrilex (NICORETTE) 2 MG piece 2 mg  2 mg Q HOUR PRN Dmitry Pond M.D.       • acetaminophen (TYLENOL) tablet 650 mg  650 mg Q6HRS PRN Dmitry Pond M.D.   650 mg at 05/30/18 2241   • meclizine (ANTIVERT) tablet 25 mg  25 mg TID PRN Dmitry Pond M.D.   25 mg at 06/02/18 1442     Last reviewed on 5/28/2018 10:53 AM by Paris Ramírez R.N.    Quality  Measures:  Labs reviewed, Medications reviewed and Radiology images reviewed  Dc catheter: No Dc      DVT Prophylaxis: Contraindicated - High bleeding risk  DVT prophylaxis - mechanical: SCDs  Ulcer prophylaxis: Not indicated          Assessment and Plan:    Acute ischemic left cerebellar infarction              -No evidence of vertebral artery dissection on CT angiography              -Continue aspirin, high intensity statin              -Continue neurologic checks              -Increased risk of developing malignant edema in the posterior fossa due to size of infarction    Atrial septal aneurysm with PFO on DANICA   -We will need consultation from cardiothoracic surgery for consideration of closure of PFO     Dyslipidemia              -Continue statin     History of GERD      OK to transfer out of ICU.  [facility].  Please call if you have any questions.    Discussed with RN, RT, clinical pharmacist, team, hospitalist

## 2018-06-03 NOTE — PROGRESS NOTES
[facility] Hospitalist Progress Note    Date of Service: 6/3/2018    Chief Complaint  42 y.o. female admitted 2018 with sudden onset of nausea, vomiting, vertigo and headache.    Ms. Martinez has a history of hyperlipidemia and tobacco dependence.  She presented 2018 with acute onset of symptoms which included nausea, vomiting, vertigo.  Workup in the emergency room demonstrated that she had a leukocytosis and lactic acidosis.  The admitting physician ordered an MRI of the brain to evaluate for acute onset of new vertigo.  MRI on 2018 demonstrated acute left cerebellar infarct in the PICA territory.  There is also some concern on the MRI for left vertebral artery dissection.  A follow-up CT scan of the head and neck did not show evidence of vertebral artery dissection.  Due to the size of the stroke, patient was moved to the ICU for close monitoring as she was at risk for posterior fossa edema.  Additional stroke workup included an echocardiogram which demonstrated a PFO.  DANICA on 2018 confirmed PFO.    Interval Problem Update  In SR, HR 80's  SBP 90's-120's  Regular diet, +BM  Room Air, pulling 1250ml with IS  Dizziness and gait unsteadiness are significantly improved, she is not back to normal  She describes listing to the left while walking  No headache, no blurry vision    Consultants/Specialty  Critical Care, I discussed the patient's condition with Dr. Gibbs this morning on ICU Rounds  Neurology    Disposition  Keep in ICU for serial neuro checks      Review of Systems   Constitutional: Negative for malaise/fatigue.   Respiratory: Negative for cough and hemoptysis.    Cardiovascular: Negative for palpitations and orthopnea.   Skin: Negative for itching and rash.   Neurological: Positive for dizziness and headaches. Negative for tingling, tremors and weakness.      Physical Exam  Laboratory/Imaging   Hemodynamics  Temp (24hrs), Av.7 °C (98.1 °F), Min:36.1 °C (97 °F), Max:37.2 °C (98.9  °F)   Temperature: 36.7 °C (98 °F)  Pulse  Av.1  Min: 58  Max: 109 Heart Rate (Monitored): 70  NIBP: (!) 84/50      Respiratory      Respiration: 16, Pulse Oximetry: 94 %        RUL Breath Sounds: Clear, RML Breath Sounds: Clear, RLL Breath Sounds: Diminished, ANDREW Breath Sounds: Clear, LLL Breath Sounds: Diminished    Fluids    Intake/Output Summary (Last 24 hours) at 18 0900  Last data filed at 18 0600   Gross per 24 hour   Intake             1020 ml   Output                0 ml   Net             1020 ml       Nutrition  Orders Placed This Encounter   Procedures   • DIET ORDER     Standing Status:   Standing     Number of Occurrences:   1     Order Specific Question:   Diet:     Answer:   Regular [1]     Physical Exam   Constitutional: She is oriented to person, place, and time. She appears well-developed and well-nourished.   HENT:   Head: Normocephalic and atraumatic.   Eyes: Conjunctivae and EOM are normal. Pupils are equal, round, and reactive to light. Right eye exhibits no discharge. Left eye exhibits no discharge. No scleral icterus.   Neck: Normal range of motion. Neck supple. No thyromegaly present.   Cardiovascular: Normal rate, regular rhythm and normal heart sounds.  Exam reveals no gallop and no friction rub.    Pulses:       Dorsalis pedis pulses are 2+ on the right side, and 2+ on the left side.   Pulmonary/Chest: Effort normal and breath sounds normal. She has no wheezes. She has no rales.   Abdominal: Soft. Bowel sounds are normal. She exhibits no distension. There is no tenderness. There is no rebound.   Musculoskeletal: Normal range of motion. She exhibits no edema or tenderness.   Neurological: She is alert and oriented to person, place, and time. She displays normal reflexes. No cranial nerve deficit. Coordination abnormal.   Skin: Skin is warm and dry. No rash noted. She is not diaphoretic. No cyanosis or erythema.   Psychiatric: She has a normal mood and affect. Her behavior  is normal. Judgment and thought content normal.   Nursing note and vitals reviewed.      Recent Labs      06/01/18   0504  06/02/18   0530  06/03/18   0430   WBC  11.7*  18.4*  16.4*   RBC  4.58  4.99  4.99   HEMOGLOBIN  14.1  15.2  15.3   HEMATOCRIT  41.5  45.0  45.1   MCV  90.6  90.2  90.4   MCH  30.8  30.5  30.7   MCHC  34.0  33.8  33.9   RDW  40.4  39.6  41.1   PLATELETCT  215  268  245   MPV  10.4  10.5  10.6     Recent Labs      06/01/18   0504  06/02/18   0530  06/03/18   0430   SODIUM  135  135  137   POTASSIUM  4.1  4.0  3.7   CHLORIDE  108  106  107   CO2  21  19*  20   GLUCOSE  97  120*  88   BUN  9  11  19   CREATININE  0.70  0.66  0.75   CALCIUM  8.3*  9.4  8.7                      Assessment/Plan     * CVA (cerebral vascular accident) (HCC)- (present on admission)   Assessment & Plan    Left cerebellar  Posterior circulation distribution  CTA does not demonstrate vertebral artery dissection  No DVT on duplex ultrasound 5/30/2018  Continue high-dose statin and antiplatelet therapy with aspirin  Outpatient referral for PFO closure        PFO (patent foramen ovale)- (present on admission)   Assessment & Plan    DANICA confirms PFO  Patient will need to be referred to [place] for closure        Headache- (present on admission)   Assessment & Plan    From acute CVA        Lactic acidosis- (present on admission)   Assessment & Plan    Suspect secondary to dehydration and nausea/vomiting rather than septic process        Vertigo- (present on admission)   Assessment & Plan    Secondary to acute CVA  This is improved  PT/OT        Leukocytosis- (present on admission)   Assessment & Plan    No fever  Suspect related to steroids which were given for nausea        Tobacco dependence- (present on admission)   Assessment & Plan    Cessation recommended  She is motivated to stop smoking            Quality-Core Measures   Reviewed items::  Labs reviewed and Medications reviewed  Dc catheter::  No Dc  DVT  prophylaxis pharmacological::  Contraindicated - High bleeding risk  DVT prophylaxis - mechanical:  SCDs  Assessed for rehabilitation services:  Patient was assess for and/or received rehabilitation services during this hospitalization

## 2018-06-04 VITALS
BODY MASS INDEX: 43.37 KG/M2 | HEART RATE: 89 BPM | SYSTOLIC BLOOD PRESSURE: 112 MMHG | RESPIRATION RATE: 20 BRPM | HEIGHT: 62 IN | TEMPERATURE: 97 F | DIASTOLIC BLOOD PRESSURE: 84 MMHG | OXYGEN SATURATION: 95 % | WEIGHT: 235.67 LBS

## 2018-06-04 LAB
ANION GAP SERPL CALC-SCNC: 8 MMOL/L (ref 0–11.9)
BASOPHILS # BLD AUTO: 0.5 % (ref 0–1.8)
BASOPHILS # BLD: 0.06 K/UL (ref 0–0.12)
BUN SERPL-MCNC: 16 MG/DL (ref 8–22)
CALCIUM SERPL-MCNC: 8.7 MG/DL (ref 8.5–10.5)
CHLORIDE SERPL-SCNC: 108 MMOL/L (ref 96–112)
CO2 SERPL-SCNC: 21 MMOL/L (ref 20–33)
CREAT SERPL-MCNC: 0.68 MG/DL (ref 0.5–1.4)
EOSINOPHIL # BLD AUTO: 0.25 K/UL (ref 0–0.51)
EOSINOPHIL NFR BLD: 2 % (ref 0–6.9)
ERYTHROCYTE [DISTWIDTH] IN BLOOD BY AUTOMATED COUNT: 40.2 FL (ref 35.9–50)
GLUCOSE SERPL-MCNC: 101 MG/DL (ref 65–99)
HCT VFR BLD AUTO: 45.9 % (ref 37–47)
HGB BLD-MCNC: 15.8 G/DL (ref 12–16)
IMM GRANULOCYTES # BLD AUTO: 0.08 K/UL (ref 0–0.11)
IMM GRANULOCYTES NFR BLD AUTO: 0.6 % (ref 0–0.9)
LYMPHOCYTES # BLD AUTO: 3.41 K/UL (ref 1–4.8)
LYMPHOCYTES NFR BLD: 27.4 % (ref 22–41)
MAGNESIUM SERPL-MCNC: 2 MG/DL (ref 1.5–2.5)
MCH RBC QN AUTO: 30.9 PG (ref 27–33)
MCHC RBC AUTO-ENTMCNC: 34.4 G/DL (ref 33.6–35)
MCV RBC AUTO: 89.8 FL (ref 81.4–97.8)
MONOCYTES # BLD AUTO: 1.11 K/UL (ref 0–0.85)
MONOCYTES NFR BLD AUTO: 8.9 % (ref 0–13.4)
NEUTROPHILS # BLD AUTO: 7.55 K/UL (ref 2–7.15)
NEUTROPHILS NFR BLD: 60.6 % (ref 44–72)
NRBC # BLD AUTO: 0 K/UL
NRBC BLD-RTO: 0 /100 WBC
PLATELET # BLD AUTO: 277 K/UL (ref 164–446)
PMV BLD AUTO: 10.5 FL (ref 9–12.9)
POTASSIUM SERPL-SCNC: 4 MMOL/L (ref 3.6–5.5)
RBC # BLD AUTO: 5.11 M/UL (ref 4.2–5.4)
SODIUM SERPL-SCNC: 137 MMOL/L (ref 135–145)
WBC # BLD AUTO: 12.5 K/UL (ref 4.8–10.8)

## 2018-06-04 PROCEDURE — 97535 SELF CARE MNGMENT TRAINING: CPT

## 2018-06-04 PROCEDURE — 700102 HCHG RX REV CODE 250 W/ 637 OVERRIDE(OP): Performed by: HOSPITALIST

## 2018-06-04 PROCEDURE — 85025 COMPLETE CBC W/AUTO DIFF WBC: CPT

## 2018-06-04 PROCEDURE — 80048 BASIC METABOLIC PNL TOTAL CA: CPT

## 2018-06-04 PROCEDURE — 83735 ASSAY OF MAGNESIUM: CPT

## 2018-06-04 PROCEDURE — A9270 NON-COVERED ITEM OR SERVICE: HCPCS | Performed by: HOSPITALIST

## 2018-06-04 PROCEDURE — 36415 COLL VENOUS BLD VENIPUNCTURE: CPT

## 2018-06-04 PROCEDURE — 99239 HOSP IP/OBS DSCHRG MGMT >30: CPT | Performed by: INTERNAL MEDICINE

## 2018-06-04 RX ORDER — ASPIRIN 325 MG
325 TABLET ORAL DAILY
Qty: 100 TAB | Refills: 2 | Status: SHIPPED | OUTPATIENT
Start: 2018-06-05 | End: 2018-06-08

## 2018-06-04 RX ORDER — ATORVASTATIN CALCIUM 80 MG/1
80 TABLET, FILM COATED ORAL
Qty: 30 TAB | Refills: 2 | Status: SHIPPED | OUTPATIENT
Start: 2018-06-04 | End: 2020-10-30

## 2018-06-04 RX ORDER — NICOTINE 21 MG/24HR
1 PATCH, TRANSDERMAL 24 HOURS TRANSDERMAL EVERY 24 HOURS
Qty: 30 PATCH | Refills: 2 | Status: SHIPPED | OUTPATIENT
Start: 2018-06-04 | End: 2020-10-30

## 2018-06-04 RX ADMIN — ASPIRIN 325 MG: 325 TABLET ORAL at 08:56

## 2018-06-04 ASSESSMENT — COGNITIVE AND FUNCTIONAL STATUS - GENERAL
PERSONAL GROOMING: A LITTLE
DAILY ACTIVITIY SCORE: 20
DRESSING REGULAR LOWER BODY CLOTHING: A LITTLE
SUGGESTED CMS G CODE MODIFIER DAILY ACTIVITY: CJ
TOILETING: A LITTLE
HELP NEEDED FOR BATHING: A LITTLE

## 2018-06-04 ASSESSMENT — ENCOUNTER SYMPTOMS
SHORTNESS OF BREATH: 0
LOSS OF CONSCIOUSNESS: 0
COUGH: 0
PALPITATIONS: 0
DIZZINESS: 0
MYALGIAS: 0

## 2018-06-04 ASSESSMENT — PAIN SCALES - GENERAL
PAINLEVEL_OUTOF10: 0
PAINLEVEL_OUTOF10: 0

## 2018-06-04 NOTE — DISCHARGE PLANNING
Received Choice form at 1611 from Tim)  Agency/Facility Name: [facility]  Referral sent to [facility] per Choice form @  1611.

## 2018-06-04 NOTE — PROGRESS NOTES
"Cardiology Progress Note               Author: Segrei Sung Date & Time created: 2018  3:16 PM     Interval History:  42-year-old female admitted with severe intractable nausea vomiting and vertigo with a subsequent diagnosis of an acute cerebellar infarct and a DANICA showing a atrial septal aneurysm with shunt.  Negative workup for thromboembolic disease.  Started on aspirin.   symptomatically significantly improved since admission.  The patient reports a history of intermittent palpitations and \"flutters\".  No history of atrial arrhythmias such as atrial fibrillation.    Chief Complaint:  Nausea and vomiting    Review of Systems   Respiratory: Negative for cough and shortness of breath.    Cardiovascular: Negative for chest pain and palpitations.   Musculoskeletal: Negative for myalgias.   Neurological: Negative for dizziness and loss of consciousness.       Physical Exam   Constitutional: She is oriented to person, place, and time. She appears well-nourished. No distress.   HENT:   Head: Normocephalic.   Eyes: EOM are normal. No scleral icterus.   Neck: Carotid bruit is not present.   Normal jugular venous pressure.   Cardiovascular: Normal rate, regular rhythm, S1 normal, S2 normal and normal heart sounds.  Exam reveals no gallop and no friction rub.    No murmur heard.  Pulses:       Carotid pulses are 2+ on the right side, and 2+ on the left side.       Radial pulses are 2+ on the right side, and 2+ on the left side.   Pulmonary/Chest: Effort normal and breath sounds normal. She has no wheezes. She has no rhonchi. She has no rales.   Musculoskeletal: She exhibits no edema.   Neurological: She is alert and oriented to person, place, and time.   Skin: Skin is warm and dry.   Psychiatric: She has a normal mood and affect. Her behavior is normal.       Hemodynamics:  Temp (24hrs), Av.9 °C (98.4 °F), Min:36.7 °C (98 °F), Max:37.1 °C (98.8 °F)  Temperature: 36.7 °C (98.1 °F)  Pulse  Av.2  Min: " 58  Max: 109   Blood Pressure: (!) 99/67 (RN Notified.), NIBP: (!) 99/77     Respiratory:    Respiration: 20, Pulse Oximetry: 95 %        RUL Breath Sounds: Clear, RML Breath Sounds: Clear, RLL Breath Sounds: Clear, ANDREW Breath Sounds: Clear, LLL Breath Sounds: Clear  Fluids:  Date 06/04/18 0700 - 06/05/18 0659   Shift 3107-9354 5687-2304 6073-6077 24 Hour Total   I  N  T  A  K  E   P.O. 200   200    Shift Total 200   200   O  U  T  P  U  T   Shift Total       Weight (kg) 106.9 106.9 106.9 106.9          GI/Nutrition:  Orders Placed This Encounter   Procedures   • DIET ORDER     Standing Status:   Standing     Number of Occurrences:   1     Order Specific Question:   Diet:     Answer:   Regular [1]     Lab Results:  Recent Labs      06/02/18   0530  06/03/18   0430  06/04/18   0513   WBC  18.4*  16.4*  12.5*   RBC  4.99  4.99  5.11   HEMOGLOBIN  15.2  15.3  15.8   HEMATOCRIT  45.0  45.1  45.9   MCV  90.2  90.4  89.8   MCH  30.5  30.7  30.9   MCHC  33.8  33.9  34.4   RDW  39.6  41.1  40.2   PLATELETCT  268  245  277   MPV  10.5  10.6  10.5     Recent Labs      06/02/18   0530  06/03/18   0430  06/04/18   0513   SODIUM  135  137  137   POTASSIUM  4.0  3.7  4.0   CHLORIDE  106  107  108   CO2  19*  20  21   GLUCOSE  120*  88  101*   BUN  11  19  16   CREATININE  0.66  0.75  0.68   CALCIUM  9.4  8.7  8.7                         Medical Decision Making, by Problem:  Active Hospital Problems    Diagnosis   • *CVA (cerebral vascular accident) (HCC) [I63.9]   • PFO (patent foramen ovale) [Q21.1]   • Headache [R51]   • Vertigo [R42]   • Lactic acidosis [E87.2]   • Leukocytosis [D72.829]   • Tobacco dependence [F17.200]       Recommendation and plan:  PFO with atrial septal aneurysm.  Cerebellar stroke.  Symptomatically improving.    Plan:  I had a detailed discussion with the patient concerning her cardiac condition.  I will begin making a referral to  [facility] for PFO closure.  The patient may require  extended cardiac monitoring to rule out paroxysmal atrial fibrillation.  Could be discharged from a cardiac standpoint.  Will sign off.  I will make arrangements for the patient to follow-up with cardiology in the next 2-3 weeks.    Quality-Core Measures   Reviewed items::  EKG reviewed, Radiology images reviewed, Labs reviewed and Medications reviewed

## 2018-06-04 NOTE — DISCHARGE PLANNING
Anticipated Discharge Disposition: Home with home health    Action: LSW was notified that MD ordered home health for pt and pt is discharging today. GALINDO met with pt at bedside and provided choice form. Pt chosen [facility] as #1 choice and [facility] #2 choice.     Barriers to Discharge: None    Plan: Pt projected to discharge home with home health services.

## 2018-06-04 NOTE — PROGRESS NOTES
Pt transferred to the floor via wheel chair. She is alert and oriented times four. On room air, sats within normal range. Denies pain or needs. NIH 2. She is up with standby/hand held assist. Bed alarm on. Call light and personal items within reach. Plan of care reviewed. Voiced understanding.    2RN skin check completed with Sharon MAYORGA. See in flow sheets for details.

## 2018-06-04 NOTE — CARE PLAN
Problem: Infection  Goal: Will remain free from infection    Intervention: Implement standard precautions and perform hand washing before and after patient contact  Hand hygiene performed before and after interacting with pt.      Problem: Mobility  Goal: Risk for activity intolerance will decrease    Intervention: Encourage patient to increase activity level in collaboration with Interdisciplinary Team  Pt ambulating with 1 assist and fww.

## 2018-06-04 NOTE — DISCHARGE SUMMARY
Discharge Summary    CHIEF COMPLAINT ON ADMISSION  Chief Complaint   Patient presents with   • Dizziness   • Nausea/Vomiting/Diarrhea       Reason for Admission  N/V     Admission Date  5/28/2018    CODE STATUS  Full Code    HPI & HOSPITAL COURSE  This is a 42 y.o. female here with a history of hyperlipidemia and tobacco dependence.  She presented 5/28/2018 with acute onset of symptoms which included nausea, vomiting, vertigo.  Workup in the emergency room demonstrated that she had a leukocytosis and lactic acidosis.  The admitting physician ordered an MRI of the brain to evaluate for acute onset of new vertigo.  MRI on 5/28/2018 demonstrated acute left cerebellar infarct in the PICA territory.  There is also some concern on the MRI for left vertebral artery dissection.  A follow-up CT scan of the head and neck did not show evidence of vertebral artery dissection.  Due to the size of the stroke, patient was moved to the ICU for close monitoring as she was at risk for posterior fossa edema.  Additional stroke workup included an echocardiogram which demonstrated a PFO.  DANICA on 6/1/2018 confirmed PFO. She has been seen by cardiology with recommendation to follow up with  [facility] for PFO closure. She has worked with therapy with improvement of stability. She will benefit from further therapy with home health assistance. She has been started on aspirin and statin and it at its bites to continue. At this point patient's symptoms have significantly improved and will be discharged home.       Therefore, she is discharged in good and stable condition to home with organized home healthcare and close outpatient follow-up. Status with , maximilian for discharge to home once home health initiated, does not need confirmation before discharge home. Patient is advised to follow-up with her primary care provider, Dr. Pearson for further assistance.    The patient met 2-midnight criteria for an inpatient stay at the time of  "discharge.    Discharge Date  June 4, 2018    FOLLOW UP ITEMS POST DISCHARGE  Follow-up with cardiology in 2-3 weeks  Follow-up with primary care provider/discharge clinic in one week  Will need referral to [facility] to assist    DISCHARGE DIAGNOSES  Principal Problem:    CVA (cerebral vascular accident) (HCC) POA: Yes  Active Problems:    Vertigo POA: Yes    Lactic acidosis POA: Yes    Headache POA: Yes    PFO (patent foramen ovale) POA: Yes    Tobacco dependence POA: Yes    Leukocytosis POA: Yes  Resolved Problems:    * No resolved hospital problems. *      FOLLOW UP  Future Appointments  Date Time Provider Department Center   6/8/2018 8:00 AM Pineda Weiss M.D. [facility] None   7/18/2018 10:20 AM [facility] None     Kelvin Pearson M.D.  [address]  [address]  [place] [address]  [phone]      Please call to schedule your appointment . Thank you       MEDICATIONS ON DISCHARGE     Medication List      START taking these medications      Instructions   aspirin 325 MG Tabs  Start taking on:  6/5/2018  Commonly known as:  ASA   Take 1 Tab by mouth every day.  Dose:  325 mg     atorvastatin 80 MG tablet  Commonly known as:  LIPITOR   Take 1 Tab by mouth every bedtime.  Dose:  80 mg     nicotine 14 MG/24HR Pt24  Commonly known as:  NICODERM   Apply 1 Patch to skin as directed every 24 hours.  Dose:  1 Patch        CONTINUE taking these medications      Instructions   ibuprofen 200 MG Tabs  Commonly known as:  MOTRIN   Take 400-600 mg by mouth every 6 hours as needed for Mild Pain.  Dose:  400-600 mg        STOP taking these medications    furosemide 20 MG Tabs  Commonly known as:  LASIX     potassium chloride SA 20 MEQ Tbcr  Commonly known as:  Kdur            Allergies  Allergies   Allergen Reactions   • Bee Anaphylaxis   • Augmentin Rash     Pt states \"I get a bad rash\".     • Latex Rash     Pt states \"I get a bad rash\".   • Neosporin Lip Health Hives     Blisters and swelling at " "site   • Other Misc Rash     Nail acrylic- Pt states \"I get a bad rash\".   • Sulfa Drugs Hives, Itching and Swelling   • Tape Rash     Pt states \"I get a bad rash\".  Paper tape ok       DIET  Orders Placed This Encounter   Procedures   • DIET ORDER     Standing Status:   Standing     Number of Occurrences:   1     Order Specific Question:   Diet:     Answer:   Regular [1]       ACTIVITY  As tolerated.  Weight bearing as tolerated    CONSULTATIONS  Cardiology  Pulmonary/critical care  Neurology      PROCEDURES  DANICA    LABORATORY  Lab Results   Component Value Date    SODIUM 137 06/04/2018    POTASSIUM 4.0 06/04/2018    CHLORIDE 108 06/04/2018    CO2 21 06/04/2018    GLUCOSE 101 (H) 06/04/2018    BUN 16 06/04/2018    CREATININE 0.68 06/04/2018        Lab Results   Component Value Date    WBC 12.5 (H) 06/04/2018    HEMOGLOBIN 15.8 06/04/2018    HEMATOCRIT 45.9 06/04/2018    PLATELETCT 277 06/04/2018        Total time of the discharge process exceeds 45 minutes.  "

## 2018-06-04 NOTE — PROGRESS NOTES
Pt AOx4 on RA, tolerating regular diet. R side weakness noted. Ambulates with 1 assist and fww. Pt had questions about discharge today. Advise to wait for hospitalist to round to see whether she was clear to go home. Pt agreeable. Plans to shower today. Call light w/in reach, hourly rounding in place.

## 2018-06-04 NOTE — DISCHARGE INSTRUCTIONS
Discharge Instructions    Discharged to home by car with relative. Discharged via wheelchair, hospital escort: Refused.  Special equipment needed: Not Applicable    Be sure to schedule a follow-up appointment with your primary care doctor or any specialists as instructed.     Discharge Plan:   Activity Level: Discussed  Smoking Cessation Offered: Patient Counseled  Confirmed Symptoms Management: Discussed  Medication Reconciliation Updated: Yes  Pneumococcal Vaccine Administered/Refused: Not given - Patient refused pneumococcal vaccine  Influenza Vaccine Indication: Patient Refuses    I understand that a diet low in cholesterol, fat, and sodium is recommended for good health. Unless I have been given specific instructions below for another diet, I accept this instruction as my diet prescription.   Other diet: regular healthy diet    Special Instructions:     Stroke/CVA/TIA/Hemorrhagic Ischemia Discharge Instructions  You have had a stroke. Your risk factors have been identified as follows:  High blood pressure  Smoking  High Cholesterol and lipids  It is important that you reduce your risk factors to avoid another stroke in the future. Here are some general guidelines to follow:  · Eat healthy - avoid food high in fat.  · Get regular exercise.  · Maintain a healthy weight.  · Avoid smoking.  · Avoid alcohol and illegal drug use.  · Take your medications as directed.  For more information regarding risk factors, refer to pages 17-19 in your Stroke Patient Education Guide. Stroke Education Guide was given to patient.    Warning signs of a stroke include (which can also be found on page 3 of your Stroke Patient Education Guide):  · Sudden numbness of weakness of the face, arm or leg (especially on one side of the body).  · Sudden confusion, trouble speaking or understanding.  · Sudden trouble seeing in one or both eyes.  · Sudden trouble walking, dizziness, loss of balance or coordination.  · Sudden severe headache  with no known cause.  It is very important to get treatment quickly when a stroke occurs. If you experience any of the above warning signs, call [phone] immediately.     Some patients who have had a stroke will be going home on a blood thinner medication called Warfarin (Coumadin).  This medication requires very close monitoring and follow up.  This follow up can be provided by either your Primary Care Physician or by [facility].  The Outpatient Anticoagulation Service is located at the [facility] at [facility] ([facility]).  If you do not know when your follow up appointment is scheduled, call [phone] to verify your appointment time.      · Is patient discharged on Warfarin / Coumadin?   No     Depression / Suicide Risk    As you are discharged from this [facility], it is important to learn how to keep safe from harming yourself.    Recognize the warning signs:  · Abrupt changes in personality, positive or negative- including increase in energy   · Giving away possessions  · Change in eating patterns- significant weight changes-  positive or negative  · Change in sleeping patterns- unable to sleep or sleeping all the time   · Unwillingness or inability to communicate  · Depression  · Unusual sadness, discouragement and loneliness  · Talk of wanting to die  · Neglect of personal appearance   · Rebelliousness- reckless behavior  · Withdrawal from people/activities they love  · Confusion- inability to concentrate     If you or a loved one observes any of these behaviors or has concerns about self-harm, here's what you can do:  · Talk about it- your feelings and reasons for harming yourself  · Remove any means that you might use to hurt yourself (examples: pills, rope, extension cords, firearm)  · Get professional help from the community (Mental Health, Substance Abuse, psychological counseling)  · Do not be alone:Call  your Safe Contact- someone whom you trust who will be there for you.  · Call your local CRISIS HOTLINE 292-0717 or 664-283-4846  · Call your local Children's Mobile Crisis Response Team [place] (191) 199-0834 or www.[place]  · Call the toll free National Suicide Prevention Hotlines   · National Suicide Prevention Lifeline 181-979-HHSP (8531)  · National Netviewer Line Network 800-SUICIDE (335-6012)

## 2018-06-04 NOTE — CARE PLAN
Problem: Safety  Goal: Will remain free from injury  Safety interventions in place    Problem: Mobility  Goal: Risk for activity intolerance will decrease  Patient will mobilize x3

## 2018-06-04 NOTE — FACE TO FACE
Face to Face Note  -  Durable Medical Equipment    Rajani Carson D.O. - NPI: [id number]  I certify that this patient is under my care and that they have had a durable medical equipment(DME)face to face encounter by myself that meets the physician DME face-to-face encounter requirements with this patient on:    Date of encounter:   Patient:                    MRN:                       YOB: 2019  Bria Martinez  [id number]  1975     The encounter with the patient was in whole, or in part, for the following medical condition, which is the primary reason for durable medical equipment:  CVA    I certify that, based on my findings, the following durable medical equipment is medically necessary:  Walkers.    HOME O2 Saturation Measurements:(Values must be present for Home Oxygen orders)         ,     ,         My Clinical findings support the need for the above equipment due to:  Abnormal Gait    Supporting Symptoms: weakness     ------------------------------------------------------------------------------------------------------------------    Face to Face Supporting Documentation - Home Health    The encounter with this patient was in whole or in part the primary reason for home health admission.    Date of encounter:   Patient:                    MRN:                       YOB: 2019  Bria Martinez  [id number]  1975     Home health to see patient for:  Skilled Nursing care for assessment, interventions & education, Physical Therapy evaluation and treatment and Occupational therapy evaluation and treatment    Skilled need for:  New Onset Medical Diagnosis cva    Skilled nursing interventions to include:  Comment: pt/ot    Homebound evidenced status by:  Need the aid of supportive devices such as crutches, canes, wheelchairs or walkers or Needs the assistance of another person in order to leave the home. Leaving home must require a considerable and taxing  effort. There must exist a normal inability to leave the home.    Community Physician to provide follow up care: Kelvin Pearson M.D.     Optional Interventions    Wound information & treatment:    Home Infusion Therapy orders:    Line/Drain/Airway:    I certify the face to face encounter for this home care referral meets the CMS requirements and the encounter/clinical assessment with the patient was, in whole, or in part, for the medical condition(s) listed above, which is the primary reason for home health care. Based on my clinical findings: the service(s) are medically necessary, support the need for home health care, and the homebound criteria are met.  I certify that this patient has had a face to face encounter by myself.  Rajani Carson D.O. - NPI: [id number]    *Debility, frailty and advanced age in the absence of an acute deterioration or exacerbation of a condition do not qualify a patient for home health.

## 2018-06-04 NOTE — CARE PLAN
Problem: Safety  Goal: Will remain free from injury  Outcome: PROGRESSING AS EXPECTED  Calls for help. Bed alarm on. Frequent monitoring by staff.    Problem: Mobility  Goal: Risk for activity intolerance will decrease  Outcome: PROGRESSING SLOWER THAN EXPECTED  Pt has mild ataxic gait. Up with one. Calls for help.

## 2018-06-04 NOTE — THERAPY
"Occupational Therapy Treatment completed with focus on ADLs and ADL transfers.  Functional Status:  Close SPV grooming standing at sink, SPV full body dressing, Close SPV toileting, SBA functional mobility   Plan of Care: Will benefit from Occupational Therapy 5 times per week  Discharge Recommendations:  Equipment Will Continue to Assess for Equipment Needs. Post-acute therapy Discharge to home with outpatient or home health for additional skilled therapy services.    See \"Rehab Therapy-Acute\" Patient Summary Report for complete documentation.     Pt seen for OT tx on this date. Pt. demo'd increased balance, activity tolerance and functional mobility on this date. Pt performed Full body dressing w/ close SPV- donned robe and shoes, stood at sink and washed face w/ close SPV, and performed toileting ADL w/ Close SPV. Pt. Performed functional mobility w/ FWW and SBA. Pt reports that she plans on staying with her parents for a couple of days once DC'd from Acute Care. Will continue to follow for Acute OT services to ensure safe transition home.   "

## 2018-06-05 NOTE — DISCHARGE PLANNING
Spoke To: Teressa  Agency/Facility Name: [facility]  Plan or Request: Per Teressa they have accepted patient on service.

## 2018-06-08 ENCOUNTER — OFFICE VISIT (OUTPATIENT)
Dept: CARDIOLOGY | Facility: MEDICAL CENTER | Age: 43
End: 2018-06-08
Payer: COMMERCIAL

## 2018-06-08 VITALS
SYSTOLIC BLOOD PRESSURE: 80 MMHG | BODY MASS INDEX: 41.77 KG/M2 | HEIGHT: 62 IN | OXYGEN SATURATION: 95 % | WEIGHT: 227 LBS | HEART RATE: 88 BPM | DIASTOLIC BLOOD PRESSURE: 60 MMHG

## 2018-06-08 DIAGNOSIS — F17.200 TOBACCO DEPENDENCE: ICD-10-CM

## 2018-06-08 DIAGNOSIS — E78.00 HYPERCHOLESTEROLEMIA: ICD-10-CM

## 2018-06-08 DIAGNOSIS — Q21.12 PFO (PATENT FORAMEN OVALE): ICD-10-CM

## 2018-06-08 DIAGNOSIS — I63.432 CEREBROVASCULAR ACCIDENT (CVA) DUE TO EMBOLISM OF LEFT POSTERIOR CEREBRAL ARTERY (HCC): ICD-10-CM

## 2018-06-08 PROCEDURE — 99214 OFFICE O/P EST MOD 30 MIN: CPT | Performed by: INTERNAL MEDICINE

## 2018-06-08 RX ORDER — CLOPIDOGREL BISULFATE 75 MG/1
75 TABLET ORAL DAILY
Qty: 30 TAB | Refills: 11 | Status: SHIPPED | OUTPATIENT
Start: 2018-06-08 | End: 2019-06-13 | Stop reason: SDUPTHER

## 2018-06-08 ASSESSMENT — ENCOUNTER SYMPTOMS
DIAPHORESIS: 1
BLURRED VISION: 0
SORE THROAT: 0
SHORTNESS OF BREATH: 1
PHOTOPHOBIA: 1
COUGH: 0
ABDOMINAL PAIN: 0
DIZZINESS: 1
FOCAL WEAKNESS: 0
CLAUDICATION: 0
CHILLS: 0
FALLS: 0
HEARTBURN: 1
WEAKNESS: 0
HEADACHES: 1
FEVER: 0
PALPITATIONS: 0
NAUSEA: 1
BRUISES/BLEEDS EASILY: 0
PND: 0

## 2018-06-08 NOTE — LETTER
[facility]   [address]  [place] [address]  Phone: [phone]  Fax: [phone]              Bria Martinez  1975    Encounter Date: 6/8/2018    Pineda Weiss M.D.          PROGRESS NOTE:  Chief Complaint   Patient presents with   • Chest Pain     new patient / pp of CR/ establish with Dr Weiss       Subjective:   Bria Martinez is a 42 y.o. female who presents today for follow-up of recent left cerebellar stroke where she was found to have a PFO    Still has issues with balance but improving    Past Medical History:   Diagnosis Date   • Acid reflux disease 8/8/2012   • Anesthesia 8-9-2017    uncontrollable shaking   • Breath shortness 8-9-2017    no oxygen usage   • CHEST PAIN 8/8/2012   • Dental disorder 8-9-2017    upper partial   • Gynecological disorder    • Heart burn    • Hypercholesterolemia 8/8/2012   • Psychiatric problem 8-9-2017    anxiety; and panic attacks   • Tobacco dependence quit 2018 in setting of CVA    • Urinary bladder disorder 8-9-2017    leakage with sneezing/movement     Past Surgical History:   Procedure Laterality Date   • VAGINAL HYSTERECTOMY SCOPE TOTAL  8/23/2017    Procedure: VAGINAL HYSTERECTOMY SCOPE TOTAL ;  Surgeon: Davin Liriano M.D.;  Location: SURGERY [facility];  Service:    • SALPINGECTOMY Bilateral 8/23/2017    Procedure: SALPINGECTOMY;  Surgeon: Davin Liriano M.D.;  Location: SURGERY [facility];  Service:    • CHOLECYSTECTOMY  2009   • APPENDECTOMY     • BREAST BIOPSY Left    • TONSILLECTOMY       No family history on file.  Social History     Social History   • Marital status: Single     Spouse name: N/A   • Number of children: N/A   • Years of education: N/A     Occupational History   • Not on file.     Social History Main Topics   • Smoking status: Former Smoker     Packs/day: 1.00     Years: 20.00     Types: Cigarettes     Quit date: 5/28/2018   • Smokeless tobacco:  "Never Used   • Alcohol use Yes      Comment: RARELY   • Drug use: No   • Sexual activity: Not on file     Other Topics Concern   • Not on file     Social History Narrative   • No narrative on file     Allergies   Allergen Reactions   • Bee Anaphylaxis   • Augmentin Rash     Pt states \"I get a bad rash\".     • Latex Rash     Pt states \"I get a bad rash\".   • Neosporin Lip Health Hives     Blisters and swelling at site   • Other Misc Rash     Nail acrylic- Pt states \"I get a bad rash\".   • Sulfa Drugs Hives, Itching and Swelling   • Tape Rash     Pt states \"I get a bad rash\".  Paper tape ok     Outpatient Encounter Prescriptions as of 6/8/2018   Medication Sig Dispense Refill   • clopidogrel (PLAVIX) 75 MG Tab Take 1 Tab by mouth every day. 30 Tab 11   • atorvastatin (LIPITOR) 80 MG tablet Take 1 Tab by mouth every bedtime. 30 Tab 2   • nicotine (NICODERM) 14 MG/24HR PATCH 24 HR Apply 1 Patch to skin as directed every 24 hours. 30 Patch 2   • ibuprofen (MOTRIN) 200 MG Tab Take 400-600 mg by mouth every 6 hours as needed for Mild Pain.     • [DISCONTINUED] aspirin (ASA) 325 MG Tab Take 1 Tab by mouth every day. 100 Tab 2     No facility-administered encounter medications on file as of 6/8/2018.      Review of Systems   Constitutional: Positive for diaphoresis and malaise/fatigue. Negative for chills and fever.   HENT: Positive for congestion. Negative for sore throat.    Eyes: Positive for photophobia. Negative for blurred vision.   Respiratory: Positive for shortness of breath. Negative for cough.    Cardiovascular: Positive for leg swelling. Negative for chest pain, palpitations, claudication and PND.   Gastrointestinal: Positive for heartburn and nausea. Negative for abdominal pain.   Genitourinary: Positive for urgency.   Musculoskeletal: Negative for falls and joint pain.   Skin: Positive for rash.   Neurological: Positive for dizziness and headaches. Negative for focal weakness and weakness.   " "  Endo/Heme/Allergies: Positive for environmental allergies. Does not bruise/bleed easily.        Objective:   BP (!) 80/60   Pulse 88   Ht 1.575 m (5' 2\")   Wt 103 kg (227 lb)   SpO2 95%   BMI 41.52 kg/m²      Physical Exam   Constitutional: No distress.   Walks with a cane   HENT:   Mouth/Throat: Oropharynx is clear and moist. No oropharyngeal exudate.   Eyes: No scleral icterus.   Neck: No JVD present.   Cardiovascular: Normal rate and normal heart sounds.  Exam reveals no gallop and no friction rub.    No murmur heard.  Pulmonary/Chest: No respiratory distress. She has no wheezes. She has no rales.   Abdominal: Soft. Bowel sounds are normal.   Musculoskeletal: She exhibits no edema.   Neurological: She is alert.   Skin: No rash noted. She is not diaphoretic.   Psychiatric: She has a normal mood and affect.     we reviewed the images of her echo and transesophageal echo    Assessment:     1. PFO (patent foramen ovale)  REFERRAL TO CARDIOLOGY   2. Tobacco dependence     3. Hypercholesterolemia     4. Cerebrovascular accident (CVA) due to embolism of left posterior cerebral artery (HCC)  REFERRAL TO CARDIOLOGY       Medical Decision Making:  Today's Assessment / Status / Plan:     It was my pleasure to meet with Ms. Martinez.    She has a PFO with atrial septal aneurysm on DANICA and may benefit from closure device I am not sure with her anatomy if that is feasible, I have reached out to a local cardiologist to see if this is performed here locally otherwise we could refer down to [place]    As far as aspirin this was not commented on by the neurologist but I believe given the PFO it is more reasonable to take Plavix the trials do not suggest benefit for anticoagulation or antiplatelets    She will need to follow-up with neurology long-term and if PFO closure is available likely follow-up with them    Ms. Martinez does not require regular cardiology follow up with [facility], I have advised her to call our " office or e-mail using my MyChart if needed.    It is my pleasure to participate in the care of Ms. Martinez.  Please do not hesitate to contact me with questions or concerns.    Pineda Weiss MD PhD [facility]  Cardiologist [facility]              Kelvin Pearson M.D.  [address] [place] [address] [address]  [address]  [place] [address]  VIA Facsimile: [phone]

## 2018-06-09 NOTE — PROGRESS NOTES
Chief Complaint   Patient presents with   • Chest Pain     new patient / pp of CR/ establish with Dr Weiss       Subjective:   Bria Martinez is a 42 y.o. female who presents today for follow-up of recent left cerebellar stroke where she was found to have a PFO    Still has issues with balance but improving    Past Medical History:   Diagnosis Date   • Acid reflux disease 8/8/2012   • Anesthesia 8-9-2017    uncontrollable shaking   • Breath shortness 8-9-2017    no oxygen usage   • CHEST PAIN 8/8/2012   • Dental disorder 8-9-2017    upper partial   • Gynecological disorder    • Heart burn    • Hypercholesterolemia 8/8/2012   • Psychiatric problem 8-9-2017    anxiety; and panic attacks   • Tobacco dependence quit 2018 in setting of CVA    • Urinary bladder disorder 8-9-2017    leakage with sneezing/movement     Past Surgical History:   Procedure Laterality Date   • VAGINAL HYSTERECTOMY SCOPE TOTAL  8/23/2017    Procedure: VAGINAL HYSTERECTOMY SCOPE TOTAL ;  Surgeon: Davin Liriano M.D.;  Location: SURGERY [facility];  Service:    • SALPINGECTOMY Bilateral 8/23/2017    Procedure: SALPINGECTOMY;  Surgeon: Davin Liriano M.D.;  Location: SURGERY [facility];  Service:    • CHOLECYSTECTOMY  2009   • APPENDECTOMY     • BREAST BIOPSY Left    • TONSILLECTOMY       No family history on file.  Social History     Social History   • Marital status: Single     Spouse name: N/A   • Number of children: N/A   • Years of education: N/A     Occupational History   • Not on file.     Social History Main Topics   • Smoking status: Former Smoker     Packs/day: 1.00     Years: 20.00     Types: Cigarettes     Quit date: 5/28/2018   • Smokeless tobacco: Never Used   • Alcohol use Yes      Comment: RARELY   • Drug use: No   • Sexual activity: Not on file     Other Topics Concern   • Not on file     Social History Narrative   • No narrative on file     Allergies   Allergen Reactions   • Bee Anaphylaxis   • Augmentin Rash      "Pt states \"I get a bad rash\".     • Latex Rash     Pt states \"I get a bad rash\".   • Neosporin Lip Health Hives     Blisters and swelling at site   • Other Misc Rash     Nail acrylic- Pt states \"I get a bad rash\".   • Sulfa Drugs Hives, Itching and Swelling   • Tape Rash     Pt states \"I get a bad rash\".  Paper tape ok     Outpatient Encounter Prescriptions as of 6/8/2018   Medication Sig Dispense Refill   • clopidogrel (PLAVIX) 75 MG Tab Take 1 Tab by mouth every day. 30 Tab 11   • atorvastatin (LIPITOR) 80 MG tablet Take 1 Tab by mouth every bedtime. 30 Tab 2   • nicotine (NICODERM) 14 MG/24HR PATCH 24 HR Apply 1 Patch to skin as directed every 24 hours. 30 Patch 2   • ibuprofen (MOTRIN) 200 MG Tab Take 400-600 mg by mouth every 6 hours as needed for Mild Pain.     • [DISCONTINUED] aspirin (ASA) 325 MG Tab Take 1 Tab by mouth every day. 100 Tab 2     No facility-administered encounter medications on file as of 6/8/2018.      Review of Systems   Constitutional: Positive for diaphoresis and malaise/fatigue. Negative for chills and fever.   HENT: Positive for congestion. Negative for sore throat.    Eyes: Positive for photophobia. Negative for blurred vision.   Respiratory: Positive for shortness of breath. Negative for cough.    Cardiovascular: Positive for leg swelling. Negative for chest pain, palpitations, claudication and PND.   Gastrointestinal: Positive for heartburn and nausea. Negative for abdominal pain.   Genitourinary: Positive for urgency.   Musculoskeletal: Negative for falls and joint pain.   Skin: Positive for rash.   Neurological: Positive for dizziness and headaches. Negative for focal weakness and weakness.   Endo/Heme/Allergies: Positive for environmental allergies. Does not bruise/bleed easily.        Objective:   BP (!) 80/60   Pulse 88   Ht 1.575 m (5' 2\")   Wt 103 kg (227 lb)   SpO2 95%   BMI 41.52 kg/m²     Physical Exam   Constitutional: No distress.   Walks with a cane   HENT: "   Mouth/Throat: Oropharynx is clear and moist. No oropharyngeal exudate.   Eyes: No scleral icterus.   Neck: No JVD present.   Cardiovascular: Normal rate and normal heart sounds.  Exam reveals no gallop and no friction rub.    No murmur heard.  Pulmonary/Chest: No respiratory distress. She has no wheezes. She has no rales.   Abdominal: Soft. Bowel sounds are normal.   Musculoskeletal: She exhibits no edema.   Neurological: She is alert.   Skin: No rash noted. She is not diaphoretic.   Psychiatric: She has a normal mood and affect.     we reviewed the images of her echo and transesophageal echo    Assessment:     1. PFO (patent foramen ovale)  REFERRAL TO CARDIOLOGY   2. Tobacco dependence     3. Hypercholesterolemia     4. Cerebrovascular accident (CVA) due to embolism of left posterior cerebral artery (HCC)  REFERRAL TO CARDIOLOGY       Medical Decision Making:  Today's Assessment / Status / Plan:     It was my pleasure to meet with Ms. Martinez.    She has a PFO with atrial septal aneurysm on DANICA and may benefit from closure device I am not sure with her anatomy if that is feasible, I have reached out to a local cardiologist to see if this is performed here locally otherwise we could refer down to [place]    As far as aspirin this was not commented on by the neurologist but I believe given the PFO it is more reasonable to take Plavix the trials do not suggest benefit for anticoagulation or antiplatelets    She will need to follow-up with neurology long-term and if PFO closure is available likely follow-up with them    Ms. Martinez does not require regular cardiology follow up with [facility] cardiology, I have advised her to call our office or e-mail using my [employer] if needed.    It is my pleasure to participate in the care of Ms. Martinez.  Please do not hesitate to contact me with questions or concerns.    Pineda Weiss MD PhD FACC  Cardiologist [facility]

## 2018-06-11 ENCOUNTER — TELEPHONE (OUTPATIENT)
Dept: CARDIOLOGY | Facility: MEDICAL CENTER | Age: 43
End: 2018-06-11

## 2018-06-11 NOTE — TELEPHONE ENCOUNTER
Elisabeth Camargo   Sent: Mon June 11, 2018 10:51 AM   To: Jolanta Ochoa R.N.                  Message     The referral to Cardiology will be sent to [facility]. The contact number is [phone].     Noted.

## 2018-06-28 ENCOUNTER — OFFICE VISIT (OUTPATIENT)
Dept: NEUROLOGY | Facility: MEDICAL CENTER | Age: 43
End: 2018-06-28
Payer: COMMERCIAL

## 2018-06-28 VITALS
HEIGHT: 62 IN | HEART RATE: 90 BPM | WEIGHT: 230.93 LBS | OXYGEN SATURATION: 95 % | TEMPERATURE: 97.3 F | SYSTOLIC BLOOD PRESSURE: 128 MMHG | DIASTOLIC BLOOD PRESSURE: 72 MMHG | BODY MASS INDEX: 42.5 KG/M2

## 2018-06-28 DIAGNOSIS — I63.9 CEREBROVASCULAR ACCIDENT (CVA), UNSPECIFIED MECHANISM (HCC): ICD-10-CM

## 2018-06-28 PROCEDURE — 99215 OFFICE O/P EST HI 40 MIN: CPT | Performed by: PHYSICIAN ASSISTANT

## 2018-06-28 NOTE — PATIENT INSTRUCTIONS
"DX:  Stroke       Personal Risk factors associated with recurrent stroke:    Some risk factors for stroke are \"non-modifiable\" meaning we cannot change them.  Examples of these types of risk factors are:    *   Age - once we turn 55, stroke becomes more common.  It is associated with aging and in fact every decade over 55 our stroke risk doubles.  * Gender - Men have more strokes than women, although this gender difference is only slight  * Ethnicity and/or family history:  if your parents, grandparents, siblings or children have had stroke or if you have ancestors of , , or  descent, you may have inherited some genes pre-disposing your toward stroke    To reduce your risk of recurrent stroke, we want to focus on risk factors we can modify.  Any of the checked items below are your personal risk factors and you should work with your PCP (primary care provider) to get them to the goal (goals are in bold).   These risk factors are:     ___Diabetes - goal HA1c is <7.0%: Current:    6.0     ___Hypertension - goal is <140.   Current: 128/72    _X__Hyperlipidemia - goal LDL is <70.  Current: 152.  Continue atorvastatin 80 mg until your LDL is <target - have Dr. Ashton monitor    Continue taking cholesterol medication as prescribed.  Modify diet to reduce cholesterol - less cheese, avoid egg yolks, reduce intake of pork, shrimp, and beef.    _X__Smoking: Once you are 5 years out from when you quit smoking, the history of smoking does not appear to be a significant risk factor for recurrent stroke.  If you are quitting, implement stress reduction with exercise such as daily walking, yoga, or meditation.  If you are unable to quit on your own, ask your PCP about chantix or referral for smoking cessation    _X__Overweight:  Current BMI - 41.  Try and lose about 2 pounds a month or 25 pounds.    Body Mass Index (BMI) is your height compared to your weight.  Goal BMI to reduce risk of " recurrent stroke is <25   BMI 25-27 diet/exercise to get to goal BMI.    BMI >27 patient should begin with goal of 10% weight loss  Work on this risk factor aggressively with your PCP             _X__Physical Inactivity: Counseled on initiating 30 minutes of moderate exercise most days, but at least three times per week. Moderate exercise can be walking, swimming, cycling.  Work up to goal by setting realistic goal and then increasing it weekly or monthly.    ___Atrial Fibrillation: no History of afib     ___Sleep Apnea: not diagnosed with sleep apnea.    If you snore and have episodes where you stop breathing, speak to your PCP about whether a pulse oximetry test or referral for a sleep study are needed.   Untreated sleep apnea is associated with recurrent stroke    ___Alcohol or Drug use: denies    ___Estrogen Use: Denies  Current Use - recommend discontinuation as it is associated with increased risk of stroke.  If discontinuing causes reduced quality of life due to symptoms, work with your PCP to take the very lowest dose needed to manage the symptoms (hot flashes, fatigue, etc)      Plan:    Ischemic stroke - mechanism likely cardioembolus (valsalva prior bent over) vs vertebral artery dissection although CT followup did not show dissection (?)    Work with PCP on risk factors checked above to reduce risk of recurrent stroke.    Medication you are taking to thin your blood and reduce your risk of recurrent stroke is plavix    Therapies: just completed.    Counseled on when to call 911 and if desired additional information on stroke was provided    Return to our office: not required.  Follow up with [facility] as scheduled for PFO closure - July 17 appt.

## 2018-06-28 NOTE — PROGRESS NOTES
Subjective:      Bria Martinez is a 42 y.o. female who presents with No chief complaint on file.    Patient had a stroke and was discharged from the hospital earlier this year.      Symptoms associated with this episode:  spinning, nausea, vomiting headache.  Started after she bent over and reached.  Had a chiropractic manipulation previous Tuesday.      Symptoms have resolved except poor balance    Patient was seen in the hospital by neurology.  Dr. Turner     Stroke Prevention Medications taking currently: plavix  (Prior to this event patient was taking following anti-thrombotic: plavix )    (PCP) Primary Doctor:  Kelvin Pearson MD    [facility] reviewed    Medications and Allergies reviewed    Social: lives in [place] alone but boyfriend present most of time   Works  -  for [employer]    Test Results Reviewed:    MRI:  L pica acute ischemic infarct    CTA Head:  No thrombosis is seen within the [place].    1.7 mm outpouching involving the supraclinoid ICA on the left likely represents an infundibulum. Tiny Aneurysm not excluded.    Dominant right vertebral artery.    Evolving left cerebellar infarction.    CTA Neck:  .  There is no evidence of vertebral artery dissection.  2.  There is normal anatomic variant left vertebral artery originating from the aortic arch.  3.  There is estimated less than 50% stenosis at the left vertebral artery origin at the arch likely due to atherosclerosis.  4.  Carotid arteries are unremarkable.    Echo: PFO    Positive bubble study suggestive of PFO.  Normal right and left ventricular size and function.   Left ventricular ejection fraction is visually estimated to be 65%.  Mild mitral regurgitation.  Mild aortic insufficiency.  Normal estimated intracardiac pressures    DANICA:    Left to right shunt via small patent foramen ovale identified with   color Doppler.  Aneurysmal interatrial septum with greater than 10 mm excursion.  Normal left  "ventricular size, wall thickness, and systolic function.  Structurally normal aortic valve without significant stenosis or   regurgitation.  Trace mitral regurgitation.  Structurally normal tricuspid valve without significant stenosis or   regurgitation.      TSH: WNL        DX:  Stroke       Personal Risk factors associated with recurrent stroke:    Some risk factors for stroke are \"non-modifiable\" meaning we cannot change them.  Examples of these types of risk factors are:    *   Age - once we turn 55, stroke becomes more common.  It is associated with aging and in fact every decade over 55 our stroke risk doubles.  * Gender - Men have more strokes than women, although this gender difference is only slight  * Ethnicity and/or family history:  if your parents, grandparents, siblings or children have had stroke or if you have ancestors of , , or  descent, you may have inherited some genes pre-disposing your toward stroke    To reduce your risk of recurrent stroke, we want to focus on risk factors we can modify.  Any of the checked items below are your personal risk factors and you should work with your PCP (primary care provider) to get them to the goal (goals are in bold).   These risk factors are:     ___Diabetes - goal HA1c is <7.0%: Current:    6.0     ___Hypertension - goal is <140.   Current: 128/72    _X__Hyperlipidemia - goal LDL is <70.  Current: 152.  Continue atorvastatin 80 mg until your LDL is <target - have Dr. Ashton monitor    Continue taking cholesterol medication as prescribed.  Modify diet to reduce cholesterol - less cheese, avoid egg yolks, reduce intake of pork, shrimp, and beef.    _X__Smoking: Once you are 5 years out from when you quit smoking, the history of smoking does not appear to be a significant risk factor for recurrent stroke.  If you are quitting, implement stress reduction with exercise such as daily walking, yoga, or meditation.  If you are " unable to quit on your own, ask your PCP about chantix or referral for smoking cessation    _X__Overweight:  Current BMI - 41.  Try and lose about 2 pounds a month or 25 pounds.    Body Mass Index (BMI) is your height compared to your weight.  Goal BMI to reduce risk of recurrent stroke is <25   BMI 25-27 diet/exercise to get to goal BMI.    BMI >27 patient should begin with goal of 10% weight loss  Work on this risk factor aggressively with your PCP             _X__Physical Inactivity: Counseled on initiating 30 minutes of moderate exercise most days, but at least three times per week. Moderate exercise can be walking, swimming, cycling.  Work up to goal by setting realistic goal and then increasing it weekly or monthly.    ___Atrial Fibrillation: no History of afib     ___Sleep Apnea: not diagnosed with sleep apnea.    If you snore and have episodes where you stop breathing, speak to your PCP about whether a pulse oximetry test or referral for a sleep study are needed.   Untreated sleep apnea is associated with recurrent stroke    ___Alcohol or Drug use: denies    ___Estrogen Use: Denies  Current Use - recommend discontinuation as it is associated with increased risk of stroke.  If discontinuing causes reduced quality of life due to symptoms, work with your PCP to take the very lowest dose needed to manage the symptoms (hot flashes, fatigue, etc)                  HPI    ROS       Objective:     There were no vitals taken for this visit.     Physical Exam    Well developed, well nourished - vital signs reviewed  Alert and Oriented x 3, Affect Appropriate, Fund of knowledge within normal limits, Memory intact  Cranial Nerves: EOMI without nystagmus or opthalmoplegia,  face symmetric in strength and sensation, no facial droop, tongue midline without atrophy or fasiculations, shoulder shrug is normal bilaterally, speech clear and fluent no aphasia  Motor:  UpRight leg weakness  Sensation: Light touch equal and intact  in all extremities, No sensory deficits  Cerebellar:  Right arm slow f2n  Gait: normal gait without ataxia.  Negative Romberg  CV: normal carotid pulses bilaterally, RRR no MRG, no peripheral edema                Assessment/Plan:     Ischemic stroke - mechanism likely cardioembolus (valsalva prior bent over) vs vertebral artery dissection although CT followup did not show dissection (?)    Work with PCP on risk factors checked above to reduce risk of recurrent stroke.    Medication you are taking to thin your blood and reduce your risk of recurrent stroke is plavix    Therapies: just completed.    Counseled on when to call 911 and if desired additional information on stroke was provided    Return to our office: not required.  Follow up with [facility] as scheduled for PFO closure - July 17 appt.    Total time with this visit:  40   Minutes face-to-face with patient. More than 50% of this visit was spent educating patient on their illness and/or coordinating care, as detailed above

## 2019-06-13 DIAGNOSIS — I63.432 CEREBROVASCULAR ACCIDENT (CVA) DUE TO EMBOLISM OF LEFT POSTERIOR CEREBRAL ARTERY (HCC): Primary | ICD-10-CM

## 2019-06-13 RX ORDER — CLOPIDOGREL BISULFATE 75 MG/1
75 TABLET ORAL DAILY
Qty: 30 TAB | Refills: 0 | Status: SHIPPED | OUTPATIENT
Start: 2019-06-13 | End: 2020-10-30

## 2019-12-24 ENCOUNTER — OFFICE VISIT (OUTPATIENT)
Dept: URGENT CARE | Facility: PHYSICIAN GROUP | Age: 44
End: 2019-12-24
Payer: COMMERCIAL

## 2019-12-24 VITALS
BODY MASS INDEX: 43.68 KG/M2 | WEIGHT: 238.8 LBS | SYSTOLIC BLOOD PRESSURE: 118 MMHG | TEMPERATURE: 97.5 F | DIASTOLIC BLOOD PRESSURE: 78 MMHG | HEART RATE: 99 BPM | OXYGEN SATURATION: 95 % | RESPIRATION RATE: 16 BRPM

## 2019-12-24 DIAGNOSIS — R05.9 COUGH: ICD-10-CM

## 2019-12-24 DIAGNOSIS — J06.9 URI WITH COUGH AND CONGESTION: ICD-10-CM

## 2019-12-24 LAB
FLUAV+FLUBV AG SPEC QL IA: NEGATIVE
INT CON NEG: NEGATIVE
INT CON POS: POSITIVE

## 2019-12-24 PROCEDURE — 87804 INFLUENZA ASSAY W/OPTIC: CPT | Performed by: FAMILY MEDICINE

## 2019-12-24 PROCEDURE — 99214 OFFICE O/P EST MOD 30 MIN: CPT | Performed by: FAMILY MEDICINE

## 2019-12-24 RX ORDER — BENZONATATE 100 MG/1
100 CAPSULE ORAL 3 TIMES DAILY PRN
Qty: 30 CAP | Refills: 0 | Status: SHIPPED
Start: 2019-12-24 | End: 2020-10-30

## 2019-12-24 NOTE — PATIENT INSTRUCTIONS
"Follow up if not significantly improved as expected, sooner if any worsening or new symptoms      Upper Respiratory Infection, Adult  Most upper respiratory infections (URIs) are caused by a virus. A URI affects the nose, throat, and upper air passages. The most common type of URI is often called \"the common cold.\"  Follow these instructions at home:  · Take medicines only as told by your doctor.  · Gargle warm saltwater or take cough drops to comfort your throat as told by your doctor.  · Use a warm mist humidifier or inhale steam from a shower to increase air moisture. This may make it easier to breathe.  · Drink enough fluid to keep your pee (urine) clear or pale yellow.  · Eat soups and other clear broths.  · Have a healthy diet.  · Rest as needed.  · Go back to work when your fever is gone or your doctor says it is okay.  ¨ You may need to stay home longer to avoid giving your URI to others.  ¨ You can also wear a face mask and wash your hands often to prevent spread of the virus.  · Use your inhaler more if you have asthma.  · Do not use any tobacco products, including cigarettes, chewing tobacco, or electronic cigarettes. If you need help quitting, ask your doctor.  Contact a doctor if:  · You are getting worse, not better.  · Your symptoms are not helped by medicine.  · You have chills.  · You are getting more short of breath.  · You have brown or red mucus.  · You have yellow or brown discharge from your nose.  · You have pain in your face, especially when you bend forward.  · You have a fever.  · You have puffy (swollen) neck glands.  · You have pain while swallowing.  · You have white areas in the back of your throat.  Get help right away if:  · You have very bad or constant:  ¨ Headache.  ¨ Ear pain.  ¨ Pain in your forehead, behind your eyes, and over your cheekbones (sinus pain).  ¨ Chest pain.  · You have long-lasting (chronic) lung disease and any of the following:  ¨ Wheezing.  ¨ Long-lasting " cough.  ¨ Coughing up blood.  ¨ A change in your usual mucus.  · You have a stiff neck.  · You have changes in your:  ¨ Vision.  ¨ Hearing.  ¨ Thinking.  ¨ Mood.  This information is not intended to replace advice given to you by your health care provider. Make sure you discuss any questions you have with your health care provider.  Document Released: 06/05/2009 Document Revised: 08/20/2017 Document Reviewed: 03/25/2015  Elsevier Interactive Patient Education © 2017 [employer]

## 2019-12-24 NOTE — PROGRESS NOTES
Subjective:      Bria Martinez is a 44 y.o. female who presents with Sinus Problem (pain on both ears, sore throat, cough, nasal congestion xcouple of days )            This is a new problem.  44-year-old presenting for 2-day history of sore throat cough and congestion and pain in both ears.  Hurts when she coughs.  She had one dose of leftover antibiotic she took last night.  She is worried about sinus infection.  No fever or chills reported.      Review of Systems   All other systems reviewed and are negative.         Objective:     /78 (BP Location: Right arm, Patient Position: Sitting, BP Cuff Size: Large adult)   Pulse 99   Temp 36.4 °C (97.5 °F) (Temporal)   Resp 16   Wt 108.3 kg (238 lb 12.8 oz)   SpO2 95%   BMI 43.68 kg/m²     Physical Exam  Constitutional:       General: She is not in acute distress.     Appearance: She is not ill-appearing, toxic-appearing or diaphoretic.   HENT:      Head: Normocephalic and atraumatic.      Right Ear: Tympanic membrane, ear canal and external ear normal.      Left Ear: Tympanic membrane, ear canal and external ear normal.      Nose: Congestion present.      Mouth/Throat:      Mouth: Mucous membranes are moist. No oral lesions.      Pharynx: Oropharynx is clear. Uvula midline. No pharyngeal swelling, oropharyngeal exudate, posterior oropharyngeal erythema or uvula swelling.      Tonsils: No tonsillar exudate or tonsillar abscesses.   Eyes:      Conjunctiva/sclera: Conjunctivae normal.   Neck:      Musculoskeletal: Neck supple. No muscular tenderness.   Cardiovascular:      Rate and Rhythm: Normal rate and regular rhythm.      Heart sounds: No murmur. No friction rub. No gallop.    Pulmonary:      Effort: Pulmonary effort is normal. No respiratory distress.      Breath sounds: No stridor. No wheezing, rhonchi or rales.   Lymphadenopathy:      Cervical: No cervical adenopathy.   Skin:     General: Skin is warm.      Coloration: Skin is not jaundiced or  pale.   Neurological:      Mental Status: She is alert and oriented to person, place, and time.   Psychiatric:         Mood and Affect: Mood normal.       Rapid flu is negative          Assessment/Plan:   ASSESSMENT:PLAN:  1. URI with cough and congestion  - benzonatate (TESSALON) 100 MG Cap; Take 1 Cap by mouth 3 times a day as needed for Cough.  Dispense: 30 Cap; Refill: 0    2. Cough  - POCT Influenza A/B    Continue symptomatic care  Plan per orders and instructions  Warning signs reviewed

## 2020-03-20 ENCOUNTER — HOSPITAL ENCOUNTER (OUTPATIENT)
Dept: RADIOLOGY | Facility: MEDICAL CENTER | Age: 45
End: 2020-03-20
Attending: PHYSICIAN ASSISTANT
Payer: COMMERCIAL

## 2020-03-20 DIAGNOSIS — Z12.31 VISIT FOR SCREENING MAMMOGRAM: ICD-10-CM

## 2020-03-20 PROCEDURE — 77067 SCR MAMMO BI INCL CAD: CPT

## 2020-10-30 ENCOUNTER — HOSPITAL ENCOUNTER (EMERGENCY)
Facility: MEDICAL CENTER | Age: 45
End: 2020-10-30
Attending: EMERGENCY MEDICINE
Payer: COMMERCIAL

## 2020-10-30 ENCOUNTER — APPOINTMENT (OUTPATIENT)
Dept: RADIOLOGY | Facility: MEDICAL CENTER | Age: 45
End: 2020-10-30
Attending: EMERGENCY MEDICINE
Payer: COMMERCIAL

## 2020-10-30 ENCOUNTER — OFFICE VISIT (OUTPATIENT)
Dept: URGENT CARE | Facility: PHYSICIAN GROUP | Age: 45
End: 2020-10-30
Payer: COMMERCIAL

## 2020-10-30 VITALS
TEMPERATURE: 98.6 F | WEIGHT: 240.08 LBS | HEIGHT: 63 IN | HEART RATE: 76 BPM | OXYGEN SATURATION: 92 % | RESPIRATION RATE: 15 BRPM | BODY MASS INDEX: 42.54 KG/M2 | SYSTOLIC BLOOD PRESSURE: 119 MMHG | DIASTOLIC BLOOD PRESSURE: 80 MMHG

## 2020-10-30 VITALS
OXYGEN SATURATION: 96 % | WEIGHT: 240 LBS | RESPIRATION RATE: 16 BRPM | TEMPERATURE: 97.1 F | HEIGHT: 64 IN | SYSTOLIC BLOOD PRESSURE: 98 MMHG | BODY MASS INDEX: 40.97 KG/M2 | HEART RATE: 93 BPM | DIASTOLIC BLOOD PRESSURE: 62 MMHG

## 2020-10-30 DIAGNOSIS — R42 VERTIGO: ICD-10-CM

## 2020-10-30 DIAGNOSIS — R42 DIZZINESS: ICD-10-CM

## 2020-10-30 DIAGNOSIS — Z86.73 HISTORY OF CEREBELLAR STROKE: ICD-10-CM

## 2020-10-30 DIAGNOSIS — R11.0 NAUSEA: ICD-10-CM

## 2020-10-30 LAB
ABO GROUP BLD: NORMAL
ALBUMIN SERPL BCP-MCNC: 3.9 G/DL (ref 3.2–4.9)
ALBUMIN/GLOB SERPL: 1.3 G/DL
ALP SERPL-CCNC: 67 U/L (ref 30–99)
ALT SERPL-CCNC: 33 U/L (ref 2–50)
ANION GAP SERPL CALC-SCNC: 10 MMOL/L (ref 7–16)
APTT PPP: 27.2 SEC (ref 24.7–36)
AST SERPL-CCNC: 20 U/L (ref 12–45)
BASOPHILS # BLD AUTO: 0.5 % (ref 0–1.8)
BASOPHILS # BLD: 0.06 K/UL (ref 0–0.12)
BILIRUB SERPL-MCNC: 0.4 MG/DL (ref 0.1–1.5)
BLD GP AB SCN SERPL QL: NORMAL
BUN SERPL-MCNC: 14 MG/DL (ref 8–22)
CALCIUM SERPL-MCNC: 8.6 MG/DL (ref 8.5–10.5)
CHLORIDE SERPL-SCNC: 103 MMOL/L (ref 96–112)
CO2 SERPL-SCNC: 23 MMOL/L (ref 20–33)
COVID ORDER STATUS COVID19: NORMAL
CREAT SERPL-MCNC: 0.61 MG/DL (ref 0.5–1.4)
EKG IMPRESSION: NORMAL
EOSINOPHIL # BLD AUTO: 0.26 K/UL (ref 0–0.51)
EOSINOPHIL NFR BLD: 2.3 % (ref 0–6.9)
ERYTHROCYTE [DISTWIDTH] IN BLOOD BY AUTOMATED COUNT: 40.2 FL (ref 35.9–50)
GLOBULIN SER CALC-MCNC: 2.9 G/DL (ref 1.9–3.5)
GLUCOSE BLD-MCNC: 95 MG/DL (ref 65–99)
GLUCOSE SERPL-MCNC: 94 MG/DL (ref 65–99)
HCT VFR BLD AUTO: 45.2 % (ref 37–47)
HGB BLD-MCNC: 15.6 G/DL (ref 12–16)
IMM GRANULOCYTES # BLD AUTO: 0.05 K/UL (ref 0–0.11)
IMM GRANULOCYTES NFR BLD AUTO: 0.4 % (ref 0–0.9)
INR PPP: 1.03 (ref 0.87–1.13)
LYMPHOCYTES # BLD AUTO: 3.93 K/UL (ref 1–4.8)
LYMPHOCYTES NFR BLD: 35 % (ref 22–41)
MCH RBC QN AUTO: 31.4 PG (ref 27–33)
MCHC RBC AUTO-ENTMCNC: 34.5 G/DL (ref 33.6–35)
MCV RBC AUTO: 90.9 FL (ref 81.4–97.8)
MONOCYTES # BLD AUTO: 0.77 K/UL (ref 0–0.85)
MONOCYTES NFR BLD AUTO: 6.9 % (ref 0–13.4)
NEUTROPHILS # BLD AUTO: 6.15 K/UL (ref 2–7.15)
NEUTROPHILS NFR BLD: 54.9 % (ref 44–72)
NRBC # BLD AUTO: 0 K/UL
NRBC BLD-RTO: 0 /100 WBC
PLATELET # BLD AUTO: 227 K/UL (ref 164–446)
PMV BLD AUTO: 10.5 FL (ref 9–12.9)
POTASSIUM SERPL-SCNC: 4.4 MMOL/L (ref 3.6–5.5)
PROT SERPL-MCNC: 6.8 G/DL (ref 6–8.2)
PROTHROMBIN TIME: 13.8 SEC (ref 12–14.6)
RBC # BLD AUTO: 4.97 M/UL (ref 4.2–5.4)
RH BLD: NORMAL
SARS-COV-2 RNA RESP QL NAA+PROBE: NOTDETECTED
SODIUM SERPL-SCNC: 136 MMOL/L (ref 135–145)
SPECIMEN SOURCE: NORMAL
TROPONIN T SERPL-MCNC: <6 NG/L (ref 6–19)
WBC # BLD AUTO: 11.2 K/UL (ref 4.8–10.8)

## 2020-10-30 PROCEDURE — U0003 INFECTIOUS AGENT DETECTION BY NUCLEIC ACID (DNA OR RNA); SEVERE ACUTE RESPIRATORY SYNDROME CORONAVIRUS 2 (SARS-COV-2) (CORONAVIRUS DISEASE [COVID-19]), AMPLIFIED PROBE TECHNIQUE, MAKING USE OF HIGH THROUGHPUT TECHNOLOGIES AS DESCRIBED BY CMS-2020-01-R: HCPCS

## 2020-10-30 PROCEDURE — 85730 THROMBOPLASTIN TIME PARTIAL: CPT

## 2020-10-30 PROCEDURE — 84484 ASSAY OF TROPONIN QUANT: CPT

## 2020-10-30 PROCEDURE — 99284 EMERGENCY DEPT VISIT MOD MDM: CPT

## 2020-10-30 PROCEDURE — 86850 RBC ANTIBODY SCREEN: CPT

## 2020-10-30 PROCEDURE — 85025 COMPLETE CBC W/AUTO DIFF WBC: CPT

## 2020-10-30 PROCEDURE — 86901 BLOOD TYPING SEROLOGIC RH(D): CPT

## 2020-10-30 PROCEDURE — 70450 CT HEAD/BRAIN W/O DYE: CPT

## 2020-10-30 PROCEDURE — 93005 ELECTROCARDIOGRAM TRACING: CPT

## 2020-10-30 PROCEDURE — 85610 PROTHROMBIN TIME: CPT

## 2020-10-30 PROCEDURE — 71045 X-RAY EXAM CHEST 1 VIEW: CPT

## 2020-10-30 PROCEDURE — 80053 COMPREHEN METABOLIC PANEL: CPT

## 2020-10-30 PROCEDURE — 86900 BLOOD TYPING SEROLOGIC ABO: CPT

## 2020-10-30 PROCEDURE — 99214 OFFICE O/P EST MOD 30 MIN: CPT | Performed by: FAMILY MEDICINE

## 2020-10-30 PROCEDURE — 82962 GLUCOSE BLOOD TEST: CPT

## 2020-10-30 PROCEDURE — 93005 ELECTROCARDIOGRAM TRACING: CPT | Performed by: EMERGENCY MEDICINE

## 2020-10-30 PROCEDURE — C9803 HOPD COVID-19 SPEC COLLECT: HCPCS | Performed by: EMERGENCY MEDICINE

## 2020-10-30 ASSESSMENT — LIFESTYLE VARIABLES
EVER FELT BAD OR GUILTY ABOUT YOUR DRINKING: NO
DOES PATIENT WANT TO STOP DRINKING: NO
CONSUMPTION TOTAL: INCOMPLETE
HAVE PEOPLE ANNOYED YOU BY CRITICIZING YOUR DRINKING: NO
DO YOU DRINK ALCOHOL: NO
TOTAL SCORE: 0
EVER HAD A DRINK FIRST THING IN THE MORNING TO STEADY YOUR NERVES TO GET RID OF A HANGOVER: NO
TOTAL SCORE: 0
HAVE YOU EVER FELT YOU SHOULD CUT DOWN ON YOUR DRINKING: NO
TOTAL SCORE: 0

## 2020-10-30 ASSESSMENT — ENCOUNTER SYMPTOMS
VOMITING: 0
NAUSEA: 0
MYALGIAS: 0
EYE DISCHARGE: 0
EYE REDNESS: 0
WEIGHT LOSS: 0

## 2020-10-30 NOTE — ED TRIAGE NOTES
"Bria Martinez  Chief Complaint   Patient presents with   • Dizziness     Pt complains of sudden dizziness and nausea, Pt states this is what it felt like when she had her previous cerebelar stroke 2-3 years ago.    • Nausea     Pt ambulatory to triage with above complaint.     /83   Pulse (!) 102   Temp 37 °C (98.6 °F) (Temporal)   Resp 16   Ht 1.6 m (5' 3\")   Wt 108.9 kg (240 lb 1.3 oz)   SpO2 95%   BMI 42.53 kg/m²       "

## 2020-10-30 NOTE — ED NOTES
Pt laying in bed. no signs of acute distress. respirations even and unlabored. call light within reach. Given warm blanket for comfort. Significant other at bedside.

## 2020-10-30 NOTE — ED PROVIDER NOTES
"ED Provider Note    Scribed for Ashish Beach M.D. by Denise Pinedo. 10/30/2020  1:01 PM    Primary care provider: Kelvin Pearson M.D.  Means of arrival: Walk-in  History obtained from: Patient  History limited by: None    CHIEF COMPLAINT  Chief Complaint   Patient presents with   • Dizziness     Pt complains of sudden dizziness and nausea, Pt states this is what it felt like when she had her previous cerebelar stroke 2-3 years ago.    • Nausea       HPI  Bria Martinez is a 44 y.o. female with a history of a cerebral stroke who presents to the Emergency Department for dizziness and nausea onset 2 pm yesterday. She states she \"doesn't feel right\" and had a 1.5 minute episode of spinning yesterday 2PM without repeat episodes. She had a cerebral stroke 3 years ago and is concerned she is having another one. She had a septal defect in her heart that led to her stroke, that has since been repaired. She further reports leg weakness, fatigue, and headache. The headache began around 3 hours ago and she did not wake up with it. It is currently 0/10 but she has been having intermittent headaches lasting 30-60 minutes today. She does not normally experience headaches. Patient denies any fever, cough, sore thorat, shortness of breath, chest pain, palpations, vomiting, diarrhea, dysuria, and abdominal pain. She has no history of heart attack, heart failure, hypertension, hyperlipidemia or diabetes. She currently takes aspirin and denies any sick contact. She has no residual symptoms besides some occasional unsteadiness.     She additionally reports intermittent urinary incontinence for the past year. She has no neck or back pain. No family history of MS. She had sepsis 4 years ago secondary to stomach infection.      REVIEW OF SYSTEMS  Pertinent positives include: spinning, leg weakness, dizziness, and nausea.  Pertinent negatives include: fever, cough, sore thorat, shortness of breath, chest pain, palpations, vomiting, " "diarrhea, dysuria, and abdominal pain.  10+ systems reviewed and negative.      PAST MEDICAL HISTORY  Past Medical History:   Diagnosis Date   • Acid reflux disease 2012   • Anesthesia 2017    uncontrollable shaking   • Breath shortness 2017    no oxygen usage   • CHEST PAIN 2012   • Dental disorder 2017    upper partial   • Gynecological disorder    • Heart burn    • Hypercholesterolemia 2012   • Psychiatric problem 2017    anxiety; and panic attacks   • Tobacco dependence quit  in setting of CVA    • Urinary bladder disorder 2017    leakage with sneezing/movement       SOCIAL HISTORY  Social History     Tobacco Use   • Smoking status: Current Every Day Smoker     Packs/day: 1.00     Years: 20.00     Pack years: 20.00     Types: Cigarettes     Last attempt to quit: 2018     Years since quittin.4   • Smokeless tobacco: Never Used   Substance Use Topics   • Alcohol use: Yes     Comment: RARELY   • Drug use: No     Social History     Substance and Sexual Activity   Drug Use No       SURGICAL HISTORY  Past Surgical History:   Procedure Laterality Date   • VAGINAL HYSTERECTOMY SCOPE TOTAL  2017    Procedure: VAGINAL HYSTERECTOMY SCOPE TOTAL ;  Surgeon: Davin Liriano M.D.;  Location: SURGERY [facility];  Service:    • SALPINGECTOMY Bilateral 2017    Procedure: SALPINGECTOMY;  Surgeon: Davin Liriano M.D.;  Location: SURGERY [facility];  Service:    • CHOLECYSTECTOMY     • APPENDECTOMY     • BREAST BIOPSY Left    • TONSILLECTOMY         CURRENT MEDICATIONS  Current Outpatient Medications:   •  ASPIRIN 81 PO, Take  by mouth., Disp: , Rfl:   •  ibuprofen (MOTRIN) 200 MG Tab, Take 400-600 mg by mouth every 6 hours as needed for Mild Pain., Disp: , Rfl:      ALLERGIES  Allergies   Allergen Reactions   • Bee Anaphylaxis   • Augmentin Rash     Pt states \"I get a bad rash\".     • Latex Rash     Pt states \"I get a bad rash\".   • Neosporin Lip Health Hives     " "Blisters and swelling at site   • Other Misc Rash     Nail acrylic- Pt states \"I get a bad rash\".   • Sulfa Drugs Hives, Itching and Swelling   • Tape Rash     Pt states \"I get a bad rash\".  Paper tape ok       PHYSICAL EXAM  VITAL SIGNS: /83   Pulse (!) 102   Temp 37 °C (98.6 °F) (Temporal)   Resp 16   Ht 1.6 m (5' 3\")   Wt 108.9 kg (240 lb 1.3 oz)   SpO2 95%   BMI 42.53 kg/m²   Reviewed and tachycardic, high normal blood pressure  Constitutional: Well developed, Well nourished, moderately overweight, anxious.  HENT: Normocephalic, atraumatic, bilateral external ears normal, wearing a mask.   Eyes: PERRLA, conjunctiva pink, no scleral icterus.   Cardiovascular: Regular rate and rhythm. No murmurs, rubs or gallops.  No dependent edema or calf tenderness  Respiratory: Lungs clear to auscultation bilaterally. No wheezes, rales, or rhonchi.  Abdominal:  Abdomen soft, non-tender, non distended. No rebound, or guarding.    Skin: No erythema, no rash.   Genitourinary: No costovertebral angle tenderness.   Musculoskeletal: no deformities.   Neurologic: LOC: Normal.      Motor left leg: drift both legs.  LOC questions; answers correctly.     Motor right leg: No drift.  Commands: Follows.     Limb ataxia: None.  Best gaze: Normal.      Sensation: Intact to light touch 4 limbs.  Visual fields: Intact by quadrants.     Language: Fluent.  Facial palsy: None.     Dysarthria: Normal.  Motor left arm: No drift.     Extinction: Normal.  Motor right arm: No drift.     Score: 2   Psychiatric: Affect normal, Judgment normal, Mood normal.     DIFFERENTIAL DIAGNOSIS:  Ataxia vs vertigo vs infarct vs viral syndrome vs dehydration, doubt stroke.    EKG Interpretation:  Interpreted by me    Rhythm:  Normal sinus rhythm   Rate: 85  Axis: normal  Ectopy: none  Conduction: normal  ST Segments: no acute change  T Waves: no acute change  Q Waves: none  Clinical Impression: Normal EKG without acute changes "     RADIOLOGY/PROCEDURES  DX-CHEST-PORTABLE (1 VIEW)   Final Result      No radiographic evidence of acute cardiopulmonary process.      CT-HEAD W/O   Final Result      No acute intracranial abnormality.      Chronic small left PICA infarct.        Radiologist interpretation have been reviewed by me.     LABORATORY:  Results for orders placed or performed during the hospital encounter of 10/30/20   CBC WITH DIFFERENTIAL   Result Value Ref Range    WBC 11.2 (H) 4.8 - 10.8 K/uL    RBC 4.97 4.20 - 5.40 M/uL    Hemoglobin 15.6 12.0 - 16.0 g/dL    Hematocrit 45.2 37.0 - 47.0 %    MCV 90.9 81.4 - 97.8 fL    MCH 31.4 27.0 - 33.0 pg    MCHC 34.5 33.6 - 35.0 g/dL    RDW 40.2 35.9 - 50.0 fL    Platelet Count 227 164 - 446 K/uL    MPV 10.5 9.0 - 12.9 fL    Neutrophils-Polys 54.90 44.00 - 72.00 %    Lymphocytes 35.00 22.00 - 41.00 %    Monocytes 6.90 0.00 - 13.40 %    Eosinophils 2.30 0.00 - 6.90 %    Basophils 0.50 0.00 - 1.80 %    Immature Granulocytes 0.40 0.00 - 0.90 %    Nucleated RBC 0.00 /100 WBC    Neutrophils (Absolute) 6.15 2.00 - 7.15 K/uL    Lymphs (Absolute) 3.93 1.00 - 4.80 K/uL    Monos (Absolute) 0.77 0.00 - 0.85 K/uL    Eos (Absolute) 0.26 0.00 - 0.51 K/uL    Baso (Absolute) 0.06 0.00 - 0.12 K/uL    Immature Granulocytes (abs) 0.05 0.00 - 0.11 K/uL    NRBC (Absolute) 0.00 K/uL   COMP METABOLIC PANEL   Result Value Ref Range    Sodium 136 135 - 145 mmol/L    Potassium 4.4 3.6 - 5.5 mmol/L    Chloride 103 96 - 112 mmol/L    Co2 23 20 - 33 mmol/L    Anion Gap 10.0 7.0 - 16.0    Glucose 94 65 - 99 mg/dL    Bun 14 8 - 22 mg/dL    Creatinine 0.61 0.50 - 1.40 mg/dL    Calcium 8.6 8.5 - 10.5 mg/dL    AST(SGOT) 20 12 - 45 U/L    ALT(SGPT) 33 2 - 50 U/L    Alkaline Phosphatase 67 30 - 99 U/L    Total Bilirubin 0.4 0.1 - 1.5 mg/dL    Albumin 3.9 3.2 - 4.9 g/dL    Total Protein 6.8 6.0 - 8.2 g/dL    Globulin 2.9 1.9 - 3.5 g/dL    A-G Ratio 1.3 g/dL   PROTHROMBIN TIME   Result Value Ref Range    PT 13.8 12.0 - 14.6 sec     INR 1.03 0.87 - 1.13   APTT   Result Value Ref Range    APTT 27.2 24.7 - 36.0 sec   COD (ADULT)   Result Value Ref Range    ABO Grouping Only A     Rh Grouping Only POS     Antibody Screen-Cod NEG    TROPONIN   Result Value Ref Range    Troponin T <6 6 - 19 ng/L   ACCU-CHEK GLUCOSE   Result Value Ref Range    Glucose - Accu-Ck 95 65 - 99 mg/dL      Lab results reviewed by me.     ED COURSE:  Nursing notes, VS, PMSFHx reviewed in chart.     1:01 PM - Patient seen and examined at bedside. Ordered Dx-chest, Ct-head, accu-chek glucose, CBC with diff, CMP, prothrombin, APTT, COD, troponin, and ABO Rh to evaluate.     2:44 PM - Patient was reevaluated at bedside. Patient performed tip-toe walk and heel walk with good strength. Patient will be discharged. Patient verbalizes understanding and agreement to this plan of care.      Patient was able to walk including tiptoe and heel gait without signs of weakness    MEDICAL DECISION MAKING:  Well-appearing patient presents with an episode of dizziness followed by nausea malaise and intermittent headaches.  She has a history of prior cerebellar stroke due to septal defect or patent foramen ovale which was treated.  She has occasional dizziness since her stroke.  She does not think her current symptoms are related to her prior stroke.  She may have a viral syndrome.  There is no evidence of acute stroke, arrhythmia, sepsis, hemorrhagic stroke or mass.    PLAN:  Tylenol for pain  Rest and fluids  Covid testing  Home isolate until Covid results known  Return for ill appearance, severe dizziness, uncontrolled vomiting, neurologic symptoms    Kelvin Pearson M.D.  [address]  [address]  [facility] [address]  [phone]    Schedule an appointment as soon as possible for a visit   As needed if not betteer 5-6 days      CONDITION: good.     FINAL IMPRESSION  1. Dizziness    2. Nausea          I, Denise Pinedo (Scribe), am scribing for, and in the presence of, Ashish Beach,  M.D..    Electronically signed by: Denise Pinedo (Scribe), 10/30/2020    I, Ashish Beach M.D. personally performed the services described in this documentation, as scribed by Denise Pinedo in my presence, and it is both accurate and complete. C    The note accurately reflects work and decisions made by me.  Ashish Beach M.D.  10/30/2020  4:48 PM

## 2020-10-30 NOTE — PROGRESS NOTES
"Subjective:      Bria Martinez is a 44 y.o. female who presents with Dizziness (feels weak, nausea, fatigue, allergie symptoms, states she had same symptoms when she had her stroke, x2 days )            Onset 1400 yesterday vertigo with associated nausea. Not positional.  Similar to symptoms of previous cerebellar stroke but less severe.  Symptoms have now resolved.  No visual field cuts.  No ataxia.  No headache.  No unilateral weakness.  No other aggravating or alleviating factors.  Previous stroke was associated with PFO that has been repaired.      Review of Systems   Constitutional: Negative for malaise/fatigue and weight loss.   Eyes: Negative for discharge and redness.   Gastrointestinal: Negative for nausea and vomiting.   Musculoskeletal: Negative for joint pain and myalgias.   Skin: Negative for itching and rash.     .  Medications, Allergies, and current problem list reviewed today in Epic       Objective:     BP (!) 98/62 (BP Location: Left arm, Patient Position: Sitting, BP Cuff Size: Adult)   Pulse 93   Temp 36.2 °C (97.1 °F) (Temporal)   Resp 16   Ht 1.613 m (5' 3.5\")   Wt 108.9 kg (240 lb)   SpO2 96%   BMI 41.85 kg/m²      Physical Exam  Constitutional:       General: She is not in acute distress.     Appearance: She is well-developed.   HENT:      Head: Normocephalic and atraumatic.   Eyes:      Conjunctiva/sclera: Conjunctivae normal.   Cardiovascular:      Rate and Rhythm: Normal rate and regular rhythm.      Heart sounds: Normal heart sounds. No murmur.   Pulmonary:      Effort: Pulmonary effort is normal.      Breath sounds: Normal breath sounds. No wheezing.   Skin:     General: Skin is warm and dry.      Findings: No rash.   Neurological:      General: No focal deficit present.      Mental Status: She is alert and oriented to person, place, and time.      Cranial Nerves: No cranial nerve deficit.      Sensory: No sensory deficit.      Motor: No weakness.      Coordination: " Coordination normal.      Gait: Gait normal.                 Assessment/Plan:         1. Vertigo     2. History of cerebellar stroke       Differential diagnosis, natural history, supportive care, and indications for immediate follow-up discussed at length.     Recommend to ED for further evaluation and treatment.  Offered EMS transport and patient declined.  She will have her boyfriend drive her.

## 2020-10-30 NOTE — DISCHARGE INSTRUCTIONS
We could not find a dangerous cause of your symptoms.  You may have a viral illness.  Home isolate until Covid results are known.  Rest, drink plenty of fluids and take Tylenol as needed.  Return for severe dizziness, loss of consciousness, uncontrolled vomiting, neurologic symptoms or other concerning symptoms.

## 2020-10-30 NOTE — ED NOTES
Pt discharged from ED. Verbalized understanding of instructions. No questions or concerns at this time. Pt walked out of department with steady gait. Respirations even and unlabored.

## 2021-10-22 NOTE — OR SURGEON
Operative Report    PreOp Diagnosis: abnormal uterine bleeding    PostOp Diagnosis: same    Procedure(s):  VAGINAL HYSTERECTOMY SCOPE TOTAL  - Wound Class: Clean Contaminated  SALPINGECTOMY - Wound Class: Clean Contaminated    Surgeon(s):  MAYE Miramontes M.D.    Anesthesiologist/Type of Anesthesia:  Anesthesiologist: Froilan Dixon M.D./General    Surgical Staff:  Circulator: Jessica Alanis R.N.  Scrub Person: Evette Anthony [place]: Olvin Patel R.N.    Specimens:  * No specimens in log *    Estimated Blood Loss: 150cc    Findings: normal antatomy    Complications: none noted        8/23/2017 9:21 AM Davin Liriano    
never

## 2022-08-16 ENCOUNTER — OFFICE VISIT (OUTPATIENT)
Dept: URGENT CARE | Facility: PHYSICIAN GROUP | Age: 47
End: 2022-08-16
Payer: COMMERCIAL

## 2022-08-16 VITALS
TEMPERATURE: 98 F | WEIGHT: 233 LBS | HEART RATE: 92 BPM | HEIGHT: 62 IN | OXYGEN SATURATION: 96 % | RESPIRATION RATE: 16 BRPM | BODY MASS INDEX: 42.88 KG/M2 | DIASTOLIC BLOOD PRESSURE: 86 MMHG | SYSTOLIC BLOOD PRESSURE: 122 MMHG

## 2022-08-16 DIAGNOSIS — T63.441A LOCAL REACTION TO BEE STING, ACCIDENTAL OR UNINTENTIONAL, INITIAL ENCOUNTER: ICD-10-CM

## 2022-08-16 PROCEDURE — 99213 OFFICE O/P EST LOW 20 MIN: CPT | Performed by: EMERGENCY MEDICINE

## 2022-08-16 RX ORDER — DEXAMETHASONE SODIUM PHOSPHATE 10 MG/ML
10 INJECTION INTRAMUSCULAR; INTRAVENOUS ONCE
Status: COMPLETED | OUTPATIENT
Start: 2022-08-16 | End: 2022-08-16

## 2022-08-16 RX ORDER — EPINEPHRINE 0.3 MG/.3ML
0.3 INJECTION SUBCUTANEOUS ONCE
Status: DISCONTINUED | OUTPATIENT
Start: 2022-08-16 | End: 2022-08-16

## 2022-08-16 RX ORDER — EPINEPHRINE 0.3 MG/.3ML
0.3 INJECTION SUBCUTANEOUS ONCE
Qty: 1 EACH | Refills: 0 | Status: SHIPPED | OUTPATIENT
Start: 2022-08-16 | End: 2022-08-16

## 2022-08-16 RX ORDER — PREDNISONE 20 MG/1
40 TABLET ORAL DAILY
Qty: 6 TABLET | Refills: 0 | Status: SHIPPED | OUTPATIENT
Start: 2022-08-16 | End: 2022-08-19

## 2022-08-16 RX ADMIN — DEXAMETHASONE SODIUM PHOSPHATE 10 MG: 10 INJECTION INTRAMUSCULAR; INTRAVENOUS at 16:39

## 2022-08-16 ASSESSMENT — ENCOUNTER SYMPTOMS
VOMITING: 0
NAUSEA: 0
SHORTNESS OF BREATH: 0
FEVER: 0

## 2022-08-16 ASSESSMENT — FIBROSIS 4 INDEX: FIB4 SCORE: 0.72

## 2022-08-16 NOTE — PATIENT INSTRUCTIONS
May take zantac or pepcid over the counter strength twice daily x 3 days in addition to benadryl.    Most important - keep hand elevated above heart to limit swelling!

## 2022-08-16 NOTE — PROGRESS NOTES
"  Subjective:     Bria Martinez  is a 46 y.o. female who presents for Insect Bite (Happened last night, left arm swelling, redness, painful, itchy )       Patient is a 46-year-old female who reports a bee or wasp sting to her left middle finger yesterday evening.  She states she saw bite on her hand.  Her finger blew up almost immediately, and she noted the swelling ballooned up over the dorsum of her hand about alf up the back of her hand.  Overnight the swelling has disseminated somewhat.  She never had any systemic symptoms this time, never developed systemic hives, shortness of breath, swelling as she has for some episodes in the past.  She states her EpiPen is .  She did take some Benadryl last night.  She states through the day today her hand has gotten increasingly swollen and spite of cold compresses and vinegar soaks.   Review of Systems   Constitutional:  Negative for fever.   Respiratory:  Negative for shortness of breath.    Gastrointestinal:  Negative for nausea and vomiting.   Skin:  Negative for rash.        Swelling at bite site left mid finger   All other systems reviewed and are negative.   Allergies   Allergen Reactions    Bee Anaphylaxis    Augmentin Rash     Pt states \"I get a bad rash\".      Latex Rash     Pt states \"I get a bad rash\".    Neosporin Lip Health Hives     Blisters and swelling at site    Other Misc Rash     Nail acrylic- Pt states \"I get a bad rash\".    Sulfa Drugs Hives, Itching and Swelling    Tape Rash     Pt states \"I get a bad rash\".  Paper tape ok     Past Medical History:   Diagnosis Date    Acid reflux disease 2012    Anesthesia 2017    uncontrollable shaking    Breath shortness 2017    no oxygen usage    CHEST PAIN 2012    Dental disorder 2017    upper partial    Gynecological disorder     Heart burn     Hypercholesterolemia 2012    Psychiatric problem 2017    anxiety; and panic attacks    Tobacco dependence quit 2018 in " "setting of CVA     Urinary bladder disorder 8-9-2017    leakage with sneezing/movement        Objective:   /86 (BP Location: Right arm, Patient Position: Sitting, BP Cuff Size: Adult)   Pulse 92   Temp 36.7 °C (98 °F) (Temporal)   Resp 16   Ht 1.575 m (5' 2\")   Wt 106 kg (233 lb)   SpO2 96%   BMI 42.62 kg/m²   Physical Exam  Vitals and nursing note reviewed.   Constitutional:       Appearance: Normal appearance. She is not ill-appearing, toxic-appearing or diaphoretic.   HENT:      Head: Normocephalic and atraumatic.      Nose: No rhinorrhea.      Mouth/Throat:      Comments: No perioral, lip, tongue swelling  Eyes:      General: No scleral icterus.  Cardiovascular:      Rate and Rhythm: Normal rate and regular rhythm.      Heart sounds: Normal heart sounds. No murmur heard.  Pulmonary:      Effort: Pulmonary effort is normal. No respiratory distress.      Breath sounds: Normal breath sounds. No wheezing or rhonchi.   Musculoskeletal:         General: Swelling present. Normal range of motion.   Skin:     General: Skin is warm.      Findings: No rash.      Comments: No hives, urticaria diffusely.  Localized swelling to the left hand mostly fingers to the mid palm and up to the wrist, mildly puffy more proximally.  Able to flex and extend fingers.  Patient removed her ring from her left ring finger due to the swelling her to arrival.   Neurological:      Mental Status: She is alert and oriented to person, place, and time.   Psychiatric:         Mood and Affect: Mood normal.         Behavior: Behavior normal.         Thought Content: Thought content normal.         Assessment/Plan:     Encounter Diagnoses   Name Primary?    Local reaction to bee sting, accidental or unintentional, initial encounter        Patient with localized reaction to bee sting and swelling in the hand, but no evidence of anaphylactic reaction or generalized reaction as she has had in the past.  Treat her with steroid, " over-the-counter H2 blockers, and also refill her EpiPen should she have a anaphylactic reaction the next time as she has in the past.  Also elevate the hand to limit swelling.    Assessment    1. Local reaction to bee sting, accidental or unintentional, initial encounter  - predniSONE (DELTASONE) 20 MG Tab; Take 2 Tablets by mouth every day for 3 days.  Dispense: 6 Tablet; Refill: 0  - dexamethasone (DECADRON) injection (check route below) 10 mg  - EPINEPHrine (EPIPEN) 0.3 MG/0.3ML Solution Auto-injector solution for injection; Inject 0.3 mL into the shoulder, thigh, or buttocks one time for 1 dose. For anaphylaxis / systemic reaction  Dispense: 1 Each; Refill: 0    See AVS Instructions below for written guidance provided to patient on after-visit management and care in addition to our verbal discussion during the visit.    Please note that this dictation was created using voice recognition software. I have attempted to correct all errors, but there may be sound-alike, spelling, grammar and possibly content errors that I did not discover before finalizing the note.

## 2022-10-14 ENCOUNTER — HOSPITAL ENCOUNTER (EMERGENCY)
Facility: MEDICAL CENTER | Age: 47
End: 2022-10-14
Attending: EMERGENCY MEDICINE
Payer: COMMERCIAL

## 2022-10-14 ENCOUNTER — APPOINTMENT (OUTPATIENT)
Dept: RADIOLOGY | Facility: MEDICAL CENTER | Age: 47
End: 2022-10-14
Attending: EMERGENCY MEDICINE
Payer: COMMERCIAL

## 2022-10-14 VITALS
WEIGHT: 234.57 LBS | SYSTOLIC BLOOD PRESSURE: 118 MMHG | BODY MASS INDEX: 41.56 KG/M2 | HEART RATE: 79 BPM | RESPIRATION RATE: 18 BRPM | HEIGHT: 63 IN | DIASTOLIC BLOOD PRESSURE: 78 MMHG | OXYGEN SATURATION: 92 % | TEMPERATURE: 98.2 F

## 2022-10-14 DIAGNOSIS — R11.0 NAUSEA: ICD-10-CM

## 2022-10-14 DIAGNOSIS — R53.1 WEAKNESS: ICD-10-CM

## 2022-10-14 DIAGNOSIS — G43.109 MIGRAINE WITH AURA AND WITHOUT STATUS MIGRAINOSUS, NOT INTRACTABLE: ICD-10-CM

## 2022-10-14 LAB
ABO GROUP BLD: NORMAL
ALBUMIN SERPL BCP-MCNC: 4.5 G/DL (ref 3.2–4.9)
ALBUMIN/GLOB SERPL: 1.8 G/DL
ALP SERPL-CCNC: 74 U/L (ref 30–99)
ALT SERPL-CCNC: 39 U/L (ref 2–50)
ANION GAP SERPL CALC-SCNC: 11 MMOL/L (ref 7–16)
APTT PPP: 25.3 SEC (ref 24.7–36)
AST SERPL-CCNC: 24 U/L (ref 12–45)
BASOPHILS # BLD AUTO: 0 % (ref 0–1.8)
BASOPHILS # BLD: 0 K/UL (ref 0–0.12)
BILIRUB SERPL-MCNC: 0.3 MG/DL (ref 0.1–1.5)
BLD GP AB SCN SERPL QL: NORMAL
BUN SERPL-MCNC: 15 MG/DL (ref 8–22)
CALCIUM SERPL-MCNC: 9.2 MG/DL (ref 8.5–10.5)
CHLORIDE SERPL-SCNC: 103 MMOL/L (ref 96–112)
CO2 SERPL-SCNC: 25 MMOL/L (ref 20–33)
CREAT SERPL-MCNC: 0.77 MG/DL (ref 0.5–1.4)
EKG IMPRESSION: NORMAL
EOSINOPHIL # BLD AUTO: 0.18 K/UL (ref 0–0.51)
EOSINOPHIL NFR BLD: 1.8 % (ref 0–6.9)
ERYTHROCYTE [DISTWIDTH] IN BLOOD BY AUTOMATED COUNT: 41.1 FL (ref 35.9–50)
GFR SERPLBLD CREATININE-BSD FMLA CKD-EPI: 95 ML/MIN/1.73 M 2
GLOBULIN SER CALC-MCNC: 2.5 G/DL (ref 1.9–3.5)
GLUCOSE SERPL-MCNC: 91 MG/DL (ref 65–99)
HCT VFR BLD AUTO: 46.6 % (ref 37–47)
HGB BLD-MCNC: 16.1 G/DL (ref 12–16)
INR PPP: 0.94 (ref 0.87–1.13)
LYMPHOCYTES # BLD AUTO: 4.55 K/UL (ref 1–4.8)
LYMPHOCYTES NFR BLD: 46 % (ref 22–41)
MANUAL DIFF BLD: NORMAL
MCH RBC QN AUTO: 31.4 PG (ref 27–33)
MCHC RBC AUTO-ENTMCNC: 34.5 G/DL (ref 33.6–35)
MCV RBC AUTO: 90.8 FL (ref 81.4–97.8)
MONOCYTES # BLD AUTO: 0.52 K/UL (ref 0–0.85)
MONOCYTES NFR BLD AUTO: 5.3 % (ref 0–13.4)
MORPHOLOGY BLD-IMP: NORMAL
NEUTROPHILS # BLD AUTO: 4.64 K/UL (ref 2–7.15)
NEUTROPHILS NFR BLD: 46.9 % (ref 44–72)
NRBC # BLD AUTO: 0 K/UL
NRBC BLD-RTO: 0 /100 WBC
PLATELET # BLD AUTO: 272 K/UL (ref 164–446)
PLATELET BLD QL SMEAR: NORMAL
PMV BLD AUTO: 10.2 FL (ref 9–12.9)
POTASSIUM SERPL-SCNC: 3.9 MMOL/L (ref 3.6–5.5)
PROT SERPL-MCNC: 7 G/DL (ref 6–8.2)
PROTHROMBIN TIME: 12.5 SEC (ref 12–14.6)
RBC # BLD AUTO: 5.13 M/UL (ref 4.2–5.4)
RBC BLD AUTO: PRESENT
RH BLD: NORMAL
SODIUM SERPL-SCNC: 139 MMOL/L (ref 135–145)
TROPONIN T SERPL-MCNC: <6 NG/L (ref 6–19)
VARIANT LYMPHS BLD QL SMEAR: NORMAL
WBC # BLD AUTO: 9.9 K/UL (ref 4.8–10.8)

## 2022-10-14 PROCEDURE — 93005 ELECTROCARDIOGRAM TRACING: CPT

## 2022-10-14 PROCEDURE — 36415 COLL VENOUS BLD VENIPUNCTURE: CPT

## 2022-10-14 PROCEDURE — 86901 BLOOD TYPING SEROLOGIC RH(D): CPT

## 2022-10-14 PROCEDURE — 70450 CT HEAD/BRAIN W/O DYE: CPT

## 2022-10-14 PROCEDURE — 86900 BLOOD TYPING SEROLOGIC ABO: CPT

## 2022-10-14 PROCEDURE — 86850 RBC ANTIBODY SCREEN: CPT

## 2022-10-14 PROCEDURE — 71045 X-RAY EXAM CHEST 1 VIEW: CPT

## 2022-10-14 PROCEDURE — 99284 EMERGENCY DEPT VISIT MOD MDM: CPT

## 2022-10-14 PROCEDURE — 85025 COMPLETE CBC W/AUTO DIFF WBC: CPT

## 2022-10-14 PROCEDURE — 85730 THROMBOPLASTIN TIME PARTIAL: CPT

## 2022-10-14 PROCEDURE — 700117 HCHG RX CONTRAST REV CODE 255: Performed by: EMERGENCY MEDICINE

## 2022-10-14 PROCEDURE — 85610 PROTHROMBIN TIME: CPT

## 2022-10-14 PROCEDURE — 85007 BL SMEAR W/DIFF WBC COUNT: CPT

## 2022-10-14 PROCEDURE — 84484 ASSAY OF TROPONIN QUANT: CPT

## 2022-10-14 PROCEDURE — 94760 N-INVAS EAR/PLS OXIMETRY 1: CPT

## 2022-10-14 PROCEDURE — 0042T CT-CEREBRAL PERFUSION ANALYSIS: CPT

## 2022-10-14 PROCEDURE — 80053 COMPREHEN METABOLIC PANEL: CPT

## 2022-10-14 PROCEDURE — 96375 TX/PRO/DX INJ NEW DRUG ADDON: CPT

## 2022-10-14 PROCEDURE — 93005 ELECTROCARDIOGRAM TRACING: CPT | Performed by: EMERGENCY MEDICINE

## 2022-10-14 PROCEDURE — 70496 CT ANGIOGRAPHY HEAD: CPT

## 2022-10-14 PROCEDURE — 96374 THER/PROPH/DIAG INJ IV PUSH: CPT

## 2022-10-14 PROCEDURE — 70498 CT ANGIOGRAPHY NECK: CPT

## 2022-10-14 PROCEDURE — 700111 HCHG RX REV CODE 636 W/ 250 OVERRIDE (IP): Performed by: EMERGENCY MEDICINE

## 2022-10-14 RX ORDER — DIPHENHYDRAMINE HYDROCHLORIDE 50 MG/ML
25 INJECTION INTRAMUSCULAR; INTRAVENOUS ONCE
Status: COMPLETED | OUTPATIENT
Start: 2022-10-14 | End: 2022-10-14

## 2022-10-14 RX ORDER — ONDANSETRON 4 MG/1
4 TABLET, ORALLY DISINTEGRATING ORAL EVERY 8 HOURS PRN
Qty: 20 TABLET | Refills: 0 | Status: SHIPPED | OUTPATIENT
Start: 2022-10-14 | End: 2024-01-23

## 2022-10-14 RX ORDER — METOCLOPRAMIDE HYDROCHLORIDE 5 MG/ML
5 INJECTION INTRAMUSCULAR; INTRAVENOUS ONCE
Status: COMPLETED | OUTPATIENT
Start: 2022-10-14 | End: 2022-10-14

## 2022-10-14 RX ADMIN — IOHEXOL 40 ML: 350 INJECTION, SOLUTION INTRAVENOUS at 14:24

## 2022-10-14 RX ADMIN — METOCLOPRAMIDE 5 MG: 5 INJECTION, SOLUTION INTRAMUSCULAR; INTRAVENOUS at 15:53

## 2022-10-14 RX ADMIN — DIPHENHYDRAMINE HYDROCHLORIDE 25 MG: 50 INJECTION, SOLUTION INTRAMUSCULAR; INTRAVENOUS at 15:53

## 2022-10-14 RX ADMIN — IOHEXOL 80 ML: 350 INJECTION, SOLUTION INTRAVENOUS at 14:32

## 2022-10-14 ASSESSMENT — FIBROSIS 4 INDEX: FIB4 SCORE: 0.72

## 2022-10-14 NOTE — PROGRESS NOTES
Pt activated as a code stroke. LKW 11, R sided deficits and slurred speech noted by ERP. Pic placed, plans drawn. Pt transported to CT.

## 2022-10-14 NOTE — ED NOTES
PT was [id number]. Apologized for wait times and thanked them for their patience. Advised them to please let us know if anything changes in their condition.

## 2022-10-14 NOTE — ED TRIAGE NOTES
"Amb to triage w/ c/o an onset of dizziness, R sided headache, nausea, \"a warm feeling going through my body\", \"feeling like by eyes were crossing\", gen weakness\". Symptoms began at 1100 and lasted approx 3 min.  Patient reports similar episodes x 2 throughout this year.  First time patient is being evaluated.   "

## 2022-10-14 NOTE — ED PROVIDER NOTES
"ED Provider Note    Scribed for Adam Rosado M.D. by Lesa Rodas. 10/14/2022  1:47 PM    Primary care provider: Kelvin Pearson M.D.  Means of arrival: walk in  History obtained from: patient  History limited by: none    CHIEF COMPLAINT  Chief Complaint   Patient presents with    Dizziness    Headache    Nausea       HPI  Bria Martinez is a 46 y.o. female who presents to the Emergency Department for vision problems onset 11 AM. Patient states she was driving and waiting at a light when she states her vision started disappearing. She described it as being \"cross eyed and dizzy.\" She denies any spinning or sensation of movement with the episode. Patient states her vision restored after 2 minutes and then she developed body warmth, a headache and nausea but denies any numbness or tingling in her extremities. Upon exam she does report some weakness on her left side. Patient states the headache was initially generalized but has moved to the right side of her head, rating it a 5/10. Patient states she has had episodes like this happen 3 times before with similar symptoms. She denies any exacerbation of symptoms with light and noises. She has a history of a CVA in 2018 due to a hole in hear heart. Patient stated during this stroke she had total left sided weakness.    REVIEW OF SYSTEMS  Pertinent positives include body warmth, a headache and nausea, left sided weakness. Pertinent negatives include no numbness or tingling.  All other systems reviewed and negative.    PAST MEDICAL HISTORY   has a past medical history of Acid reflux disease (08/08/2012), Anesthesia (08/09/2017), Breath shortness (08/09/2017), CHEST PAIN (08/08/2012), Dental disorder (08/09/2017), Gynecological disorder, Heart burn, Hypercholesterolemia (08/08/2012), Migraines, Psychiatric problem (08/09/2017), Stroke (HCC), Tobacco dependence quit 2018 in setting of CVA, and Urinary bladder disorder (08/09/2017).    SURGICAL HISTORY   has a " "past surgical history that includes tonsillectomy; cholecystectomy (2009); appendectomy; breast biopsy (Left); vaginal hysterectomy scope total (2017); and salpingectomy (Bilateral, 2017).    SOCIAL HISTORY  Social History     Tobacco Use    Smoking status: Every Day     Packs/day: 1.00     Years: 20.00     Pack years: 20.00     Types: Cigarettes     Last attempt to quit: 2018     Years since quittin.3    Smokeless tobacco: Never   Vaping Use    Vaping Use: Never used   Substance Use Topics    Alcohol use: Yes     Comment: RARELY    Drug use: No      Social History     Substance and Sexual Activity   Drug Use No       FAMILY HISTORY  History reviewed. No pertinent family history.    CURRENT MEDICATIONS  Current Outpatient Medications   Medication Instructions    ASPIRIN 81 PO Oral      ALLERGIES  Allergies   Allergen Reactions    Bee Anaphylaxis    Augmentin Rash     Pt states \"I get a bad rash\".      Latex Rash     Pt states \"I get a bad rash\".    Neosporin Lip Health Hives     Blisters and swelling at site    Other Misc Rash     Nail acrylic- Pt states \"I get a bad rash\".    Sulfa Drugs Hives, Itching and Swelling    Tape Rash     Pt states \"I get a bad rash\".  Paper tape ok       PHYSICAL EXAM  VITAL SIGNS: /69   Pulse 81   Temp 36.7 °C (98.1 °F) (Temporal)   Resp 18   Ht 1.6 m (5' 3\")   Wt 106 kg (234 lb 9.1 oz)   SpO2 95%   BMI 41.55 kg/m²     Constitutional: Well developed, Well nourished, mild to moderate distress, Non-toxic appearance.   HENT: Normocephalic, Atraumatic, Bilateral external ears normal, Oropharynx moist, No oral exudates. Tenderness to right temporal area.   Eyes: PERRLA, EOMI, Conjunctiva normal, No discharge.   Neck: No tenderness, Supple, No stridor.   Lymphatic: No lymphadenopathy noted.   Cardiovascular: Normal heart rate, Normal rhythm.   Thorax & Lungs: Clear to auscultation bilaterally, No respiratory distress, No wheezing, No crackles.   Abdomen: Soft, " No tenderness, No masses, No pulsatile masses.   Skin: Warm, Dry, No erythema, No rash.   Extremities:, No edema No cyanosis. Slight weakness to right arm and right leg.  Musculoskeletal: No tenderness to palpation or major deformities noted.  Intact distal pulses  Neurologic: Awake, alert. Moves all extremities spontaneously. Slight dysarthria, no facial droop. NIH: 3  Psychiatric: Affect normal, Judgment normal, Mood normal.     LABS  Results for orders placed or performed during the hospital encounter of 10/14/22   CBC WITH DIFFERENTIAL   Result Value Ref Range    WBC 9.9 4.8 - 10.8 K/uL    RBC 5.13 4.20 - 5.40 M/uL    Hemoglobin 16.1 (H) 12.0 - 16.0 g/dL    Hematocrit 46.6 37.0 - 47.0 %    MCV 90.8 81.4 - 97.8 fL    MCH 31.4 27.0 - 33.0 pg    MCHC 34.5 33.6 - 35.0 g/dL    RDW 41.1 35.9 - 50.0 fL    Platelet Count 272 164 - 446 K/uL    MPV 10.2 9.0 - 12.9 fL    Neutrophils-Polys 46.90 44.00 - 72.00 %    Lymphocytes 46.00 (H) 22.00 - 41.00 %    Monocytes 5.30 0.00 - 13.40 %    Eosinophils 1.80 0.00 - 6.90 %    Basophils 0.00 0.00 - 1.80 %    Nucleated RBC 0.00 /100 WBC    Neutrophils (Absolute) 4.64 2.00 - 7.15 K/uL    Lymphs (Absolute) 4.55 1.00 - 4.80 K/uL    Monos (Absolute) 0.52 0.00 - 0.85 K/uL    Eos (Absolute) 0.18 0.00 - 0.51 K/uL    Baso (Absolute) 0.00 0.00 - 0.12 K/uL    NRBC (Absolute) 0.00 K/uL   COMP METABOLIC PANEL   Result Value Ref Range    Sodium 139 135 - 145 mmol/L    Potassium 3.9 3.6 - 5.5 mmol/L    Chloride 103 96 - 112 mmol/L    Co2 25 20 - 33 mmol/L    Anion Gap 11.0 7.0 - 16.0    Glucose 91 65 - 99 mg/dL    Bun 15 8 - 22 mg/dL    Creatinine 0.77 0.50 - 1.40 mg/dL    Calcium 9.2 8.5 - 10.5 mg/dL    AST(SGOT) 24 12 - 45 U/L    ALT(SGPT) 39 2 - 50 U/L    Alkaline Phosphatase 74 30 - 99 U/L    Total Bilirubin 0.3 0.1 - 1.5 mg/dL    Albumin 4.5 3.2 - 4.9 g/dL    Total Protein 7.0 6.0 - 8.2 g/dL    Globulin 2.5 1.9 - 3.5 g/dL    A-G Ratio 1.8 g/dL   PROTHROMBIN TIME   Result Value Ref Range     PT 12.5 12.0 - 14.6 sec    INR 0.94 0.87 - 1.13   APTT   Result Value Ref Range    APTT 25.3 24.7 - 36.0 sec   COD (ADULT)   Result Value Ref Range    ABO Grouping Only A     Rh Grouping Only POS     Antibody Screen-Cod NEG    TROPONIN   Result Value Ref Range    Troponin T <6 6 - 19 ng/L   ESTIMATED GFR   Result Value Ref Range    GFR (CKD-EPI) 95 >60 mL/min/1.73 m 2   DIFFERENTIAL MANUAL   Result Value Ref Range    Manual Diff Status PERFORMED    PERIPHERAL SMEAR REVIEW   Result Value Ref Range    Peripheral Smear Review see below    PLATELET ESTIMATE   Result Value Ref Range    Plt Estimation Normal    MORPHOLOGY   Result Value Ref Range    RBC Morphology Present     Reactive Lymphocytes Few    EKG (NOW)   Result Value Ref Range    Report       [facility] Emergency Dept.    Test Date:  2022-10-14  Pt Name:    KAMAR BLACKBURN             Department: ER  MRN:        [id number]                      Room:  Gender:     Female                       Technician: [id number]  :        [id number]                   Requested By:ER TRIAGE PROTOCOL  Order #:    [id number]                    Reading MD: DELILAH KUO MD    Measurements  Intervals                                Axis  Rate:       81                           P:          32  IN:         165                          QRS:        2  QRSD:       96                           T:          13  QT:         382  QTc:        444    Interpretive Statements  Sinus rhythm  Low voltage, precordial leads  Compared to ECG 10/30/2020 12:29:24  No significant changes  Electronically Signed On 10- 17:11:14 PDT by DELILAH KUO MD          RADIOLOGY  DX-CHEST-PORTABLE (1 VIEW)   Final Result      Hypoinflation without other evidence for acute cardiopulmonary disease.      CT-CTA HEAD WITH & W/O-POST PROCESS   Final Result      1.  No large vessel occlusion, hemodynamically significant stenosis or aneurysm.   2.  Chronic left posterior inferior  cerebellar artery territory infarct.   3.  No acute intracranial abnormality.      CT-CTA NECK WITH & W/O-POST PROCESSING   Final Result      CT angiogram of the neck within normal limits.      CT-CEREBRAL PERFUSION ANALYSIS   Final Result      1.  Cerebral blood flow less than 30% likely representing completed infarct = 0 mL.      2.  T Max more than 6 seconds likely representing combination of completed infarct and ischemia = 0 mL.      3.  Mismatched volume likely representing ischemic brain/penumbra = None      4.  Please note that the cerebral perfusion was performed on the limited brain tissue around the basal ganglia region. Infarct/ischemia outside the CT perfusion sections can be missed in this study.      CT-HEAD W/O   Final Result      1.  No acute intracranial abnormality.   2.  Chronic LEFT inferior cerebellar infarct.           The radiologist's interpretation of all radiological studies have been reviewed by me.      COURSE & MEDICAL DECISION MAKING  Pertinent Labs & Imaging studies reviewed. (See chart for details)    1:47 PM - Patient seen and examined at bedside. Ordered EKG, DX-chest, CT-head w/o, CT-cerebral perfusion analysis, CT-CTA head w/ and w/o, CT-CTA neck w/ and w/o, CBC-diff, CMP, prothrombin, APTT, COD, and troponin to evaluate her symptoms. The differential diagnoses include but are not limited to: complex migraine vs stroke.    3:10 PM - Patient was reevaluated at bedside. Discussed lab and radiology results with the patient and informed them that her CAT scant all came back normal. I suspect the patient has a complex migraine which I will treat with Benadryl 25 mg and Reglan 5 mg.    4:46 PM - I reevaluated the patient at bedside. The patient informs me they feel improved following medication administration. I discussed plan for discharge and follow up as outlined below. The patient is stable for discharge at this time and will return for any new or worsening symptoms. Patient  verbalizes understanding and support with my plan for discharge.      Decision Making:  Patient is coming in with acute onset blurred vision with associated headache and nausea.  On my evaluation the patient has slight dysarthria and slight weakness on the right arm right leg although likely to be a complex migraine stroke protocol.  Neurology evaluated the patient, stroke work-up was negative, gave the patient Reglan and Benadryl.  Patient had improvement of her symptoms and the patient will be discharged home.     The patient will return for new or worsening symptoms and is stable at the time of discharge.    DISPOSITION:  Patient will be discharged home in stable condition.    FOLLOW UP:  [facility], Emergency Dept  [address] [facility] [address] [facility] [id number] [phone]    If symptoms worsen    Kelvin Pearson M.D.  [address]  [address]  [facility] [address]  [phone]          OUTPATIENT MEDICATIONS:  Discharge Medication List as of 10/14/2022  5:32 PM        START taking these medications    Details   ondansetron (ZOFRAN ODT) 4 MG TABLET DISPERSIBLE Take 1 Tablet by mouth every 8 hours as needed for Nausea., Disp-20 Tablet, R-0, Normal             FINAL IMPRESSION  1. Migraine with aura and without status migrainosus, not intractable    2. Nausea    3. Weakness          Lesa BRICEÑO (Sena), am scribing for, and in the presence of, Adam Rosado M.D..    Electronically signed by: Lesa Rodas (Sena), 10/14/2022    IAdam M.D. personally performed the services described in this documentation, as scribed by Leas Rodas in my presence, and it is both accurate and complete.    The note accurately reflects work and decisions made by me.  Adam Rosado M.D.  10/14/2022  7:55 PM

## 2022-10-14 NOTE — PROGRESS NOTES
"Brief Neurology Note    46-year old female with Pmhx stroke (2018, posterior circulation; no residual deficits; thought to be related to tobacco abuse and PFO-- not closed); on ASA only at home; also with obesity, migraine, and GERD who presented to [facility] on 10/14/22 for a chief complaint of a brief, 2-3 min episode of double vision, further described as \"cross eyed and dizzy\" while she was driivng. Denies associated vertigo. Admits to mild headache and nausea. Here, patient admitted to perhaps mild LUE/LLE weakness similar to her stroke; ERP noted question of RUE/RLE weakness/ataxia and slurred speech thus stroke alert called. On my exam patient is non dysarthric, no obvious ataxia; she continues to admit to mild headache and nausea. Stat CT head unremarkable, CTA head/neck with no LVO; CTP normal study. Patient not a candidate for IV thrombolytics given mild/non disabling/resolving deficits. Ddx include migraine, TIA or small acute ischemic stroke (least likely); would recommend to consider treating with migraine cocktail; if symptoms resolve patient may be discharged with plan to follow up with outpatient neurology. If symptoms are persistent, obtain MRI Brain wo contrast. Would also recommend checking lipid panel; add statin to regimen to ensure medical optimization of stroke prevention.     The above has been discussed with Dr. Benitez. Please call with questions.     CRISTOPHER Carmichael.      "

## 2022-10-15 NOTE — ED NOTES
"Pt discharged home. IV discontinued and gauze placed, pt in possession of belongings. Pt provided discharge education and information pertaining to medications and follow up appointments. Pt received copy of discharge instructions and verbalized understanding. /78   Pulse 79   Temp 36.8 °C (98.2 °F) (Temporal)   Resp 18   Ht 1.6 m (5' 3\")   Wt 106 kg (234 lb 9.1 oz)   SpO2 92%   BMI 41.55 kg/m²     "

## 2022-11-02 ENCOUNTER — OFFICE VISIT (OUTPATIENT)
Dept: NEUROLOGY | Facility: MEDICAL CENTER | Age: 47
End: 2022-11-02
Attending: PSYCHIATRY & NEUROLOGY
Payer: COMMERCIAL

## 2022-11-02 VITALS
BODY MASS INDEX: 42.53 KG/M2 | HEART RATE: 88 BPM | WEIGHT: 240.08 LBS | SYSTOLIC BLOOD PRESSURE: 116 MMHG | RESPIRATION RATE: 18 BRPM | DIASTOLIC BLOOD PRESSURE: 72 MMHG | OXYGEN SATURATION: 95 % | TEMPERATURE: 98 F

## 2022-11-02 DIAGNOSIS — F17.200 TOBACCO DEPENDENCE: Primary | ICD-10-CM

## 2022-11-02 PROCEDURE — 99211 OFF/OP EST MAY X REQ PHY/QHP: CPT | Performed by: PSYCHIATRY & NEUROLOGY

## 2022-11-02 PROCEDURE — 99214 OFFICE O/P EST MOD 30 MIN: CPT | Performed by: PSYCHIATRY & NEUROLOGY

## 2022-11-02 RX ORDER — NICOTINE 21 MG/24HR
1 PATCH, TRANSDERMAL 24 HOURS TRANSDERMAL EVERY 24 HOURS
Qty: 42 PATCH | Refills: 0 | Status: SHIPPED | OUTPATIENT
Start: 2022-11-02 | End: 2022-12-14

## 2022-11-02 ASSESSMENT — FIBROSIS 4 INDEX: FIB4 SCORE: 0.66

## 2022-11-02 ASSESSMENT — PATIENT HEALTH QUESTIONNAIRE - PHQ9: CLINICAL INTERPRETATION OF PHQ2 SCORE: 0

## 2022-11-02 NOTE — PROGRESS NOTES
"[facility] NEUROLOGY  GENERAL NEUROLOGY  NEW PATIENT VISIT    Referral source: Adam Rosado M.D.    CC: \"migraine with aura...\"    HISTORY OF ILLNESS:  Bria Martinez is a 46 y.o. woman with a history most notable for migraine, HLD, PFO (s/p closure), tobacco use, and stroke.  Today, she was unaccompanied, and she provided the following history:    2018:  Bria had a stroke.  She came home from dinner, entered the garage, and felt \"20-million-times drunk.\" She felt spinning.  She crawled from the garage to the house.  She tried calling her .  She couldn't walk.  She vomited and then had diarrhea.  After ~30 minutes she called EMS.  Workup included echocardiogram which revealed a PFO.  She was hospitalized in the ICU for 7 days.  She was prescribed rosuvastatin and a \"heavy duty blood thinner.\"  Later, the blood thinner was stopped, and she transitioned to ASA.    A Few Years Ago:  Bria was driving, developed visual symptoms, and pulled over her car.  It felt as if her eyes were jiggling back and forth quickly.  This symptom lasted 1-2 minutes and then resolved.  There were no associated symptoms.    2022:  She was standing in the bathroom and could \"feel it coming on.\"  Her vision became abnormal.      10/14/2022:  Bria was driving when she developed visual symptoms.  When she covered each eye individually her vision was normal out of the un-obscured eye.  With both eyes open her vision was \"blurry.\"  She is unsure whether there was double vision.  Symptoms lasted 1-2 minutes.  There was no option to pull over.  She drove to an area where she could park.  Her vision slowly returned to normal.  During the recovery period she became \"very warm,\" \"flushed,\" and \"sick to her stomach.\"  She did not vomit.  There was no perception of dizziness or lightheadedness.  She called her .  He came to her location and took her to the ED.  While on the way to the hospital she developed a headache. " " Ultimately, symptoms were attributed to migraine with aura.  She was treated with a migraine cocktail.    The following is a summary of headache symptoms, presented in my standard format:    Family History:   Age at onset:   Location:   Radiation:   Frequency: 0-2/month  Duration: ~5 hours  Headache Days/Month:   Quality: \"pounding\"  Intensity: 6-7/10  Aura: visual  Photophobia/Phonophobia/Nausea/Vomiting: no/no/yes/no  Provoked by Physical Activity?:   Triggers:   Associated Symptoms:   Autonomic Signs (such as ptosis, miosis, conjunctival injection, rhinorrhea, increased lacrimation):   Head Trauma:   Association with Menses:   ED Visits:   Hospitalizations:   Missed Work Days:  Sleep: 6-8 hours/night  Caffeine Intake: 0-1  Hydration: has an \"issue\" with this  Nutrition: skips meals when busy  Exercise:   Analgesic Overuse:     Current Medication Regimen:  - acetaminophen: helpful    Medications Tried: Response  Preventive:  -     Abortive:  -     Medications Not Tried:  -     MEDICAL AND SURGICAL HISTORY:  Past Medical History:   Diagnosis Date    Acid reflux disease 08/08/2012    Anesthesia 08/09/2017    uncontrollable shaking    Breath shortness 08/09/2017    no oxygen usage    CHEST PAIN 08/08/2012    Dental disorder 08/09/2017    upper partial    Gynecological disorder     Heart burn     Hypercholesterolemia 08/08/2012    Migraines     Psychiatric problem 08/09/2017    anxiety; and panic attacks    Stroke (HCC)     Tobacco dependence quit 2018 in setting of CVA     Urinary bladder disorder 08/09/2017    leakage with sneezing/movement     Past Surgical History:   Procedure Laterality Date    VAGINAL HYSTERECTOMY SCOPE TOTAL  8/23/2017    Procedure: VAGINAL HYSTERECTOMY SCOPE TOTAL ;  Surgeon: Davin Liriano M.D.;  Location: SURGERY [facility];  Service:     SALPINGECTOMY Bilateral 8/23/2017    Procedure: SALPINGECTOMY;  Surgeon: Davin Liriano M.D.;  Location: SURGERY [facility];  Service:     " CHOLECYSTECTOMY  2009    APPENDECTOMY      BREAST BIOPSY Left     TONSILLECTOMY       MEDICATIONS:  Current Outpatient Medications   Medication Sig    Rosuvastatin Calcium 10 MG CAPSULE SPRINKLE Take 1 Capsule by mouth every evening.    nicotine (NICODERM) 14 MG/24HR PATCH 24 HR Place 1 Patch on the skin every 24 hours for 42 days.    nicotine (NICODERM) 7 MG/24HR PATCH 24 HR Place 1 Patch on the skin every 24 hours for 14 days.    ondansetron (ZOFRAN ODT) 4 MG TABLET DISPERSIBLE Take 1 Tablet by mouth every 8 hours as needed for Nausea.    ASPIRIN 81 PO Take  by mouth.     SOCIAL HISTORY:  Social History     Tobacco Use    Smoking status: Every Day     Packs/day: 1.00     Years: 20.00     Pack years: 20.00     Types: Cigarettes     Last attempt to quit: 2018     Years since quittin.4    Smokeless tobacco: Never   Substance Use Topics    Alcohol use: Yes     Comment: RARELY     Social History     Social History Narrative    Not on file     FAMILY HISTORY:  No family history on file.    REVIEW OF SYSTEMS:  A ROS was completed.  Pertinent positives and negatives were included in the HPI, above.  All other systems were reviewed and are negative.    PHYSICAL EXAM:  General/Medical:  - NAD  - hair, skin, nails, and joints were normal    Neuro:  MENTAL STATUS: awake and alert; no deficits of speech or language; oriented to person, place, and time; affect was appropriate to situation; pleasant, cooperative    CRANIAL NERVES:    II: acuity: J1+/J1+, fields: intact to confrontation, pupils: 3/3 to 2/2 without a relative afferent pupillary defect, discs: sharp    III/IV/VI: versions: intact without nystagmus, choppy pursuits    V: facial sensation: symmetric to light touch    VII: facial expression: symmetric    VIII: hearing: intact to finger rub    IX/X: palate: elevates symmetrically    XI: shoulder shrug: symmetric    XII: tongue: midline    MOTOR:  - bulk: normal throughout  - tone: NT  Upper Extremity Strength   (R/L)    5/5   Elbow flexion 5/5   Elbow extension 5/5   Shoulder abduction 5/5     Lower Extremity Strength  (R/L)   Hip flexion 5/5   Knee extension 5/5   Knee flexion NT   Ankle plantarflexion NT   Ankle dorsiflexion NT     - can walk on toes and heels  - pronator drift: absent  - abnormal movements: none    SENSATION:  - light touch: grossly intact over the upper- and lower extremities  - vibration (R/L, seconds): NT/NT at the great toes  - pinprick: NT  - proprioception: NT  - Romberg: absent    COORDINATION:  - finger to nose: normal, no ataxia on exam  - finger tapping: rapid and accurate, bilaterally    REFLEXES:  Reflex Right Left   BR 2+ 2+   Biceps 2+ 2+   Triceps 2+ 2+   Patellae 2+ 2+   Achilles NT NT   Toes NT NT     GAIT:  - narrow base and normal  - heel-raised/toe-raised gait: intact/intact  - tandem gait: intact    REVIEW OF IMAGING STUDIES:  I reviewed the CT head and CTA head/neck reports.  CT head was notable for a chronic left inferior cerebellar infarct.    REVIEW OF LABORATORY STUDIES:  10/14/2022:  - CBC w/ diff: within acceptable limits  - CMP: notable for HbA1c: 6.0    ASSESSMENT:  Bria Martinez is a 46 y.o. woman with possible migraine with brainstem aura and a history otherwise notable for HLD, PFO (s/p closure), tobacco use, and stroke (left inferior cerebellar).  The symptoms could be related to migraine with brainstem aura.  Her headaches are infrequent enough that I do not recommend a daily preventative agent.  Her  visual symptoms which could represent aura are so brief that I do not think a specific rescue medication is necessarily indicated.  She prefers to treat headache pain with acetaminophen.  She will keep a log of her symptoms.  Otherwise, I provided counseling regarding smoking cessation and prescribed nicotine patches.  We will follow-up in a few months.    PLAN:  Migraine w/ Aura:  Prevention:  - could try supplementing:  - magnesium: 400-600 mg once or 200-300  mg twice daily  - riboflavin (vitamin B2): 400 mg once daily  - coenzyme Q10: 300 mg daily  - get 7-9 hours of sleep per night; can try supplementing melatonin 2-10 mg, 2-3 hours before bedtime  - drink plenty of fluids (urine should be nearly clear)  - avoid excessive caffeine intake (no more than 2 servings per day and nothing in the afternoon)  - eat regular meals (don't skip meals)  - get moderate exercise (even just a 20 minute walk daily)    Rescue:  - do not use analgesics (e.g., ibuprofen, acetaminophen) more than 2 days per week in order to avoid analgesic rebound headaches    - keep a headache log    Tobacco Use:  - counseling provided  - nicotine patches    Follow-Up:  - Return in about 2 months (around 1/2/2023).    Signed: Red Heredia M.D.

## 2023-01-27 ENCOUNTER — TELEPHONE (OUTPATIENT)
Dept: NEUROLOGY | Facility: MEDICAL CENTER | Age: 48
End: 2023-01-27
Payer: COMMERCIAL

## 2023-02-01 ENCOUNTER — APPOINTMENT (OUTPATIENT)
Dept: NEUROLOGY | Facility: MEDICAL CENTER | Age: 48
End: 2023-02-01
Attending: PSYCHIATRY & NEUROLOGY
Payer: COMMERCIAL

## 2023-03-21 ENCOUNTER — APPOINTMENT (OUTPATIENT)
Dept: NEUROLOGY | Facility: MEDICAL CENTER | Age: 48
End: 2023-03-21
Attending: PSYCHIATRY & NEUROLOGY
Payer: COMMERCIAL

## 2024-01-23 ENCOUNTER — HOSPITAL ENCOUNTER (OUTPATIENT)
Facility: MEDICAL CENTER | Age: 49
End: 2024-01-24
Attending: EMERGENCY MEDICINE | Admitting: HOSPITALIST
Payer: COMMERCIAL

## 2024-01-23 ENCOUNTER — APPOINTMENT (OUTPATIENT)
Dept: RADIOLOGY | Facility: MEDICAL CENTER | Age: 49
End: 2024-01-23
Attending: EMERGENCY MEDICINE
Payer: COMMERCIAL

## 2024-01-23 DIAGNOSIS — R07.9 CHEST PAIN, UNSPECIFIED TYPE: ICD-10-CM

## 2024-01-23 DIAGNOSIS — Z72.0 TOBACCO ABUSE: ICD-10-CM

## 2024-01-23 DIAGNOSIS — E66.01 OBESITIES, MORBID (HCC): ICD-10-CM

## 2024-01-23 LAB
ALBUMIN SERPL BCP-MCNC: 3.9 G/DL (ref 3.2–4.9)
ALBUMIN/GLOB SERPL: 1.3 G/DL
ALP SERPL-CCNC: 68 U/L (ref 30–99)
ALT SERPL-CCNC: 31 U/L (ref 2–50)
ANION GAP SERPL CALC-SCNC: 12 MMOL/L (ref 7–16)
AST SERPL-CCNC: 24 U/L (ref 12–45)
BASOPHILS # BLD AUTO: 0.3 % (ref 0–1.8)
BASOPHILS # BLD: 0.03 K/UL (ref 0–0.12)
BILIRUB SERPL-MCNC: 0.2 MG/DL (ref 0.1–1.5)
BUN SERPL-MCNC: 13 MG/DL (ref 8–22)
CALCIUM ALBUM COR SERPL-MCNC: 8.8 MG/DL (ref 8.5–10.5)
CALCIUM SERPL-MCNC: 8.7 MG/DL (ref 8.5–10.5)
CHLORIDE SERPL-SCNC: 104 MMOL/L (ref 96–112)
CO2 SERPL-SCNC: 21 MMOL/L (ref 20–33)
CREAT SERPL-MCNC: 0.57 MG/DL (ref 0.5–1.4)
EKG IMPRESSION: NORMAL
EOSINOPHIL # BLD AUTO: 0.29 K/UL (ref 0–0.51)
EOSINOPHIL NFR BLD: 2.7 % (ref 0–6.9)
ERYTHROCYTE [DISTWIDTH] IN BLOOD BY AUTOMATED COUNT: 41.1 FL (ref 35.9–50)
GFR SERPLBLD CREATININE-BSD FMLA CKD-EPI: 111 ML/MIN/1.73 M 2
GLOBULIN SER CALC-MCNC: 3.1 G/DL (ref 1.9–3.5)
GLUCOSE SERPL-MCNC: 90 MG/DL (ref 65–99)
HCT VFR BLD AUTO: 44.3 % (ref 37–47)
HGB BLD-MCNC: 15.1 G/DL (ref 12–16)
IMM GRANULOCYTES # BLD AUTO: 0.05 K/UL (ref 0–0.11)
IMM GRANULOCYTES NFR BLD AUTO: 0.5 % (ref 0–0.9)
LYMPHOCYTES # BLD AUTO: 3.98 K/UL (ref 1–4.8)
LYMPHOCYTES NFR BLD: 37.4 % (ref 22–41)
MCH RBC QN AUTO: 31.1 PG (ref 27–33)
MCHC RBC AUTO-ENTMCNC: 34.1 G/DL (ref 32.2–35.5)
MCV RBC AUTO: 91.3 FL (ref 81.4–97.8)
MONOCYTES # BLD AUTO: 0.92 K/UL (ref 0–0.85)
MONOCYTES NFR BLD AUTO: 8.7 % (ref 0–13.4)
NEUTROPHILS # BLD AUTO: 5.36 K/UL (ref 1.82–7.42)
NEUTROPHILS NFR BLD: 50.4 % (ref 44–72)
NRBC # BLD AUTO: 0 K/UL
NRBC BLD-RTO: 0 /100 WBC (ref 0–0.2)
PLATELET # BLD AUTO: 221 K/UL (ref 164–446)
PMV BLD AUTO: 10.5 FL (ref 9–12.9)
POTASSIUM SERPL-SCNC: 3.9 MMOL/L (ref 3.6–5.5)
PROT SERPL-MCNC: 7 G/DL (ref 6–8.2)
RBC # BLD AUTO: 4.85 M/UL (ref 4.2–5.4)
SODIUM SERPL-SCNC: 137 MMOL/L (ref 135–145)
T4 FREE SERPL-MCNC: 1.06 NG/DL (ref 0.93–1.7)
TROPONIN T SERPL-MCNC: <6 NG/L (ref 6–19)
TROPONIN T SERPL-MCNC: <6 NG/L (ref 6–19)
TSH SERPL DL<=0.005 MIU/L-ACNC: 2.03 UIU/ML (ref 0.38–5.33)
WBC # BLD AUTO: 10.6 K/UL (ref 4.8–10.8)

## 2024-01-23 PROCEDURE — 84443 ASSAY THYROID STIM HORMONE: CPT

## 2024-01-23 PROCEDURE — 700111 HCHG RX REV CODE 636 W/ 250 OVERRIDE (IP): Mod: JZ | Performed by: HOSPITALIST

## 2024-01-23 PROCEDURE — 99406 BEHAV CHNG SMOKING 3-10 MIN: CPT | Performed by: HOSPITALIST

## 2024-01-23 PROCEDURE — G0378 HOSPITAL OBSERVATION PER HR: HCPCS

## 2024-01-23 PROCEDURE — 93005 ELECTROCARDIOGRAM TRACING: CPT | Performed by: HOSPITALIST

## 2024-01-23 PROCEDURE — 93005 ELECTROCARDIOGRAM TRACING: CPT | Performed by: EMERGENCY MEDICINE

## 2024-01-23 PROCEDURE — A9270 NON-COVERED ITEM OR SERVICE: HCPCS | Performed by: HOSPITALIST

## 2024-01-23 PROCEDURE — 85025 COMPLETE CBC W/AUTO DIFF WBC: CPT

## 2024-01-23 PROCEDURE — 84484 ASSAY OF TROPONIN QUANT: CPT

## 2024-01-23 PROCEDURE — 99223 1ST HOSP IP/OBS HIGH 75: CPT | Mod: 25 | Performed by: HOSPITALIST

## 2024-01-23 PROCEDURE — 80053 COMPREHEN METABOLIC PANEL: CPT

## 2024-01-23 PROCEDURE — 84439 ASSAY OF FREE THYROXINE: CPT

## 2024-01-23 PROCEDURE — 36415 COLL VENOUS BLD VENIPUNCTURE: CPT

## 2024-01-23 PROCEDURE — 93005 ELECTROCARDIOGRAM TRACING: CPT

## 2024-01-23 PROCEDURE — 96372 THER/PROPH/DIAG INJ SC/IM: CPT

## 2024-01-23 PROCEDURE — 700102 HCHG RX REV CODE 250 W/ 637 OVERRIDE(OP): Performed by: HOSPITALIST

## 2024-01-23 PROCEDURE — 71045 X-RAY EXAM CHEST 1 VIEW: CPT

## 2024-01-23 PROCEDURE — 99285 EMERGENCY DEPT VISIT HI MDM: CPT

## 2024-01-23 RX ORDER — ACETAMINOPHEN 325 MG/1
650 TABLET ORAL EVERY 6 HOURS PRN
Status: DISCONTINUED | OUTPATIENT
Start: 2024-01-23 | End: 2024-01-24 | Stop reason: HOSPADM

## 2024-01-23 RX ORDER — CHLORAL HYDRATE 500 MG
1000 CAPSULE ORAL DAILY
COMMUNITY

## 2024-01-23 RX ORDER — AMOXICILLIN 250 MG
2 CAPSULE ORAL 2 TIMES DAILY
Status: DISCONTINUED | OUTPATIENT
Start: 2024-01-23 | End: 2024-01-24 | Stop reason: HOSPADM

## 2024-01-23 RX ORDER — BISACODYL 10 MG
10 SUPPOSITORY, RECTAL RECTAL
Status: DISCONTINUED | OUTPATIENT
Start: 2024-01-23 | End: 2024-01-24 | Stop reason: HOSPADM

## 2024-01-23 RX ORDER — NICOTINE 21 MG/24HR
21 PATCH, TRANSDERMAL 24 HOURS TRANSDERMAL
Status: DISCONTINUED | OUTPATIENT
Start: 2024-01-23 | End: 2024-01-24 | Stop reason: HOSPADM

## 2024-01-23 RX ORDER — ASPIRIN 81 MG/1
81 TABLET ORAL DAILY
Status: DISCONTINUED | OUTPATIENT
Start: 2024-01-24 | End: 2024-01-24 | Stop reason: HOSPADM

## 2024-01-23 RX ORDER — NITROGLYCERIN 0.4 MG/1
0.4 TABLET SUBLINGUAL
Status: DISCONTINUED | OUTPATIENT
Start: 2024-01-23 | End: 2024-01-24 | Stop reason: HOSPADM

## 2024-01-23 RX ORDER — VITAMIN B COMPLEX
1000 TABLET ORAL DAILY
COMMUNITY

## 2024-01-23 RX ORDER — ROSUVASTATIN CALCIUM 20 MG/1
20 TABLET, COATED ORAL EVERY EVENING
COMMUNITY

## 2024-01-23 RX ORDER — ASPIRIN 81 MG/1
81 TABLET ORAL DAILY
COMMUNITY

## 2024-01-23 RX ORDER — ASCORBIC ACID 500 MG
500 TABLET ORAL DAILY
COMMUNITY

## 2024-01-23 RX ORDER — ENOXAPARIN SODIUM 100 MG/ML
40 INJECTION SUBCUTANEOUS DAILY
Status: DISCONTINUED | OUTPATIENT
Start: 2024-01-23 | End: 2024-01-24 | Stop reason: HOSPADM

## 2024-01-23 RX ORDER — POLYETHYLENE GLYCOL 3350 17 G/17G
1 POWDER, FOR SOLUTION ORAL
Status: DISCONTINUED | OUTPATIENT
Start: 2024-01-23 | End: 2024-01-24 | Stop reason: HOSPADM

## 2024-01-23 RX ORDER — ROSUVASTATIN CALCIUM 20 MG/1
20 TABLET, COATED ORAL EVERY EVENING
Status: DISCONTINUED | OUTPATIENT
Start: 2024-01-23 | End: 2024-01-24 | Stop reason: HOSPADM

## 2024-01-23 RX ORDER — ZINC GLUCONATE 50 MG
50 TABLET ORAL DAILY
COMMUNITY

## 2024-01-23 RX ADMIN — ENOXAPARIN SODIUM 40 MG: 100 INJECTION SUBCUTANEOUS at 18:27

## 2024-01-23 RX ADMIN — NICOTINE TRANSDERMAL SYSTEM 21 MG: 21 PATCH, EXTENDED RELEASE TRANSDERMAL at 18:28

## 2024-01-23 RX ADMIN — ROSUVASTATIN CALCIUM 20 MG: 20 TABLET, FILM COATED ORAL at 18:27

## 2024-01-23 ASSESSMENT — PATIENT HEALTH QUESTIONNAIRE - PHQ9
1. LITTLE INTEREST OR PLEASURE IN DOING THINGS: NOT AT ALL
SUM OF ALL RESPONSES TO PHQ9 QUESTIONS 1 AND 2: 0
1. LITTLE INTEREST OR PLEASURE IN DOING THINGS: NOT AT ALL
2. FEELING DOWN, DEPRESSED, IRRITABLE, OR HOPELESS: NOT AT ALL
SUM OF ALL RESPONSES TO PHQ9 QUESTIONS 1 AND 2: 0
2. FEELING DOWN, DEPRESSED, IRRITABLE, OR HOPELESS: NOT AT ALL

## 2024-01-23 ASSESSMENT — LIFESTYLE VARIABLES
AVERAGE NUMBER OF DAYS PER WEEK YOU HAVE A DRINK CONTAINING ALCOHOL: 3
HAVE PEOPLE ANNOYED YOU BY CRITICIZING YOUR DRINKING: NO
TOTAL SCORE: 0
REASON UNABLE TO ASSESS: N
HAVE YOU EVER FELT YOU SHOULD CUT DOWN ON YOUR DRINKING: NO
EVER FELT BAD OR GUILTY ABOUT YOUR DRINKING: NO
EVER HAD A DRINK FIRST THING IN THE MORNING TO STEADY YOUR NERVES TO GET RID OF A HANGOVER: NO
TOTAL SCORE: 0
HOW MANY TIMES IN THE PAST YEAR HAVE YOU HAD 5 OR MORE DRINKS IN A DAY: 0
ALCOHOL_USE: YES
ON A TYPICAL DAY WHEN YOU DRINK ALCOHOL HOW MANY DRINKS DO YOU HAVE: 1
CONSUMPTION TOTAL: NEGATIVE
TOTAL SCORE: 0

## 2024-01-23 ASSESSMENT — PAIN DESCRIPTION - DESCRIPTORS: DESCRIPTORS: ACHING

## 2024-01-23 ASSESSMENT — FIBROSIS 4 INDEX: FIB4 SCORE: 0.96

## 2024-01-23 ASSESSMENT — PAIN DESCRIPTION - PAIN TYPE: TYPE: ACUTE PAIN

## 2024-01-23 NOTE — ED PROVIDER NOTES
ED Provider Note    CHIEF COMPLAINT  Chief Complaint   Patient presents with    Arm Pain     Patient complains of left arm pain with a short episode or jaw and back pain that started at 1000 this morning. Patient reports nausea. No SOB.       EXTERNAL RECORDS REVIEWED  Inpatient Notes reviewed her 7-day admission back in May 2018 for CVA secondary to PFO that she ultimately had closed after that admission    HPI/ROS  LIMITATION TO HISTORY   Select: : None    Bria Martinez is a 48 y.o. female who presents with report that she began having acute onset at rest left arm pain radiating to her jaw and back and simultaneously had diaphoresis.  This happened once and lasted about 10 to 15 minutes.  Had some nausea but no vomiting and does not feel short of breath and denies any leg pain or swelling.  Does have a history of smoking and obesity.  Was prediabetic but that improved.  Has a family history.  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    SURGICAL HISTORY   has a past surgical history that includes tonsillectomy; cholecystectomy (2009); appendectomy; breast biopsy (Left); vaginal hysterectomy scope total (8/23/2017); and salpingectomy (Bilateral, 8/23/2017).    FAMILY HISTORY  History reviewed. No pertinent family history.    SOCIAL HISTORY  Social History     Tobacco Use    Smoking status: Every Day     Current packs/day: 0.00     Average packs/day: 1 pack/day for 20.0 years (20.0 ttl pk-yrs)     Types: Cigarettes     Start date:  "1998     Last attempt to quit: 2018     Years since quittin.6    Smokeless tobacco: Never   Vaping Use    Vaping Use: Never used   Substance and Sexual Activity    Alcohol use: Yes     Comment: RARELY    Drug use: No    Sexual activity: Not on file       CURRENT MEDICATIONS  Home Medications       Reviewed by Deb Pizano R.N. (Registered Nurse) on 24 at 1211  Med List Status: Partial     Medication Last Dose Status   ASPIRIN 81 PO  Active   ondansetron (ZOFRAN ODT) 4 MG TABLET DISPERSIBLE  Active   Rosuvastatin Calcium 10 MG CAPSULE SPRINKLE  Active                    ALLERGIES  Allergies   Allergen Reactions    Bee Anaphylaxis    Augmentin Rash     Pt states \"I get a bad rash\".      Latex Rash     Pt states \"I get a bad rash\".    Neosporin Lip Health Hives     Blisters and swelling at site    Other Misc Rash     Nail acrylic- Pt states \"I get a bad rash\".    Sulfa Drugs Hives, Itching and Swelling    Tape Rash     Pt states \"I get a bad rash\".  Paper tape ok       PHYSICAL EXAM  VITAL SIGNS: /89   Pulse 73   Temp 36.2 °C (97.2 °F) (Temporal)   Resp 16   SpO2 96%    Constitutional: Well developed, Well nourished, No acute distress, Non-toxic appearance.   HENT: Normocephalic, Atraumatic, Bilateral external ears normal, Oropharynx is clear mucous membranes are moist. No oral exudates or nasal discharge.   Eyes: Pupils are equal round and reactive, EOMI, Conjunctiva normal, No discharge.   Neck: Normal range of motion, No tenderness, Supple, No stridor. No meningismus.  Lymphatic: No lymphadenopathy noted.   Cardiovascular: Regular rate and rhythm without murmur rub or gallop.  Thorax & Lungs: Clear breath sounds bilaterally without wheezes, rhonchi or rales. There is no chest wall tenderness.   Abdomen: Soft non-tender non-distended. There is no rebound or guarding. No organomegaly is appreciated. Bowel sounds are normal.  Skin: Normal without rash.   Back: No CVA or spinal " tenderness.   Extremities: Intact distal pulses, No edema, No tenderness, No cyanosis, No clubbing. Capillary refill is less than 2 seconds.  Musculoskeletal: Good range of motion in all major joints. No tenderness to palpation or major deformities noted.   Neurologic: Alert & oriented x 3, Normal motor function, Normal sensory function, No focal deficits noted. Reflexes are normal.  Psychiatric: Affect normal, Judgment normal, Mood normal. There is no suicidal ideation or patient reported hallucinations.         DIAGNOSTIC STUDIES / PROCEDURES  EKG  I have independently interpreted this EKG  Results for orders placed or performed during the hospital encounter of 24   EKG   Result Value Ref Range    Report       [facility] Emergency Dept.    Test Date:  2024  Pt Name:    KAMAR BLACKBURN             Department: ER  MRN:        [id number]                      Room:  Gender:     Female                       Technician: [id number]  :        1975                   Requested By:ER TRIAGE PROTOCOL  Order #:    [id number]                    Reading MD: DARRYL ELLISON MD    Measurements  Intervals                                Axis  Rate:       65                           P:          46  KS:         151                          QRS:        11  QRSD:       84                           T:          24  QT:         422  QTc:        439    Interpretive Statements  Sinus rhythm  Compared to ECG 10/14/2022 11:41:51  No significant changes  Electronically Signed On 2024 14:37:35 PST by DARRYL ELLISON MD           LABS  Normal initial troponin    RADIOLOGY  I have independently interpreted the diagnostic imaging associated with this visit and am waiting the final reading from the radiologist.   My preliminary interpretation is as follows: No widened mediastinum    Radiologist interpretation:   DX-CHEST-PORTABLE (1 VIEW)   Final Result         No acute cardiac or pulmonary abnormality is  identified.            COURSE & MEDICAL DECISION MAKING    ED Observation Status? No; Patient does not meet criteria for ED Observation.     INITIAL ASSESSMENT, COURSE AND PLAN  Care Narrative: Had a high suspicion for acute coronary syndrome and heart score ultimately ended up at 5.  Diaphoresis by history was quite concerning given other risk factors increasing my suspicion and she is a smoker    Ryder Peters M.D. spent greater than 3 minutes with the patient explaining the importance of smoking cessation.     Laboratory evaluation reveals no leukocytosis, shift, anemia, electrolyte derangements or acidosis.    Chest x-ray is unremarkable showing no evidence of widened mediastinum or cardiomegaly.    I discussed her heart score of 5 and need for admission to the CDU and she is agreeable to this plan of care especially given her history of PFO leading to CVA in the past.    DISPOSITION AND DISCUSSIONS  I have discussed management of the patient with the following physicians and TAMIE's: Spoke with [facility] hospitalist and they are happy to the admit the patient to the CDU for further workup      FINAL DIAGNOSIS  1. Chest pain, unspecified type    2. Obesities, morbid (HCC)    3. Tobacco abuse           Electronically signed by: Ryder Peters M.D., 1/23/2024 2:39 PM

## 2024-01-23 NOTE — ED TRIAGE NOTES
Chief Complaint   Patient presents with    Arm Pain     Patient complains of left arm pain with a short episode or jaw and back pain that started at 1000 this morning. Patient reports nausea. No SOB.        Patient educated on triage process. Informed to notified ED staff of change in symptoms.     Vitals:    01/23/24 1200   BP: 135/89   Pulse: 73   Resp: 16   Temp: 36.2 °C (97.2 °F)   SpO2: 96%

## 2024-01-24 ENCOUNTER — APPOINTMENT (OUTPATIENT)
Dept: RADIOLOGY | Facility: MEDICAL CENTER | Age: 49
End: 2024-01-24
Attending: HOSPITALIST
Payer: COMMERCIAL

## 2024-01-24 ENCOUNTER — PATIENT OUTREACH (OUTPATIENT)
Dept: SCHEDULING | Facility: IMAGING CENTER | Age: 49
End: 2024-01-24

## 2024-01-24 VITALS
RESPIRATION RATE: 16 BRPM | DIASTOLIC BLOOD PRESSURE: 61 MMHG | WEIGHT: 241.62 LBS | OXYGEN SATURATION: 94 % | HEIGHT: 63 IN | BODY MASS INDEX: 42.81 KG/M2 | HEART RATE: 92 BPM | SYSTOLIC BLOOD PRESSURE: 119 MMHG | TEMPERATURE: 97.7 F

## 2024-01-24 PROBLEM — R07.9 CHEST PAIN: Status: RESOLVED | Noted: 2024-01-23 | Resolved: 2024-01-24

## 2024-01-24 PROBLEM — Z86.73 HISTORY OF EMBOLIC STROKE: Status: ACTIVE | Noted: 2018-05-29

## 2024-01-24 LAB
CHOLEST SERPL-MCNC: 196 MG/DL (ref 100–199)
EKG IMPRESSION: NORMAL
EKG IMPRESSION: NORMAL
HDLC SERPL-MCNC: 32 MG/DL
LDLC SERPL CALC-MCNC: 131 MG/DL
TRIGL SERPL-MCNC: 163 MG/DL (ref 0–149)

## 2024-01-24 PROCEDURE — 99239 HOSP IP/OBS DSCHRG MGMT >30: CPT | Mod: FS | Performed by: STUDENT IN AN ORGANIZED HEALTH CARE EDUCATION/TRAINING PROGRAM

## 2024-01-24 PROCEDURE — 700102 HCHG RX REV CODE 250 W/ 637 OVERRIDE(OP): Performed by: HOSPITALIST

## 2024-01-24 PROCEDURE — 80061 LIPID PANEL: CPT

## 2024-01-24 PROCEDURE — A9270 NON-COVERED ITEM OR SERVICE: HCPCS | Performed by: HOSPITALIST

## 2024-01-24 PROCEDURE — 93017 CV STRESS TEST TRACING ONLY: CPT

## 2024-01-24 PROCEDURE — 93010 ELECTROCARDIOGRAM REPORT: CPT | Mod: 76 | Performed by: INTERNAL MEDICINE

## 2024-01-24 PROCEDURE — G0378 HOSPITAL OBSERVATION PER HR: HCPCS

## 2024-01-24 RX ORDER — NICOTINE 21 MG/24HR
1 PATCH, TRANSDERMAL 24 HOURS TRANSDERMAL EVERY 24 HOURS
Qty: 30 PATCH | Refills: 2 | Status: SHIPPED | OUTPATIENT
Start: 2024-01-24

## 2024-01-24 RX ORDER — BUPROPION HYDROCHLORIDE 150 MG/1
TABLET ORAL
Qty: 57 TABLET | Refills: 1 | Status: SHIPPED | OUTPATIENT
Start: 2024-01-24 | End: 2024-02-23

## 2024-01-24 RX ADMIN — NICOTINE TRANSDERMAL SYSTEM 21 MG: 21 PATCH, EXTENDED RELEASE TRANSDERMAL at 05:36

## 2024-01-24 RX ADMIN — ASPIRIN 81 MG: 81 TABLET, COATED ORAL at 05:35

## 2024-01-24 RX ADMIN — ACETAMINOPHEN 650 MG: 325 TABLET, FILM COATED ORAL at 05:38

## 2024-01-24 ASSESSMENT — PAIN DESCRIPTION - PAIN TYPE: TYPE: ACUTE PAIN

## 2024-01-24 NOTE — CARE PLAN
The patient is Stable - Low risk of patient condition declining or worsening    Shift Goals  Clinical Goals: Pain control, tele monitoring, NPO at midnight for stress  Patient Goals: Comfort  Family Goals: Not at bedside    Progress made toward(s) clinical / shift goals:      Problem: Pain - Standard  Goal: Alleviation of pain or a reduction in pain to the patient’s comfort goal  Outcome: Progressing     Problem: Knowledge Deficit - Standard  Goal: Patient and family/care givers will demonstrate understanding of plan of care, disease process/condition, diagnostic tests and medications  Outcome: Progressing       Patient is not progressing towards the following goals:

## 2024-01-24 NOTE — PROGRESS NOTES
2 RN Skin Check    2 RN skin check complete. Phuong RN& Ronal ACT-A     Head WDL  Ears WDL  Nose WDL  Mouth WDL  Neck WDL  Breast/Chest WDL  Shoulder Blades WDL  Spine WDL  (R) Arm/Elbow/Hand  WNL  (L) Arm/Elbow-  Abdomen WDL  Groin WDL  Scrotum/Coccyx/Buttocks WDL  (R) Leg WDL  (L) Leg WDL  (R) Heel/Foot/Toe WDL  (L) Heel/Foot/Toe WDL   Devices In Place : Pulse Ox  nterventions In Place Pillows  Possible Skin Injury No   Pictures Uploaded Into Epic N/A  Wound Consult Placed N/A  RN Wound Prevention Protocol Ordered No

## 2024-01-24 NOTE — ASSESSMENT & PLAN NOTE
patient counseled on smoking cessation she agrees to try nicotine replacement therapy  Total time spent counseling patient on smoking cessation was 5 minutes

## 2024-01-24 NOTE — PROGRESS NOTES
Bria brought from ED with c/o left arm pain radiating to back,on arrival pt awake,a&ox4,no c/o chest pain,poc explained,call light at bedside,instruct to call for any chest pain,pt not in distress.

## 2024-01-24 NOTE — ASSESSMENT & PLAN NOTE
The ASCVD Risk score (Cristian LOPEZ, et al., 2019) failed to calculate for the following reasons:    The patient has a prior MI or stroke diagnosis    Given her multiple risk cardiovascular risk factors patient will be admitted and monitored on telemetry will continue aspirin reinforce compliance  Continue Crestor  Check lipid panel  Check serial troponin if they remain negative we will set her up for nuclear medicine exercise stress test  I reviewed the patient's initial troponin which was normal   I reviewed CMP and CBC  I reviewed her chest x-ray which is negative for acute pathology  I reviewed her EKG which is negative for acute ischemic changes

## 2024-01-24 NOTE — PROGRESS NOTES
Received report from day shift RN. Assumed care. Pt A&Ox4, NAD, VSS on RA. Tele monitor in place. Denies pain. POC discussed with patient, all questions addressed. Call light within reach.

## 2024-01-24 NOTE — PROGRESS NOTES
Monitor summary: SR 78-95, TN .16, QRS .06, QT .43, no ectopy per strip from monitor room.    No strip available.

## 2024-01-24 NOTE — H&P
[facility] Medicine History & Physical Note    Date of Service  1/23/2024    Primary Care Physician  Kelvin Pearson M.D.    Consultants      Code Status  Full Code    Chief Complaint  Chief Complaint   Patient presents with    Arm Pain     Patient complains of left arm pain with a short episode or jaw and back pain that started at 1000 this morning. Patient reports nausea. No SOB.       History of Presenting Illness  Bria Martinez is a 48 y.o. female who presented 1/23/2024 with history of stroke and PFO s/p closure presented to the emergency department for evaluation of chest pain.  Reports that earlier today he started to develop chest pain in her left upper chest radiating to her left arm jaw and back associated with diaphoresis and dyspnea and lasting about 10 minutes.  She had associated nausea.  No vomiting no hematemesis.  She felt lightheaded but no loss of consciousness.  She presented to the emergency department for further evaluation  Her chest pain has since resolved with no recurrence.  She denies any specific exacerbating or relieving factors.  She has been maintained on Crestor and aspirin but has not been very compliant with her aspirin frequently missing doses.  She smokes about 10 to 15 cigarettes daily    I discussed the plan of care with patient and ED physician .    Review of Systems  Review of Systems   All other systems reviewed and are negative.      Past Medical History   has a past medical history of Acid reflux disease (08/08/2012), Anesthesia (08/09/2017), Breath shortness (08/09/2017), CHEST PAIN (08/08/2012), Dental disorder (08/09/2017), Gynecological disorder, Heart burn, Hypercholesterolemia (08/08/2012), Migraines, Psychiatric problem (08/09/2017), Stroke (HCC), Tobacco dependence quit 2018 in setting of CVA, and Urinary bladder disorder (08/09/2017).    Surgical History   has a past surgical history that includes tonsillectomy; cholecystectomy (2009); appendectomy; breast  "biopsy (Left); vaginal hysterectomy scope total (8/23/2017); and salpingectomy (Bilateral, 8/23/2017).     Family History    Family history reviewed with patient. There is family history that is pertinent to the chief complaint.     Social History   reports that she has been smoking cigarettes. She started smoking about 25 years ago. She has a 20.0 pack-year smoking history. She has never used smokeless tobacco. She reports current alcohol use. She reports that she does not use drugs.    Allergies  Allergies   Allergen Reactions    Bee Anaphylaxis    Augmentin Rash     Pt states \"I get a bad rash\".      Latex Rash     Pt states \"I get a bad rash\".    Neosporin Lip Health Hives     Blisters and swelling at site    Other Misc Rash     Nail acrylic- Pt states \"I get a bad rash\".    Sulfa Drugs Hives, Itching and Swelling    Tape Rash     Pt states \"I get a bad rash\".  Paper tape ok       Medications  Prior to Admission Medications   Prescriptions Last Dose Informant Patient Reported? Taking?   Calcium-Magnesium-Zinc 333..33-5 MG Tab 1/22/2024 at 1730 Patient Yes Yes   Sig: Take 1 Tablet by mouth every day.   ELDERBERRY PO 1/22/2024 at 1730 Patient Yes Yes   Sig: Take 2 Each by mouth every day.   MAGNESIUM GLUCONATE PO 1/22/2024 at 1730 Patient Yes Yes   Sig: Take 1 Tablet by mouth every day.   Omega-3 Fatty Acids (FISH OIL) 1000 MG Cap capsule 1/22/2024 at 1730 Patient Yes Yes   Sig: Take 1,000 mg by mouth every day.   ascorbic acid (VITAMIN C) 500 MG tablet 1/22/2024 at 1730 Patient Yes Yes   Sig: Take 500 mg by mouth every day.   aspirin 81 MG EC tablet 1/23/2024 at 0800 Patient Yes Yes   Sig: Take 81 mg by mouth every day.   coenzyme Q-10 30 MG capsule 1/22/2024 at 1730 Patient Yes Yes   Sig: Take 30 mg by mouth every day.   rosuvastatin (CRESTOR) 20 MG Tab 1/22/2024 at 2130 Patient Yes Yes   Sig: Take 20 mg by mouth every evening.   vitamin D3 (CHOLECALCIFEROL) 1000 Unit (25 mcg) Tab 1/22/2024 at 1730 Patient " Yes Yes   Sig: Take 1,000 Units by mouth every day.   zinc gluconate 50 MG Tab tablet 1/22/2024 at 1730 Patient Yes Yes   Sig: Take 50 mg by mouth every day.      Facility-Administered Medications: None       Physical Exam  Temp:  [36.2 °C (97.2 °F)] 36.2 °C (97.2 °F)  Pulse:  [62-73] 62  Resp:  [16-18] 18  BP: ()/(63-89) 99/65  SpO2:  [94 %-96 %] 94 %  Blood Pressure: 99/65   Temperature: 36.2 °C (97.2 °F)   Pulse: 62   Respiration: 18   Pulse Oximetry: 94 %       Physical Exam  Vitals and nursing note reviewed.   Constitutional:       General: She is not in acute distress.     Appearance: She is obese.   HENT:      Head: Normocephalic and atraumatic.      Nose: Nose normal. No rhinorrhea.      Mouth/Throat:      Pharynx: No oropharyngeal exudate or posterior oropharyngeal erythema.   Eyes:      General: No scleral icterus.        Right eye: No discharge.         Left eye: No discharge.   Cardiovascular:      Rate and Rhythm: Normal rate and regular rhythm.      Heart sounds: Normal heart sounds. No murmur heard.     No friction rub. No gallop.   Pulmonary:      Effort: Pulmonary effort is normal. No respiratory distress.      Breath sounds: Normal breath sounds. No stridor. No wheezing, rhonchi or rales.   Chest:      Chest wall: No tenderness.   Abdominal:      General: Bowel sounds are normal. There is no distension.      Palpations: Abdomen is soft. There is no mass.      Tenderness: There is no abdominal tenderness. There is no rebound.   Musculoskeletal:         General: Swelling (Trace edema) present. No tenderness.      Cervical back: Neck supple. No rigidity.   Skin:     General: Skin is warm and dry.      Coloration: Skin is not cyanotic or jaundiced.      Nails: There is no clubbing.   Neurological:      General: No focal deficit present.      Mental Status: She is alert and oriented to person, place, and time.      Cranial Nerves: No cranial nerve deficit.      Motor: No weakness.   Psychiatric:    "      Mood and Affect: Mood normal.         Behavior: Behavior normal.         Laboratory:  Recent Labs     01/23/24  1347   WBC 10.6   RBC 4.85   HEMOGLOBIN 15.1   HEMATOCRIT 44.3   MCV 91.3   MCH 31.1   MCHC 34.1   RDW 41.1   PLATELETCT 221   MPV 10.5     Recent Labs     01/23/24  1347   SODIUM 137   POTASSIUM 3.9   CHLORIDE 104   CO2 21   GLUCOSE 90   BUN 13   CREATININE 0.57   CALCIUM 8.7     Recent Labs     01/23/24  1347   ALTSGPT 31   ASTSGOT 24   ALKPHOSPHAT 68   TBILIRUBIN 0.2   GLUCOSE 90         No results for input(s): \"NTPROBNP\" in the last 72 hours.      Recent Labs     01/23/24  1347   TROPONINT <6       Imaging:  DX-CHEST-PORTABLE (1 VIEW)   Final Result         No acute cardiac or pulmonary abnormality is identified.      NM-CARDIAC STRESS TEST    (Results Pending)            Assessment/Plan:  Justification for Admission Status  I anticipate this patient is appropriate for observation status at this time because workup of chest pain    Patient will need a Telemetry bed on CARDIOLOGY service .  The need is secondary to chest pain.    * Chest pain- (present on admission)  Assessment & Plan  The ASCVD Risk score (Cristian LOPEZ, et al., 2019) failed to calculate for the following reasons:    The patient has a prior MI or stroke diagnosis    Given her multiple risk cardiovascular risk factors patient will be admitted and monitored on telemetry will continue aspirin reinforce compliance  Continue Crestor  Check lipid panel  Check serial troponin if they remain negative we will set her up for nuclear medicine exercise stress test  I reviewed the patient's initial troponin which was normal   I reviewed CMP and CBC  I reviewed her chest x-ray which is negative for acute pathology  I reviewed her EKG which is negative for acute ischemic changes      CVA (cerebral vascular accident) (HCC)- (present on admission)  Assessment & Plan  History of    Continue aspirin and statin    Nicotine dependence- (present on " admission)  Assessment & Plan   patient counseled on smoking cessation she agrees to try nicotine replacement therapy  Total time spent counseling patient on smoking cessation was 5 minutes    Hypercholesterolemia- (present on admission)  Assessment & Plan   continue Crestor and check lipid panel        VTE prophylaxis: enoxaparin ppx

## 2024-01-24 NOTE — DISCHARGE SUMMARY
Discharge Summary    CHIEF COMPLAINT ON ADMISSION  Chief Complaint   Patient presents with    Arm Pain     Patient complains of left arm pain with a short episode or jaw and back pain that started at 1000 this morning. Patient reports nausea. No SOB.       Reason for Admission  Left Arm Pain     Admission Date  1/23/2024    CODE STATUS  Full Code    HPI & HOSPITAL COURSE  This is a 48 y.o. female here with who presented 1/23/2024 with history of stroke and PFO s/p closure presented to the emergency department for evaluation of chest pain.  Reports that earlier today he started to develop chest pain in her left upper chest radiating to her left arm jaw and back associated with diaphoresis and dyspnea and lasting about 10 minutes.  She had associated nausea.  No vomiting no hematemesis.  She felt lightheaded but no loss of consciousness.  She presented to the emergency department for further evaluation.  Initial EKG on admission was normal sinus rhythm, the patients serial troponin's were negative, and CXR revealed no cardiac or pulmonary abnormalities. Given the patients various risk factors she was admitted to observation for a treadmill stress test. The patients stress test during admission was negative. Given her negative cardiac workup there is a low suspicion that her chest and arm pain were cardiac in nature.   Her symptoms that prompted her visit to the ER have since resolved and have not been reproduced since admission. The patient was educated at bedside about changes in dietary habits and smoking cessation. She was prescribed Wellbutrin, nicorette gum, and nicotine patches. She was instructed to follow-up with her PCP in the next 3-5 days to further discuss smoking cessation and her ongoing dyslipidemia.   Therefore, she is discharged in good and stable condition to home with close outpatient follow-up.        Discharge Date  1/24/2024    FOLLOW UP ITEMS POST DISCHARGE  Follow up with PCP in 3-5 days  To  discuss smoking cessation, medications for dyslipidemia    DISCHARGE DIAGNOSES  Principal Problem (Resolved):    Chest pain (POA: Yes)  Active Problems:    Hypercholesterolemia (POA: Yes)    Nicotine dependence (POA: Yes)    History of embolic stroke (POA: Yes)      FOLLOW UP  No future appointments.  Kelvin Pearson M.D.  [id number]  [id number]  [place] [id number]  [phone]    Follow up in 3 day(s)        MEDICATIONS ON DISCHARGE     Medication List        START taking these medications        Instructions   buPROPion 150 MG XL tablet  Start taking on: January 24, 2024  Commonly known as: Wellbutrin XL   Take 1 Tablet by mouth every morning for 3 days, THEN 1 Tablet 2 times a day for 27 days.     nicotine 21 MG/24HR Pt24  Commonly known as: Nicoderm   Place 1 Patch on the skin every 24 hours.  Dose: 1 Patch     nicotine polacrilex 2 MG Gum  Commonly known as: Nicorette   Take 1 Each by mouth as needed for Smoking Cessation.  Dose: 2 mg            CONTINUE taking these medications        Instructions   ascorbic acid 500 MG tablet  Commonly known as: Vitamin C   Take 500 mg by mouth every day.  Dose: 500 mg     aspirin 81 MG EC tablet   Take 81 mg by mouth every day.  Dose: 81 mg     Calcium-Magnesium-Zinc 333..33-5 MG Tabs   Take 1 Tablet by mouth every day.  Dose: 1 Tablet     coenzyme Q-10 30 MG capsule   Take 30 mg by mouth every day.  Dose: 30 mg     ELDERBERRY PO   Take 2 Each by mouth every day.  Dose: 2 Each     fish oil 1000 MG Caps capsule   Take 1,000 mg by mouth every day.  Dose: 1,000 mg     MAGNESIUM GLUCONATE PO   Take 1 Tablet by mouth every day.  Dose: 1 Tablet     rosuvastatin 20 MG Tabs  Commonly known as: Crestor   Take 20 mg by mouth every evening.  Dose: 20 mg     vitamin D3 1000 Unit (25 mcg) Tabs  Commonly known as: Cholecalciferol   Take 1,000 Units by mouth every day.  Dose: 1,000 Units     zinc gluconate 50 MG Tabs tablet   Take 50 mg by mouth every day.  Dose: 50 mg         "      Allergies  Allergies   Allergen Reactions    Bee Anaphylaxis    Augmentin Rash     Pt states \"I get a bad rash\".      Latex Rash     Pt states \"I get a bad rash\".    Neosporin Lip Health Hives     Blisters and swelling at site    Other Misc Rash     Nail acrylic- Pt states \"I get a bad rash\".    Sulfa Drugs Hives, Itching and Swelling    Tape Rash     Pt states \"I get a bad rash\".  Paper tape ok       DIET  Orders Placed This Encounter   Procedures    Diet Order Diet: Regular     Standing Status:   Standing     Number of Occurrences:   1     Order Specific Question:   Diet:     Answer:   Regular [1]       ACTIVITY  As tolerated.  Weight bearing as tolerated    CONSULTATIONS  None    PROCEDURES  None    LABORATORY  Lab Results   Component Value Date    SODIUM 137 01/23/2024    POTASSIUM 3.9 01/23/2024    CHLORIDE 104 01/23/2024    CO2 21 01/23/2024    GLUCOSE 90 01/23/2024    BUN 13 01/23/2024    CREATININE 0.57 01/23/2024        Lab Results   Component Value Date    WBC 10.6 01/23/2024    HEMOGLOBIN 15.1 01/23/2024    HEMATOCRIT 44.3 01/23/2024    PLATELETCT 221 01/23/2024        Total time of the discharge process 18 minutes.  "

## 2024-06-23 ENCOUNTER — OFFICE VISIT (OUTPATIENT)
Dept: URGENT CARE | Facility: PHYSICIAN GROUP | Age: 49
End: 2024-06-23
Payer: COMMERCIAL

## 2024-06-23 VITALS
BODY MASS INDEX: 43.41 KG/M2 | TEMPERATURE: 97.9 F | OXYGEN SATURATION: 100 % | WEIGHT: 245 LBS | HEART RATE: 93 BPM | HEIGHT: 63 IN | DIASTOLIC BLOOD PRESSURE: 64 MMHG | RESPIRATION RATE: 20 BRPM | SYSTOLIC BLOOD PRESSURE: 138 MMHG

## 2024-06-23 DIAGNOSIS — U07.1 COVID-19: ICD-10-CM

## 2024-06-23 LAB
FLUAV RNA SPEC QL NAA+PROBE: NEGATIVE
FLUBV RNA SPEC QL NAA+PROBE: NEGATIVE
RSV RNA SPEC QL NAA+PROBE: NEGATIVE
SARS-COV-2 RNA RESP QL NAA+PROBE: POSITIVE

## 2024-06-23 PROCEDURE — 99214 OFFICE O/P EST MOD 30 MIN: CPT

## 2024-06-23 PROCEDURE — 3075F SYST BP GE 130 - 139MM HG: CPT

## 2024-06-23 PROCEDURE — 0241U POCT CEPHEID COV-2, FLU A/B, RSV - PCR: CPT

## 2024-06-23 PROCEDURE — 3078F DIAST BP <80 MM HG: CPT

## 2024-06-23 RX ORDER — POTASSIUM CHLORIDE 600 MG/1
TABLET, FILM COATED, EXTENDED RELEASE ORAL
COMMUNITY
Start: 2024-06-22

## 2024-06-23 ASSESSMENT — FIBROSIS 4 INDEX: FIB4 SCORE: 0.96

## 2024-06-23 ASSESSMENT — ENCOUNTER SYMPTOMS
MYALGIAS: 0
SINUS PAIN: 1
COUGH: 1
CHILLS: 1
FEVER: 0

## 2024-06-23 NOTE — PROGRESS NOTES
"Subjective:     CHIEF COMPLAINT  Chief Complaint   Patient presents with    Pharyngitis     Headache,chills,tightness in chest,headache,x3 days       HPI  Bria Martinez is a very pleasant 48 y.o. female who presents after having a faint positive home antigen test for COVID.  She has also been experiencing right ear discomfort, a sore throat, right-sided maxillary sinus pain, sneezing, chills, and cough for 3 days.  Her  was seen in the urgent care several days ago and tested positive for COVID.  She is not interested in taking Paxlovid at this time.  She has not had any measured fevers.  She has not taken any Tylenol or ibuprofen today.  She has expressed concern for concurrent sinus infection.    REVIEW OF SYSTEMS  Review of Systems   Constitutional:  Positive for chills and malaise/fatigue (Mild). Negative for fever.   HENT:  Positive for congestion, ear pain (R ear) and sinus pain (R maxillary).    Respiratory:  Positive for cough.    Musculoskeletal:  Negative for myalgias.       PAST MEDICAL HISTORY  Patient Active Problem List    Diagnosis Date Noted    PFO (patent foramen ovale) 05/30/2018    History of embolic stroke 05/29/2018    Headache 05/29/2018    Vertigo 05/28/2018    Lactic acidosis 05/28/2018    Leukocytosis 05/28/2018    Abnormal uterine bleeding 08/23/2017    Ovarian cyst 01/03/2017    Nicotine dependence     CHEST PAIN 08/08/2012    Hypercholesterolemia 08/08/2012    Acid reflux disease 08/08/2012    Migraine headache 08/08/2012       SURGICAL HISTORY   has a past surgical history that includes tonsillectomy; cholecystectomy (2009); appendectomy; breast biopsy (Left); vaginal hysterectomy scope total (8/23/2017); and salpingectomy (Bilateral, 8/23/2017).    ALLERGIES  Allergies   Allergen Reactions    Bee Anaphylaxis    Bee Venom Anaphylaxis    Augmentin Rash     Pt states \"I get a bad rash\".      Latex Rash     Pt states \"I get a bad rash\".    Neosporin Lip Health Hives     " "Blisters and swelling at site    Other Misc Rash     Nail acrylic- Pt states \"I get a bad rash\".    Sulfa Drugs Hives, Itching and Swelling    Tape Rash     Pt states \"I get a bad rash\".  Paper tape ok    Amoxicillin-Pot Clavulanate Rash and Unspecified     Pt states \"I get a bad rash\".       CURRENT MEDICATIONS  Home Medications       Reviewed by Sharon Hooks P.A.-C. (Physician Assistant) on 24 at 0942  Med List Status: <None>     Medication Last Dose Status   ascorbic acid (VITAMIN C) 500 MG tablet Taking Active   aspirin 81 MG EC tablet Taking Active   Calcium-Magnesium-Zinc 333..33-5 MG Tab Taking Active   coenzyme Q-10 30 MG capsule Taking Active   ELDERBERRY PO Taking Active   MAGNESIUM GLUCONATE PO Taking Active   nicotine (NICODERM) 21 MG/24HR PATCH 24 HR Taking Active   nicotine polacrilex (NICORETTE) 2 MG Gum Taking Active   Omega-3 Fatty Acids (FISH OIL) 1000 MG Cap capsule Taking Active   potassium chloride (KLOR-CON) 8 MEQ tablet Taking Active   rosuvastatin (CRESTOR) 20 MG Tab Taking Active   vitamin D3 (CHOLECALCIFEROL) 1000 Unit (25 mcg) Tab Taking Active   zinc gluconate 50 MG Tab tablet Taking Active                    SOCIAL HISTORY  Social History     Tobacco Use    Smoking status: Every Day     Current packs/day: 0.00     Average packs/day: 1 pack/day for 20.0 years (20.0 ttl pk-yrs)     Types: Cigarettes     Start date: 1998     Last attempt to quit: 2018     Years since quittin.0    Smokeless tobacco: Never   Vaping Use    Vaping status: Never Used   Substance and Sexual Activity    Alcohol use: Yes     Comment: RARELY    Drug use: No    Sexual activity: Not on file       FAMILY HISTORY  History reviewed. No pertinent family history.       Objective:     VITAL SIGNS: /64 (BP Location: Right arm, Patient Position: Sitting, BP Cuff Size: Large adult long)   Pulse 93   Temp 36.6 °C (97.9 °F) (Temporal)   Resp 20   Ht 1.6 m (5' 3\")   Wt 111 kg (245 lb)  "  SpO2 100%   BMI 43.40 kg/m²     PHYSICAL EXAM  Physical Exam  Vitals reviewed.   Constitutional:       General: She is not in acute distress.     Appearance: Normal appearance. She is ill-appearing. She is not toxic-appearing or diaphoretic.   HENT:      Head: Normocephalic and atraumatic.      Right Ear: Ear canal and external ear normal. A middle ear effusion is present. Tympanic membrane is not erythematous or bulging.      Left Ear: Tympanic membrane, ear canal and external ear normal.      Nose: Congestion present.      Mouth/Throat:      Mouth: Mucous membranes are moist.      Pharynx: Posterior oropharyngeal erythema present. No oropharyngeal exudate.      Comments: Uvula midline.  Tonsils previously removed.  Mild pharyngeal swelling present.  Eyes:      Conjunctiva/sclera: Conjunctivae normal.   Cardiovascular:      Rate and Rhythm: Normal rate and regular rhythm.      Heart sounds: Normal heart sounds.   Pulmonary:      Effort: Pulmonary effort is normal. No respiratory distress.      Breath sounds: Normal breath sounds. No stridor. No wheezing, rhonchi or rales.   Skin:     General: Skin is warm and dry.      Coloration: Skin is not pale.   Neurological:      General: No focal deficit present.      Mental Status: She is alert.   Psychiatric:         Mood and Affect: Mood normal.         Assessment/Plan:     1. COVID-19  - POCT CoV-2, Flu A/B, RSV by PCR    Other orders  - potassium chloride (KLOR-CON) 8 MEQ tablet  -Rest and hydrate  -Tylenol over-the-counter as needed for discomfort  -Follow CDC isolation guidelines  -Return to clinic/ER if symptoms worsen or fail to resolve  -Flonase nasal spray over-the-counter  -Zyrtec over-the-counter  -Sinus flushes using distilled water and saline    MDM/Comments:  I have prepared for this visit by personally reviewing the patient's prevous medical records, vitals, and labs including: most recent GFR and CMP. Patient has stable vital signs and is non-toxic  appearing.  Viral testing for COVID, influenza, and RSV performed in office with positive COVID results.  Discussed with patient that she may have concurrent sinusitis, but it is likely viral and does not require antibiotics at this time.  Patient has been offered Paxlovid and has politely declined.  Discussed supportive care with hydration, rest, Tylenol as needed. Patient demonstrated understanding of treatment plan at this time and will RTC if symptoms worsen or fail to resolve.       Differential diagnosis, natural history, supportive care, and indications for immediate follow-up discussed. All questions answered. Patient agrees with the plan of care.    Follow-up as needed if symptoms worsen or fail to improve to PCP, Urgent care or Emergency Room.    I have personally reviewed prior external notes and test results pertinent to today's visit.  I have independently reviewed and interpreted all diagnostics ordered during this urgent care acute visit.   Discussed management options (risks,benefits, and alternatives to treatment). Pt expresses understanding and the treatment plan was agreed upon. Questions were encouraged and answered to pt's satisfaction.    Please note that this dictation was created using voice recognition software. I have made a reasonable attempt to correct obvious errors, but I expect that there are errors of grammar and possibly content that I did not discover before finalizing the note.

## 2024-08-03 ENCOUNTER — OFFICE VISIT (OUTPATIENT)
Dept: URGENT CARE | Facility: PHYSICIAN GROUP | Age: 49
End: 2024-08-03
Payer: COMMERCIAL

## 2024-08-03 VITALS
WEIGHT: 239 LBS | HEIGHT: 63 IN | DIASTOLIC BLOOD PRESSURE: 76 MMHG | OXYGEN SATURATION: 95 % | RESPIRATION RATE: 16 BRPM | BODY MASS INDEX: 42.35 KG/M2 | HEART RATE: 90 BPM | SYSTOLIC BLOOD PRESSURE: 132 MMHG | TEMPERATURE: 98.4 F

## 2024-08-03 DIAGNOSIS — A08.4 VIRAL GASTROENTERITIS: ICD-10-CM

## 2024-08-03 PROCEDURE — 3078F DIAST BP <80 MM HG: CPT

## 2024-08-03 PROCEDURE — 99213 OFFICE O/P EST LOW 20 MIN: CPT

## 2024-08-03 PROCEDURE — 3075F SYST BP GE 130 - 139MM HG: CPT

## 2024-08-03 RX ORDER — HYDROCODONE BITARTRATE AND ACETAMINOPHEN 5; 325 MG/1; MG/1
1 TABLET ORAL EVERY 6 HOURS PRN
COMMUNITY
Start: 2024-07-24 | End: 2024-08-04

## 2024-08-03 RX ORDER — ONDANSETRON 4 MG/1
4 TABLET, ORALLY DISINTEGRATING ORAL EVERY 6 HOURS PRN
Qty: 20 TABLET | Refills: 0 | Status: SHIPPED | OUTPATIENT
Start: 2024-08-03

## 2024-08-03 RX ORDER — CLINDAMYCIN HCL 150 MG
CAPSULE ORAL
COMMUNITY
Start: 2024-07-23

## 2024-08-03 ASSESSMENT — ENCOUNTER SYMPTOMS
NAUSEA: 1
MYALGIAS: 1
VOMITING: 1
ABDOMINAL PAIN: 1
DIARRHEA: 1
FEVER: 0
CHILLS: 1

## 2024-08-03 ASSESSMENT — FIBROSIS 4 INDEX: FIB4 SCORE: 0.96

## 2024-08-03 NOTE — LETTER
August 3, 2024    To Whom It May Concern:         This is confirmation that Bria Martinez attended her scheduled appointment with Sharon Hooks P.A.-C. on 8/03/24. She is unable to attend her concert tonight secondary to medical reasons.          If you have any questions please do not hesitate to call me at the phone number listed below.    Sincerely,          Sharon Hooks P.A.-C.  [phone]

## 2024-08-03 NOTE — LETTER
August 3, 2024    To Whom It May Concern:         This is confirmation that Bria Ann Martinez attended her scheduled appointment with Sharon Hooks P.A.-C. on 8/03/24. Please excuse her absence from work Monday.          If you have any questions please do not hesitate to call me at the phone number listed below.    Sincerely,          Sharon Hooks P.A.-C.  [phone]

## 2024-08-03 NOTE — PROGRESS NOTES
"Subjective:     CHIEF COMPLAINT  Chief Complaint   Patient presents with    Emesis     Diarrhea,fatigue,chills,bodyaches x 1 day        HPI  Bria Martinez is a very pleasant 48 y.o. female who presents accompanied by her  with nausea, vomiting, and diarrhea that started last night in the middle of the night.  She has been experiencing chills, body aches, and sweats.  She has been experiencing abdominal cramping.  She has not had any vomiting or diarrhea so far today.  She has been urinating normally.  Her coworkers have been sick with similar symptoms.    REVIEW OF SYSTEMS  Review of Systems   Constitutional:  Positive for chills and malaise/fatigue. Negative for fever.   HENT:  Positive for ear pain.    Gastrointestinal:  Positive for abdominal pain, diarrhea, nausea and vomiting.   Musculoskeletal:  Positive for myalgias.       PAST MEDICAL HISTORY  Patient Active Problem List    Diagnosis Date Noted    PFO (patent foramen ovale) 05/30/2018    History of embolic stroke 05/29/2018    Headache 05/29/2018    Vertigo 05/28/2018    Lactic acidosis 05/28/2018    Leukocytosis 05/28/2018    Abnormal uterine bleeding 08/23/2017    Ovarian cyst 01/03/2017    Nicotine dependence     CHEST PAIN 08/08/2012    Hypercholesterolemia 08/08/2012    Acid reflux disease 08/08/2012    Migraine headache 08/08/2012       SURGICAL HISTORY   has a past surgical history that includes tonsillectomy; cholecystectomy (2009); appendectomy; breast biopsy (Left); vaginal hysterectomy scope total (8/23/2017); and salpingectomy (Bilateral, 8/23/2017).    ALLERGIES  Allergies   Allergen Reactions    Bee Anaphylaxis    Bee Venom Anaphylaxis    Augmentin Rash     Pt states \"I get a bad rash\".      Latex Rash     Pt states \"I get a bad rash\".    Neosporin Lip Health Hives     Blisters and swelling at site    Other Misc Rash     Nail acrylic- Pt states \"I get a bad rash\".    Sulfa Drugs Hives, Itching and Swelling    Tape Rash     Pt " "states \"I get a bad rash\".  Paper tape ok    Amoxicillin-Pot Clavulanate Rash and Unspecified     Pt states \"I get a bad rash\".       CURRENT MEDICATIONS  Home Medications       Reviewed by Sharon Hooks P.A.-C. (Physician Assistant) on 24 at 1606  Med List Status: <None>     Medication Last Dose Status   ascorbic acid (VITAMIN C) 500 MG tablet Taking Active   aspirin 81 MG EC tablet Taking Active   Calcium-Magnesium-Zinc 333..33-5 MG Tab Taking Active   clindamycin (CLEOCIN) 150 MG Cap Not Taking Active   coenzyme Q-10 30 MG capsule Not Taking Active   ELDERBERRY PO Not Taking Active   HYDROcodone-acetaminophen (NORCO) 5-325 MG Tab per tablet PRN Active   MAGNESIUM GLUCONATE PO Taking Active   nicotine (NICODERM) 21 MG/24HR PATCH 24 HR PRN Active   nicotine polacrilex (NICORETTE) 2 MG Gum Not Taking Active   Omega-3 Fatty Acids (FISH OIL) 1000 MG Cap capsule Taking Active   potassium chloride (KLOR-CON) 8 MEQ tablet Taking Active   rosuvastatin (CRESTOR) 20 MG Tab Taking Active   vitamin D3 (CHOLECALCIFEROL) 1000 Unit (25 mcg) Tab Taking Active   zinc gluconate 50 MG Tab tablet Taking Active                    SOCIAL HISTORY  Social History     Tobacco Use    Smoking status: Every Day     Current packs/day: 0.00     Average packs/day: 1 pack/day for 20.0 years (20.0 ttl pk-yrs)     Types: Cigarettes     Start date: 1998     Last attempt to quit: 2018     Years since quittin.1    Smokeless tobacco: Never   Vaping Use    Vaping status: Never Used   Substance and Sexual Activity    Alcohol use: Yes     Comment: RARELY    Drug use: No    Sexual activity: Not on file       FAMILY HISTORY  History reviewed. No pertinent family history.       Objective:     VITAL SIGNS: /76 (BP Location: Right arm, Patient Position: Sitting, BP Cuff Size: Adult)   Pulse 90   Temp 36.9 °C (98.4 °F) (Temporal)   Resp 16   Ht 1.6 m (5' 3\")   Wt 108 kg (239 lb)   SpO2 95%   BMI 42.34 kg/m² "     PHYSICAL EXAM  Physical Exam  Vitals reviewed. Exam conducted with a chaperone present.   Constitutional:       General: She is not in acute distress.     Appearance: Normal appearance. She is ill-appearing. She is not toxic-appearing or diaphoretic.   HENT:      Head: Normocephalic and atraumatic.      Right Ear: Tympanic membrane, ear canal and external ear normal.      Left Ear: Tympanic membrane, ear canal and external ear normal.      Nose: Nose normal.      Mouth/Throat:      Mouth: Mucous membranes are moist.      Pharynx: Posterior oropharyngeal erythema present. No oropharyngeal exudate.   Eyes:      Conjunctiva/sclera: Conjunctivae normal.   Cardiovascular:      Rate and Rhythm: Normal rate and regular rhythm.      Heart sounds: Normal heart sounds.   Pulmonary:      Effort: Pulmonary effort is normal. No respiratory distress.      Breath sounds: Normal breath sounds. No stridor. No wheezing, rhonchi or rales.   Abdominal:      General: Abdomen is flat. Bowel sounds are normal. There is no distension.      Palpations: Abdomen is soft. There is no mass.      Tenderness: There is generalized abdominal tenderness (Generalized abdominal tenderness, mild). There is no guarding or rebound. Negative signs include Merino's sign and McBurney's sign.      Hernia: No hernia is present.      Comments: Mild   Skin:     General: Skin is warm and dry.      Coloration: Skin is not jaundiced or pale.   Neurological:      General: No focal deficit present.      Mental Status: She is alert.   Psychiatric:         Mood and Affect: Mood normal.         Assessment/Plan:     1. Viral gastroenteritis  - ondansetron (ZOFRAN ODT) 4 MG TABLET DISPERSIBLE; Take 1 Tablet by mouth every 6 hours as needed for Nausea/Vomiting.  Dispense: 20 Tablet; Refill: 0    Other orders  - clindamycin (CLEOCIN) 150 MG Cap; TAKE 4 CAPSULES BY MOUTH 1 HOUR PRIOR TO APPT. FOR PREMEDICATION (Patient not taking: Reported on 8/3/2024)  -  HYDROcodone-acetaminophen (NORCO) 5-325 MG Tab per tablet; Take 1 Tablet by mouth every 6 hours as needed.  -Hydrate with frequent small sips of fluids.  May drink coconut water, Gatorade, or Pedialyte  -[place] diet with gradual return to normal diet as tolerated  -Tylenol over-the-counter for body aches  -Monitor symptoms closely and return to clinic/ER if symptoms worsen or fail to resolve    MDM/Comments:  I have prepared for this visit by personally reviewing the patient's prevous medical records, vitals, and labs including: most recent GFR and CMP. Patient has stable vital signs and is non-toxic appearing.  Patient is experiencing viral gastroenteritis likely secondary to exposure at work.  Patient provided Zofran for symptomatic relief of nausea.  Patient's physical exam was overall within normal limits with mild abdominal tenderness.  No evidence of an acute abdomen at this time.  Discussed supportive care with hydration, rest, Tylenol/Ibuprofen as needed. Patient demonstrated understanding of treatment plan at this time and will RTC if symptoms worsen or fail to resolve.         Differential diagnosis, natural history, supportive care, and indications for immediate follow-up discussed. All questions answered. Patient agrees with the plan of care.    Follow-up as needed if symptoms worsen or fail to improve to PCP, Urgent care or Emergency Room.    I have personally reviewed prior external notes and test results pertinent to today's visit.  I have independently reviewed and interpreted all diagnostics ordered during this urgent care acute visit.   Discussed management options (risks,benefits, and alternatives to treatment). Pt expresses understanding and the treatment plan was agreed upon. Questions were encouraged and answered to pt's satisfaction.    Please note that this dictation was created using voice recognition software. I have made a reasonable attempt to correct obvious errors, but I expect that  there are errors of grammar and possibly content that I did not discover before finalizing the note.

## 2024-08-04 ENCOUNTER — OFFICE VISIT (OUTPATIENT)
Dept: URGENT CARE | Facility: PHYSICIAN GROUP | Age: 49
End: 2024-08-04
Payer: COMMERCIAL

## 2024-08-04 ENCOUNTER — APPOINTMENT (OUTPATIENT)
Dept: RADIOLOGY | Facility: IMAGING CENTER | Age: 49
End: 2024-08-04
Payer: COMMERCIAL

## 2024-08-04 VITALS
BODY MASS INDEX: 41.39 KG/M2 | OXYGEN SATURATION: 95 % | WEIGHT: 233.6 LBS | DIASTOLIC BLOOD PRESSURE: 68 MMHG | TEMPERATURE: 96.9 F | HEIGHT: 63 IN | HEART RATE: 104 BPM | RESPIRATION RATE: 16 BRPM | SYSTOLIC BLOOD PRESSURE: 94 MMHG

## 2024-08-04 DIAGNOSIS — R07.81 RIB PAIN: ICD-10-CM

## 2024-08-04 DIAGNOSIS — K12.1 DENTURE STOMATITIS: ICD-10-CM

## 2024-08-04 DIAGNOSIS — A08.4 VIRAL GASTROENTERITIS: ICD-10-CM

## 2024-08-04 LAB
APPEARANCE UR: NORMAL
BILIRUB UR STRIP-MCNC: NEGATIVE MG/DL
COLOR UR AUTO: YELLOW
GLUCOSE UR STRIP.AUTO-MCNC: NEGATIVE MG/DL
KETONES UR STRIP.AUTO-MCNC: NEGATIVE MG/DL
LEUKOCYTE ESTERASE UR QL STRIP.AUTO: NEGATIVE
NITRITE UR QL STRIP.AUTO: NEGATIVE
PH UR STRIP.AUTO: 6.5 [PH] (ref 5–8)
PROT UR QL STRIP: NEGATIVE MG/DL
RBC UR QL AUTO: NEGATIVE
SP GR UR STRIP.AUTO: 1.01
UROBILINOGEN UR STRIP-MCNC: 0.2 MG/DL

## 2024-08-04 PROCEDURE — 3074F SYST BP LT 130 MM HG: CPT

## 2024-08-04 PROCEDURE — 99213 OFFICE O/P EST LOW 20 MIN: CPT

## 2024-08-04 PROCEDURE — 71046 X-RAY EXAM CHEST 2 VIEWS: CPT | Mod: TC | Performed by: RADIOLOGY

## 2024-08-04 PROCEDURE — 81002 URINALYSIS NONAUTO W/O SCOPE: CPT

## 2024-08-04 PROCEDURE — 3078F DIAST BP <80 MM HG: CPT

## 2024-08-04 RX ORDER — ASPIRIN 81 MG/1
1 TABLET ORAL
COMMUNITY
End: 2024-08-04

## 2024-08-04 RX ORDER — ROSUVASTATIN CALCIUM 10 MG/1
10 TABLET, COATED ORAL
COMMUNITY
Start: 2024-07-09

## 2024-08-04 RX ORDER — CLOTRIMAZOLE 10 MG/1
10 LOZENGE ORAL
Qty: 35 TROCHE | Refills: 0 | Status: SHIPPED | OUTPATIENT
Start: 2024-08-04 | End: 2024-08-11

## 2024-08-04 ASSESSMENT — ENCOUNTER SYMPTOMS
FEVER: 0
DIARRHEA: 1
ABDOMINAL PAIN: 0
HEARTBURN: 0
VOMITING: 0
NAUSEA: 1
CHILLS: 1
CONSTIPATION: 0
MYALGIAS: 1
BLOOD IN STOOL: 0

## 2024-08-04 ASSESSMENT — FIBROSIS 4 INDEX: FIB4 SCORE: 0.96

## 2024-08-04 NOTE — PROGRESS NOTES
Verbal consent was acquired by the patient to use [facility] ambient listening note generation during this visit   Subjective:   Bria Martinez is a 48 y.o. female who presents for Rib Pain (Difficulty breathing, red blotches/sores in mouth, x2 days )      HPI:  History of Present Illness  The patient presents for evaluation of multiple medical concerns. She is accompanied by her .    The patient was diagnosed with viral gastroenteritis yesterday and was prescribed anti-nausea medication. She reports experiencing rib pain since Friday morning, which has progressively worsened today, accompanied by difficulty breathing. Despite attempts to alleviate the pain by coughing, the pain intensifies. She denies experiencing dysuria or urinary frequency. She denies fevers.  She experienced diarrhea once yesterday and twice this morning. She denies any hematochezia. Her food intake has been limited, with the exception of chicken broth, which she tolerates well. She denies any nasal congestion. She also reported throat and ear pain yesterday. Upon removing her dentures, she noticed red blotches and two white and pus-filled spots. She maintains regular maintenance for dental cleanings.        Review of Systems   Constitutional:  Positive for chills. Negative for fever.   HENT:          + Oral pain   Gastrointestinal:  Positive for diarrhea and nausea. Negative for abdominal pain, blood in stool, constipation, heartburn, melena and vomiting.   Genitourinary: Negative.    Musculoskeletal:  Positive for myalgias.        + Rib pain       Medications:    Current Outpatient Medications on File Prior to Visit   Medication Sig Dispense Refill    rosuvastatin (CRESTOR) 10 MG Tab Take 10 mg by mouth at bedtime.      potassium chloride (KLOR-CON) 8 MEQ tablet       nicotine (NICODERM) 21 MG/24HR PATCH 24 HR Place 1 Patch on the skin every 24 hours. 30 Patch 2    nicotine polacrilex (NICORETTE) 2 MG Gum Take 1 Each by mouth as  needed for Smoking Cessation. 100 Each 2    aspirin 81 MG EC tablet Take 81 mg by mouth every day.      rosuvastatin (CRESTOR) 20 MG Tab Take 20 mg by mouth every evening.      vitamin D3 (CHOLECALCIFEROL) 1000 Unit (25 mcg) Tab Take 1,000 Units by mouth every day.      ascorbic acid (VITAMIN C) 500 MG tablet Take 500 mg by mouth every day.      coenzyme Q-10 30 MG capsule Take 30 mg by mouth every day.      zinc gluconate 50 MG Tab tablet Take 50 mg by mouth every day.      ELDERBERRY PO Take 2 Each by mouth every day.      Calcium-Magnesium-Zinc 333..33-5 MG Tab Take 1 Tablet by mouth every day.      MAGNESIUM GLUCONATE PO Take 1 Tablet by mouth every day.      Omega-3 Fatty Acids (FISH OIL) 1000 MG Cap capsule Take 1,000 mg by mouth every day.      clindamycin (CLEOCIN) 150 MG Cap  (Patient not taking: Reported on 8/4/2024)      ondansetron (ZOFRAN ODT) 4 MG TABLET DISPERSIBLE Take 1 Tablet by mouth every 6 hours as needed for Nausea/Vomiting. (Patient not taking: Reported on 8/4/2024) 20 Tablet 0     No current facility-administered medications on file prior to visit.        Allergies:   Bee, Bee venom, Augmentin, Latex, Neosporin lip health, Other misc, Sulfa drugs, Tape, and Amoxicillin-pot clavulanate    Problem List:   Patient Active Problem List   Diagnosis    CHEST PAIN    Hypercholesterolemia    Acid reflux disease    Migraine headache    Ovarian cyst    Nicotine dependence    Abnormal uterine bleeding    Vertigo    Lactic acidosis    Leukocytosis    History of embolic stroke    Headache    PFO (patent foramen ovale)        Surgical History:  Past Surgical History:   Procedure Laterality Date    VAGINAL HYSTERECTOMY SCOPE TOTAL  8/23/2017    Procedure: VAGINAL HYSTERECTOMY SCOPE TOTAL ;  Surgeon: Davin Liriano M.D.;  Location: SURGERY [facility];  Service:     SALPINGECTOMY Bilateral 8/23/2017    Procedure: SALPINGECTOMY;  Surgeon: Davin Liriano M.D.;  Location: [facility];   "Service:     CHOLECYSTECTOMY  2009    APPENDECTOMY      BREAST BIOPSY Left     TONSILLECTOMY         Past Social Hx:   Social History     Tobacco Use    Smoking status: Every Day     Current packs/day: 0.00     Average packs/day: 1 pack/day for 20.0 years (20.0 ttl pk-yrs)     Types: Cigarettes     Start date: 1998     Last attempt to quit: 2018     Years since quittin.1    Smokeless tobacco: Never   Vaping Use    Vaping status: Never Used   Substance Use Topics    Alcohol use: Yes     Comment: RARELY    Drug use: No          Problem list, medications, and allergies reviewed by myself today in Epic.     Objective:     BP 94/68 (BP Location: Right arm, Patient Position: Sitting, BP Cuff Size: Adult)   Pulse (!) 104   Temp 36.1 °C (96.9 °F) (Temporal)   Resp 16   Ht 1.6 m (5' 3\")   Wt 106 kg (233 lb 9.6 oz)   SpO2 95%   BMI 41.38 kg/m²     Physical Exam  Vitals and nursing note reviewed.   Constitutional:       General: She is not in acute distress.     Appearance: Normal appearance. She is normal weight. She is not ill-appearing, toxic-appearing or diaphoretic.   HENT:      Head: Normocephalic and atraumatic.      Right Ear: Tympanic membrane, ear canal and external ear normal. There is no impacted cerumen.      Left Ear: Tympanic membrane, ear canal and external ear normal. There is no impacted cerumen.      Nose: Nose normal. No congestion or rhinorrhea.      Mouth/Throat:      Mouth: Mucous membranes are moist. Oral lesions present.      Pharynx: Oropharynx is clear. No oropharyngeal exudate or posterior oropharyngeal erythema.     Cardiovascular:      Rate and Rhythm: Normal rate and regular rhythm.      Pulses: Normal pulses.      Heart sounds: Normal heart sounds. No murmur heard.     No friction rub. No gallop.   Pulmonary:      Effort: Pulmonary effort is normal. No respiratory distress.      Breath sounds: Normal breath sounds. No stridor. No wheezing, rhonchi or rales.   Chest:      " Chest wall: No tenderness.   Abdominal:      General: Abdomen is flat. Bowel sounds are normal. There is no distension.      Palpations: Abdomen is soft. There is no mass.      Tenderness: There is abdominal tenderness. There is no right CVA tenderness, left CVA tenderness, guarding or rebound.      Hernia: No hernia is present.   Musculoskeletal:      Cervical back: Neck supple. No tenderness.   Lymphadenopathy:      Cervical: No cervical adenopathy.   Skin:     General: Skin is warm and dry.      Capillary Refill: Capillary refill takes less than 2 seconds.   Neurological:      General: No focal deficit present.      Mental Status: She is alert and oriented to person, place, and time. Mental status is at baseline.      Cranial Nerves: No cranial nerve deficit.      Motor: No weakness.      Gait: Gait normal.   Psychiatric:         Mood and Affect: Mood normal.         Behavior: Behavior normal.         Thought Content: Thought content normal.         Judgment: Judgment normal.         Assessment/Plan:     Diagnosis and associated orders:   1. Rib pain  - DX-CHEST-2 VIEWS; Future    2. Viral gastroenteritis  - POCT Urinalysis    3. Denture stomatitis  - clotrimazole (MYCELEX) 10 MG Benedicto benedicto; Take 1 Benedicto by mouth 5 Times a Day for 7 days.  Dispense: 35 Benedicto; Refill: 0    Other orders  - rosuvastatin (CRESTOR) 10 MG Tab; Take 10 mg by mouth at bedtime.    Results for orders placed or performed in visit on 08/04/24   POCT Urinalysis   Result Value Ref Range    POC Color yellow Negative    POC Appearance slightly cloudy Negative    POC Glucose negative Negative mg/dL    POC Bilirubin negative Negative mg/dL    POC Ketones negative Negative mg/dL    POC Specific Gravity 1.010 <1.005 - >1.030    POC Blood negative Negative    POC Urine PH 6.5 5.0 - 8.0    POC Protein negative Negative mg/dL    POC Urobiligen 0.2 Negative (0.2) mg/dL    POC Nitrites negative Negative    POC Leukocyte Esterase negative Negative        Comments/MDM:   Pt is clinically stable at today's acute urgent care visit.  No acute distress noted. Appropriate for outpatient management at this time.     Assessment & Plan  1. Viral gastroenteritis.  The patient's symptoms suggest a possible diagnosis of viral gastroenteritis.  A chest x-ray was completed today given the patient's complaints of pain with deep breathing chest x-ray was unremarkable.  Her lung sounds are clear on auscultation.  POC UA is unremarkable.  I have discussed with the patient that her symptoms are consistent with a viral gastroenteritis. the patient has been advised to maintain hydration and adhere to a bland BRAT diet. A work note will be provided.    2.  Dentures dermatitis  An antifungal medication will be prescribed.  Discussed maintaining good oral hygiene and dental hygiene.           Discussed DDx, management options (risks,benefits, and alternatives to planned treatment), natural progression and supportive care.  Expressed understanding and the treatment plan was agreed upon. Questions were encouraged and answered   Return to urgent care prn if new or worsening sx or if there is no improvement in condition prn.    Educated in Red flags and indications to immediately call 911 or present to the Emergency Department.   Advised the patient to follow-up with the primary care physician for recheck, reevaluation, and consideration of further management.    I personally reviewed prior external notes and test results pertinent to today's visit.  I have independently reviewed and interpreted all diagnostics ordered during this urgent care acute visit.       Please note that this dictation was created using voice recognition software. I have made a reasonable attempt to correct obvious errors, but I expect that there are errors of grammar and possibly content that I did not discover before finalizing the note.    This note was electronically signed by MANSI Nice

## 2024-08-04 NOTE — LETTER
August 4, 2024    To Whom It May Concern:         This is confirmation that Bria Martinez attended her scheduled appointment with LEILANI Linn on 8/04/24. Please excuse from work 8/5/24-8/6/24         If you have any questions please do not hesitate to call me at the phone number listed below.    Sincerely,          Miladys Mansfield A.P.R.N.  [phone]                  
normal

## 2024-09-05 ENCOUNTER — OFFICE VISIT (OUTPATIENT)
Dept: URGENT CARE | Facility: PHYSICIAN GROUP | Age: 49
End: 2024-09-05
Payer: COMMERCIAL

## 2024-09-05 VITALS
DIASTOLIC BLOOD PRESSURE: 62 MMHG | HEART RATE: 90 BPM | RESPIRATION RATE: 16 BRPM | BODY MASS INDEX: 42.7 KG/M2 | WEIGHT: 241 LBS | SYSTOLIC BLOOD PRESSURE: 104 MMHG | OXYGEN SATURATION: 98 % | HEIGHT: 63 IN | TEMPERATURE: 97.8 F

## 2024-09-05 DIAGNOSIS — T78.40XA ALLERGIC REACTION, INITIAL ENCOUNTER: ICD-10-CM

## 2024-09-05 PROCEDURE — 99213 OFFICE O/P EST LOW 20 MIN: CPT

## 2024-09-05 PROCEDURE — 3074F SYST BP LT 130 MM HG: CPT

## 2024-09-05 PROCEDURE — 3078F DIAST BP <80 MM HG: CPT

## 2024-09-05 RX ORDER — DEXAMETHASONE SODIUM PHOSPHATE 10 MG/ML
10 INJECTION INTRAMUSCULAR; INTRAVENOUS ONCE
Status: COMPLETED | OUTPATIENT
Start: 2024-09-05 | End: 2024-09-05

## 2024-09-05 RX ORDER — PREDNISONE 10 MG/1
40 TABLET ORAL DAILY
Qty: 20 TABLET | Refills: 0 | Status: SHIPPED | OUTPATIENT
Start: 2024-09-06 | End: 2024-09-11

## 2024-09-05 RX ADMIN — DEXAMETHASONE SODIUM PHOSPHATE 10 MG: 10 INJECTION INTRAMUSCULAR; INTRAVENOUS at 18:32

## 2024-09-05 ASSESSMENT — FIBROSIS 4 INDEX: FIB4 SCORE: 0.96

## 2024-09-05 NOTE — LETTER
September 5, 2024    To Whom It May Concern:         This is confirmation that Bria Martinez attended her scheduled appointment with LEILANI Randall on 9/05/24.         If you have any questions please do not hesitate to call me at the phone number listed below.    Sincerely,          CRISTOPHER Randall.  [phone]

## 2024-09-06 NOTE — PROGRESS NOTES
"Chief Complaint   Patient presents with    Allergic Reaction     Happened on Monday, swollen face,redness, itchyness          Subjective:   HISTORY OF PRESENT ILLNESS: Bria Martinez is a 48 y.o. female who presents for eye swelling, red, watery itchy eyes, splotchy red patches to her face.  She reports this is happening more frequently and she cannot pin point an allergen, she is highly allergic to bees and sulfa.  She has no SOB, wheezing, swollen tongue, drooling.    Patient denies n/v    Medications, Allergies, current problem list, Social and Family history reviewed today in Epic.     Objective:     /62 (BP Location: Right arm, Patient Position: Sitting, BP Cuff Size: Adult)   Pulse 90   Temp 36.6 °C (97.8 °F) (Temporal)   Resp 16   Ht 1.6 m (5' 3\")   Wt 109 kg (241 lb)   SpO2 98%     Physical Exam  Vitals reviewed.   Constitutional:       Appearance: Normal appearance.   HENT:      Mouth/Throat:      Mouth: Mucous membranes are moist.   Cardiovascular:      Rate and Rhythm: Normal rate.   Pulmonary:      Effort: Pulmonary effort is normal.   Skin:     General: Skin is warm and dry.      Comments: Red blotchy, flat rash to face and eyes.    Neurological:      Mental Status: She is alert and oriented to person, place, and time.   Psychiatric:         Mood and Affect: Mood normal.          Assessment/Plan:     Diagnosis and associated orders    I personally reviewed prior external notes and test results pertinent to today's visit.     1. Allergic reaction, initial encounter  dexamethasone (Decadron) injection (check route below) 10 mg    predniSONE (DELTASONE) 10 MG Tab            IMPRESSION:  Pt has stable vital signs and no red flag symptoms or exam findings identified.   Informed pt that symptoms are consistent with allergic reaction, will treat with loading dose of decadron in clinic, she will start prednisone tomorrow.  Advised to continue antihistamine x 10 days.  Should FU with PCP for " possible allergy testing    Differential diagnosis discussed. Pt was Educated on red flag symptoms. Pt has been Instructed to return to Urgent Care or nearest Emergency Department if symptoms fail to improve, for any change in condition, further concerns, or new concerning symptoms. Patient states understanding of the plan of care and discharge instructions.  They are discharged in stable condition.         Please note that this dictation was created using voice recognition software. I have made a reasonable attempt to correct obvious errors, but I expect that there are errors of grammar and possibly content that I did not discover before finalizing the note.    This note was electronically signed by LEILANI Randall

## 2025-02-15 ENCOUNTER — HOSPITAL ENCOUNTER (EMERGENCY)
Facility: MEDICAL CENTER | Age: 50
End: 2025-02-15
Attending: EMERGENCY MEDICINE
Payer: COMMERCIAL

## 2025-02-15 ENCOUNTER — APPOINTMENT (OUTPATIENT)
Dept: RADIOLOGY | Facility: MEDICAL CENTER | Age: 50
End: 2025-02-15
Attending: EMERGENCY MEDICINE
Payer: COMMERCIAL

## 2025-02-15 ENCOUNTER — PHARMACY VISIT (OUTPATIENT)
Dept: PHARMACY | Facility: MEDICAL CENTER | Age: 50
End: 2025-02-15
Payer: COMMERCIAL

## 2025-02-15 VITALS
WEIGHT: 243.17 LBS | BODY MASS INDEX: 43.09 KG/M2 | HEART RATE: 88 BPM | SYSTOLIC BLOOD PRESSURE: 137 MMHG | RESPIRATION RATE: 18 BRPM | DIASTOLIC BLOOD PRESSURE: 88 MMHG | HEIGHT: 63 IN | TEMPERATURE: 97 F | OXYGEN SATURATION: 97 %

## 2025-02-15 DIAGNOSIS — R10.9 FLANK PAIN: ICD-10-CM

## 2025-02-15 LAB
ALBUMIN SERPL BCP-MCNC: 4.1 G/DL (ref 3.2–4.9)
ALBUMIN/GLOB SERPL: 1.3 G/DL
ALP SERPL-CCNC: 68 U/L (ref 30–99)
ALT SERPL-CCNC: 43 U/L (ref 2–50)
ANION GAP SERPL CALC-SCNC: 13 MMOL/L (ref 7–16)
APPEARANCE UR: CLEAR
AST SERPL-CCNC: 35 U/L (ref 12–45)
BILIRUB SERPL-MCNC: 0.2 MG/DL (ref 0.1–1.5)
BILIRUB UR QL STRIP.AUTO: NEGATIVE
BUN SERPL-MCNC: 12 MG/DL (ref 8–22)
CALCIUM ALBUM COR SERPL-MCNC: 9.3 MG/DL (ref 8.5–10.5)
CALCIUM SERPL-MCNC: 9.4 MG/DL (ref 8.5–10.5)
CHLORIDE SERPL-SCNC: 102 MMOL/L (ref 96–112)
CO2 SERPL-SCNC: 21 MMOL/L (ref 20–33)
COLOR UR: YELLOW
CREAT SERPL-MCNC: 0.66 MG/DL (ref 0.5–1.4)
EKG IMPRESSION: NORMAL
GFR SERPLBLD CREATININE-BSD FMLA CKD-EPI: 107 ML/MIN/1.73 M 2
GLOBULIN SER CALC-MCNC: 3.1 G/DL (ref 1.9–3.5)
GLUCOSE SERPL-MCNC: 93 MG/DL (ref 65–99)
GLUCOSE UR STRIP.AUTO-MCNC: NEGATIVE MG/DL
KETONES UR STRIP.AUTO-MCNC: NEGATIVE MG/DL
LEUKOCYTE ESTERASE UR QL STRIP.AUTO: NEGATIVE
MICRO URNS: NORMAL
NITRITE UR QL STRIP.AUTO: NEGATIVE
PH UR STRIP.AUTO: 6 [PH] (ref 5–8)
POTASSIUM SERPL-SCNC: 4.1 MMOL/L (ref 3.6–5.5)
PROT SERPL-MCNC: 7.2 G/DL (ref 6–8.2)
PROT UR QL STRIP: NEGATIVE MG/DL
RBC UR QL AUTO: NEGATIVE
SODIUM SERPL-SCNC: 136 MMOL/L (ref 135–145)
SP GR UR STRIP.AUTO: 1.01
UROBILINOGEN UR STRIP.AUTO-MCNC: 0.2 EU/DL

## 2025-02-15 PROCEDURE — 80053 COMPREHEN METABOLIC PANEL: CPT

## 2025-02-15 PROCEDURE — 93005 ELECTROCARDIOGRAM TRACING: CPT | Mod: TC | Performed by: EMERGENCY MEDICINE

## 2025-02-15 PROCEDURE — 99283 EMERGENCY DEPT VISIT LOW MDM: CPT

## 2025-02-15 PROCEDURE — RXMED WILLOW AMBULATORY MEDICATION CHARGE: Performed by: EMERGENCY MEDICINE

## 2025-02-15 PROCEDURE — 81003 URINALYSIS AUTO W/O SCOPE: CPT

## 2025-02-15 PROCEDURE — 76775 US EXAM ABDO BACK WALL LIM: CPT

## 2025-02-15 PROCEDURE — 36415 COLL VENOUS BLD VENIPUNCTURE: CPT

## 2025-02-15 PROCEDURE — 93005 ELECTROCARDIOGRAM TRACING: CPT | Mod: TC

## 2025-02-15 RX ORDER — IBUPROFEN 600 MG/1
600 TABLET, FILM COATED ORAL EVERY 6 HOURS PRN
Qty: 15 TABLET | Refills: 0 | Status: SHIPPED | OUTPATIENT
Start: 2025-02-15

## 2025-02-15 RX ORDER — CYCLOBENZAPRINE HCL 10 MG
10 TABLET ORAL 3 TIMES DAILY PRN
Qty: 15 TABLET | Refills: 0 | Status: SHIPPED | OUTPATIENT
Start: 2025-02-15

## 2025-02-15 ASSESSMENT — FIBROSIS 4 INDEX: FIB4 SCORE: 0.98

## 2025-02-15 NOTE — ED TRIAGE NOTES
"Chief Complaint   Patient presents with    Flank Pain       Patient to triage for above with a steady gait, AAOx4, Appropriate precautions in place.     2 weeks ago the patient started having pain to her right flank and radiates to right axilla and right breast. Patient says pain feels like \"needles going through the skin\" and \"electricity.\" Denied any rashes. She saw her PCP who was concerned for shingles and ordered an ultrasound. However, patient states pain has been worsening and unable to get ultrasound until end of March.     + nausea x4 days, but no vomiting, diarrhea, constipation, fevers, chills, chest pain, SOB, or problems urinating.     Explained wait time and triage process. Placed to triage room for EKG. Told to notify ED tech or RN of any changes, verbalized understanding.    BP (!) 136/94   Pulse 91   Temp 36.4 °C (97.6 °F) (Temporal)   Resp 16   Ht 1.6 m (5' 3\")   Wt 110 kg (243 lb 2.7 oz)   SpO2 96%   BMI 43.08 kg/m²       "

## 2025-02-15 NOTE — ED PROVIDER NOTES
"ED Provider Note    CHIEF COMPLAINT  Chief Complaint   Patient presents with    Flank Pain       EXTERNAL RECORDS REVIEWED  Outpatient Notes   [place] [facility]     HPI/ROS  LIMITATION TO HISTORY   none  OUTSIDE HISTORIAN(S):  . Pt has pain to the right flank     Bria Martinez is a 49 y.o. female who presents here for right side pain over the last couple months. Pt states 'i have internal shingles.'  The pt has no rash or issues,but is here because her doctor wanted her to have an ultrasound, and get some blood work done.  Patient has no chest pain shortness of breath or vomiting.  She has no fever or chills, she has no other medical concerns at this time.  She did take some medications at home such as Tylenol with little relief.    PAST MEDICAL HISTORY   has a past medical history of Acid reflux disease (08/08/2012), Anesthesia (08/09/2017), Breath shortness (08/09/2017), CHEST PAIN (08/08/2012), Dental disorder (08/09/2017), Gynecological disorder, Heart burn, Hypercholesterolemia (08/08/2012), Migraines, Psychiatric problem (08/09/2017), Stroke (HCC), Tobacco dependence quit 2018 in setting of CVA, and Urinary bladder disorder (08/09/2017).    SURGICAL HISTORY   has a past surgical history that includes tonsillectomy; cholecystectomy (2009); appendectomy; breast biopsy (Left); vaginal hysterectomy scope total (8/23/2017); and salpingectomy (Bilateral, 8/23/2017).    FAMILY HISTORY  No family history on file.    SOCIAL HISTORY  Social History     Tobacco Use    Smoking status: Every Day     Current packs/day: 1.00     Average packs/day: 1 pack/day for 26.7 years (26.7 ttl pk-yrs)     Types: Cigarettes     Start date: 5/28/1998    Smokeless tobacco: Never   Vaping Use    Vaping status: Never Used   Substance and Sexual Activity    Alcohol use: Yes     Comment: \"once in 6 months\"    Drug use: No    Sexual activity: Not on file       CURRENT MEDICATIONS  Home Medications    **Home medications have " "not yet been reviewed for this encounter**         ALLERGIES  Allergies   Allergen Reactions    Bee Anaphylaxis    Bee Venom Anaphylaxis    Augmentin Rash     Pt states \"I get a bad rash\".      Latex Rash and Unspecified     Pt states \"I get a bad rash\".    Neosporin Lip Health Hives     Blisters and swelling at site    Other Misc Rash     Nail acrylic- Pt states \"I get a bad rash\".    Sulfa Drugs Hives, Itching and Swelling     Other Reaction(s): Unknown    Tape Rash     Pt states \"I get a bad rash\".  Paper tape ok    Amoxicillin-Pot Clavulanate Rash and Unspecified     Pt states \"I get a bad rash\".       PHYSICAL EXAM  VITAL SIGNS: BP (!) 136/94   Pulse 91   Temp 36.4 °C (97.6 °F) (Temporal)   Resp 16   Ht 1.6 m (5' 3\")   Wt 110 kg (243 lb 2.7 oz)   SpO2 96%   BMI 43.08 kg/m²    Constitutional: Well developed, well nourished. No acute distress.  HEENT: Normocephalic, atraumatic. Posterior pharynx clear and moist.  Eyes:  EOMI. Normal sclera.  Neck: Supple, Full range of motion, nontender.  Chest/Pulmonary: clear to ausculation. Symmetrical expansion.   Cardio: Regular rate and rhythm with no murmur.   Abdomen: Soft, nontender. No peritoneal signs. No guarding. No palpable masses.  Back: No CVA tenderness, nontender midline, no step offs. No erythema.  No vesicles    Musculoskeletal: No deformity, no edema, neurovascular intact.   Neuro: Clear speech, appropriate, cooperative, cranial nerves II-XII grossly intact.  Psych: Normal mood and affect      EKG;  nsr 73. No st elevation, no st depression, qtc 439. Compared to EKG from 1/23/24.     EKG/LABS  Results for orders placed or performed during the hospital encounter of 02/15/25   EKG    Collection Time: 02/15/25 12:03 PM   Result Value Ref Range    Report       [facility] Emergency Dept.    Test Date:  2025-02-15  Pt Name:    KAMAR BLACKBURN             Department: ER  MRN:        [id number]                      Room:  Gender:     Female   "                     Technician: [id number]  :        1975                   Requested By:ER TRIAGE PROTOCOL  Order #:    [id number]                    Reading MD:    Measurements  Intervals                                Axis  Rate:       73                           P:          39  ID:         [id number]                          QRS:        3  QRSD:       86                           T:          30  QT:         398  QTc:        439    Interpretive Statements  Sinus rhythm  Low voltage, precordial leads  Consider anterior infarct  Compared to ECG 2024 22:08:28  Myocardial infarct finding now present     URINALYSIS,CULTURE IF INDICATED    Collection Time: 02/15/25 12:35 PM    Specimen: Urine, Clean Catch   Result Value Ref Range    Color Yellow     Character Clear     Specific Gravity 1.007 <1.035    Ph 6.0 5.0 - 8.0    Glucose Negative Negative mg/dL    Ketones Negative Negative mg/dL    Protein Negative Negative mg/dL    Bilirubin Negative Negative    Urobilinogen, Urine 0.2 <=1.0 EU/dL    Nitrite Negative Negative    Leukocyte Esterase Negative Negative    Occult Blood Negative Negative    Micro Urine Req see below    Comp Metabolic Panel    Collection Time: 02/15/25 12:46 PM   Result Value Ref Range    Sodium 136 135 - 145 mmol/L    Potassium 4.1 3.6 - 5.5 mmol/L    Chloride 102 96 - 112 mmol/L    Co2 21 20 - 33 mmol/L    Anion Gap 13.0 7.0 - 16.0    Glucose 93 65 - 99 mg/dL    Bun 12 8 - 22 mg/dL    Creatinine 0.66 0.50 - 1.40 mg/dL    Calcium 9.4 8.5 - 10.5 mg/dL    Correct Calcium 9.3 8.5 - 10.5 mg/dL    AST(SGOT) 35 12 - 45 U/L    ALT(SGPT) 43 2 - 50 U/L    Alkaline Phosphatase 68 30 - 99 U/L    Total Bilirubin 0.2 0.1 - 1.5 mg/dL    Albumin 4.1 3.2 - 4.9 g/dL    Total Protein 7.2 6.0 - 8.2 g/dL    Globulin 3.1 1.9 - 3.5 g/dL    A-G Ratio 1.3 g/dL   ESTIMATED GFR    Collection Time: 02/15/25 12:46 PM   Result Value Ref Range    GFR (CKD-EPI) 107 >60 mL/min/1.73 m 2         RADIOLOGY/PROCEDURES   I have  independently interpreted the diagnostic imaging associated with this visit and am waiting the final reading from the radiologist.   My preliminary interpretation is as follows: below     Radiologist interpretation:  US-RENAL   Final Result      Unremarkable kidneys.  No hydronephrosis.          COURSE & MEDICAL DECISION MAKING    ASSESSMENT, COURSE AND PLAN  Care Narrative: This is a 49-year-old female here for evaluation of right flank pain.  Patient had ultrasound that shows no acute findings.   Patient is nontoxic and afebrile comfortable.        DISPOSITION AND DISCUSSIONS  I have discussed management of the patient with the following physicians and TAMIE's: None    Discussion of management with other QHP or appropriate source(s): None    Escalation of care considered, and ultimately not performed: None    Barriers to care at this time, including but not limited to: None.     Decision tools and prescription drugs considered including, but not limited to: None.    FINAL DIAGNOSIS  Flank pain, chronic      Electronically signed by: Maged Luo D.O., 2/15/2025 12:46 PM

## 2025-08-23 ENCOUNTER — HOSPITAL ENCOUNTER (OUTPATIENT)
Dept: LAB | Facility: MEDICAL CENTER | Age: 50
End: 2025-08-23
Attending: INTERNAL MEDICINE
Payer: COMMERCIAL

## 2025-08-23 LAB
ALBUMIN SERPL BCP-MCNC: 4 G/DL (ref 3.2–4.9)
ALBUMIN/GLOB SERPL: 1.5 G/DL
ALP SERPL-CCNC: 73 U/L (ref 30–99)
ALT SERPL-CCNC: 35 U/L (ref 2–50)
ANION GAP SERPL CALC-SCNC: 11 MMOL/L (ref 7–16)
AST SERPL-CCNC: 21 U/L (ref 12–45)
BASOPHILS # BLD AUTO: 0.6 % (ref 0–1.8)
BASOPHILS # BLD: 0.06 K/UL (ref 0–0.12)
BILIRUB SERPL-MCNC: 0.2 MG/DL (ref 0.1–1.5)
BUN SERPL-MCNC: 16 MG/DL (ref 8–22)
CALCIUM ALBUM COR SERPL-MCNC: 8.9 MG/DL (ref 8.5–10.5)
CALCIUM SERPL-MCNC: 8.9 MG/DL (ref 8.5–10.5)
CHLORIDE SERPL-SCNC: 106 MMOL/L (ref 96–112)
CO2 SERPL-SCNC: 20 MMOL/L (ref 20–33)
CREAT SERPL-MCNC: 0.7 MG/DL (ref 0.5–1.4)
CRP SERPL HS-MCNC: <0.3 MG/DL (ref 0–0.75)
EOSINOPHIL # BLD AUTO: 0.24 K/UL (ref 0–0.51)
EOSINOPHIL NFR BLD: 2.2 % (ref 0–6.9)
ERYTHROCYTE [DISTWIDTH] IN BLOOD BY AUTOMATED COUNT: 40.2 FL (ref 35.9–50)
ERYTHROCYTE [SEDIMENTATION RATE] IN BLOOD BY WESTERGREN METHOD: 11 MM/HOUR (ref 0–25)
GFR SERPLBLD CREATININE-BSD FMLA CKD-EPI: 105 ML/MIN/1.73 M 2
GLOBULIN SER CALC-MCNC: 2.7 G/DL (ref 1.9–3.5)
GLUCOSE SERPL-MCNC: 109 MG/DL (ref 65–99)
HCT VFR BLD AUTO: 44.2 % (ref 37–47)
HGB BLD-MCNC: 15.3 G/DL (ref 12–16)
IMM GRANULOCYTES # BLD AUTO: 0.04 K/UL (ref 0–0.11)
IMM GRANULOCYTES NFR BLD AUTO: 0.4 % (ref 0–0.9)
LYMPHOCYTES # BLD AUTO: 3.17 K/UL (ref 1–4.8)
LYMPHOCYTES NFR BLD: 29.3 % (ref 22–41)
MCH RBC QN AUTO: 30.8 PG (ref 27–33)
MCHC RBC AUTO-ENTMCNC: 34.6 G/DL (ref 32.2–35.5)
MCV RBC AUTO: 89.1 FL (ref 81.4–97.8)
MONOCYTES # BLD AUTO: 0.93 K/UL (ref 0–0.85)
MONOCYTES NFR BLD AUTO: 8.6 % (ref 0–13.4)
NEUTROPHILS # BLD AUTO: 6.37 K/UL (ref 1.82–7.42)
NEUTROPHILS NFR BLD: 58.9 % (ref 44–72)
NRBC # BLD AUTO: 0 K/UL
NRBC BLD-RTO: 0 /100 WBC (ref 0–0.2)
PLATELET # BLD AUTO: 210 K/UL (ref 164–446)
PMV BLD AUTO: 10.9 FL (ref 9–12.9)
POTASSIUM SERPL-SCNC: 4.1 MMOL/L (ref 3.6–5.5)
PROT SERPL-MCNC: 6.7 G/DL (ref 6–8.2)
RBC # BLD AUTO: 4.96 M/UL (ref 4.2–5.4)
SODIUM SERPL-SCNC: 137 MMOL/L (ref 135–145)
T4 FREE SERPL-MCNC: 1.05 NG/DL (ref 0.93–1.7)
THYROPEROXIDASE AB SERPL-ACNC: 14.3 IU/ML (ref 0–9)
TSH SERPL-ACNC: 1.43 UIU/ML (ref 0.38–5.33)
WBC # BLD AUTO: 10.8 K/UL (ref 4.8–10.8)

## 2025-08-23 PROCEDURE — 86800 THYROGLOBULIN ANTIBODY: CPT

## 2025-08-23 PROCEDURE — 85652 RBC SED RATE AUTOMATED: CPT

## 2025-08-23 PROCEDURE — 84443 ASSAY THYROID STIM HORMONE: CPT

## 2025-08-23 PROCEDURE — 82785 ASSAY OF IGE: CPT

## 2025-08-23 PROCEDURE — 83516 IMMUNOASSAY NONANTIBODY: CPT

## 2025-08-23 PROCEDURE — 85025 COMPLETE CBC W/AUTO DIFF WBC: CPT

## 2025-08-23 PROCEDURE — 88184 FLOWCYTOMETRY/ TC 1 MARKER: CPT

## 2025-08-23 PROCEDURE — 86140 C-REACTIVE PROTEIN: CPT

## 2025-08-23 PROCEDURE — 36415 COLL VENOUS BLD VENIPUNCTURE: CPT

## 2025-08-23 PROCEDURE — 83520 IMMUNOASSAY QUANT NOS NONAB: CPT

## 2025-08-23 PROCEDURE — 86003 ALLG SPEC IGE CRUDE XTRC EA: CPT

## 2025-08-23 PROCEDURE — 84439 ASSAY OF FREE THYROXINE: CPT

## 2025-08-23 PROCEDURE — 80053 COMPREHEN METABOLIC PANEL: CPT

## 2025-08-23 PROCEDURE — 88185 FLOWCYTOMETRY/TC ADD-ON: CPT | Mod: 91

## 2025-08-23 PROCEDURE — 86038 ANTINUCLEAR ANTIBODIES: CPT

## 2025-08-23 PROCEDURE — 86376 MICROSOMAL ANTIBODY EACH: CPT

## 2025-08-25 LAB
NUCLEAR IGG SER QL IA: NORMAL
THYROGLOB AB SERPL-ACNC: <1.5 IU/ML (ref 0–4)

## 2025-08-26 LAB
DEPRECATED MISC ALLERGEN IGE RAST QL: ABNORMAL
HONEY BEE IGE QN: <0.1 KU/L
IGE SERPL-ACNC: 33 KU/L
MYELOPEROXIDASE AB SER-ACNC: 0 AU/ML (ref 0–19)
PAPER WASP IGE QN: <0.1 KU/L
PROTEINASE3 AB SER-ACNC: 0 AU/ML (ref 0–19)
TRYPTASE SERPL-MCNC: 5.4 UG/L
WHITEFACED HORNET IGE QN: <0.1 KU/L
YELLOW HORNET IGE QN: <0.1 KU/L
YELLOW JACKET IGE QN: 0.95 KU/L

## 2025-08-30 LAB — URTIIND BASO ACT Q4770: 33 %

## (undated) DEVICE — SODIUM CHL IRRIGATION 0.9% 1000ML (12EA/CA)

## (undated) DEVICE — GLOVE BIOGEL PI INDICATOR SZ 6.5 SURGICAL PF LF - (50/BX 4BX/CA)

## (undated) DEVICE — SET EXTENSION WITH 2 PORTS (48EA/CA) ***PART #2C8610 IS A SUBSTITUTE*****

## (undated) DEVICE — WATER IRRIGATION STERILE 1000ML (12EA/CA)

## (undated) DEVICE — TROCAR5X55 KII SHIELDED SYS - (6/BX)

## (undated) DEVICE — TUBING CLEARLINK DUO-VENT - C-FLO (48EA/CA)

## (undated) DEVICE — CANISTER SUCTION 3000ML MECHANICAL FILTER AUTO SHUTOFF MEDI-VAC NONSTERILE LF DISP  (40EA/CA)

## (undated) DEVICE — DRAPE MAYO STAND - (30/CA)

## (undated) DEVICE — MANIFOLD NEPTUNE 1 PORT (20/PK)

## (undated) DEVICE — TROCAR Z THREAD 11 X 100 - BLADED (6/BX)

## (undated) DEVICE — SET LEADWIRE 5 LEAD BEDSIDE DISPOSABLE ECG (1SET OF 5/EA)

## (undated) DEVICE — TROCAR 5X100 BLADED Z-THREAD - KII (6/BX)

## (undated) DEVICE — PAD BABY LAP 4X18 W/O - RINGS PREWASHED 5/PK 40PK/CS

## (undated) DEVICE — NEPTUNE 4 PORT MANIFOLD - (20/PK)

## (undated) DEVICE — SET SUCTION/IRRIGATION WITH DISPOSABLE TIP (6/CA )PART #0250-070-520 IS A SUB

## (undated) DEVICE — WATER IRRIG. STER 3000 ML - (4/CA)

## (undated) DEVICE — MASK ANESTHESIA ADULT  - (100/CA)

## (undated) DEVICE — ARMREST CRADLE FOAM - (2PR/PK 12PR/CA)

## (undated) DEVICE — SUCTION INSTRUMENT YANKAUER BULBOUS TIP W/O VENT (50EA/CA)

## (undated) DEVICE — TUBING INSUFFLATION - (10/BX)

## (undated) DEVICE — SENSOR SPO2 NEO LNCS ADHESIVE (20/BX) SEE USER NOTES

## (undated) DEVICE — SPONGE GAUZESTER. 2X2 4-PL - (2/PK 50PK/BX 30BX/CS)

## (undated) DEVICE — SUTURE 4-0 VICRYL PLUSFS-1 - 27 INCH (36/BX)

## (undated) DEVICE — NEEDLE INSFL 120MM 14GA VRRS - (20/BX)

## (undated) DEVICE — ELECTRODE 850 FOAM ADHESIVE - HYDROGEL RADIOTRNSPRNT (50/PK)

## (undated) DEVICE — KIT ANESTHESIA W/CIRCUIT & 3/LT BAG W/FILTER (20EA/CA)

## (undated) DEVICE — DRAPE VAGINAL BIB W/ POUCH (10EA/CA)

## (undated) DEVICE — SET IRRIGATION CYSTOSCOPY TUBE L80 IN (20EA/CA)

## (undated) DEVICE — PACK LAPAROSCOPY - (1/CA)

## (undated) DEVICE — GLOVE BIOGEL PI INDICATOR SZ 7.0 SURGICAL PF LF - (50/BX 4BX/CA)

## (undated) DEVICE — HEAD HOLDER JUNIOR/ADULT

## (undated) DEVICE — SUTURE 0 VICRYL PLUS CT-1 - 36 INCH (36/BX)

## (undated) DEVICE — SUTURE 0 VICRYL PLUS CT-2 - 27 INCH (36/BX)

## (undated) DEVICE — ELECTRODE DUAL RETURN W/ CORD - (50/PK)

## (undated) DEVICE — SUTURE 2-0 VICRYL PLUS CT-2 - 27 INCH (36/BX)

## (undated) DEVICE — TUBE CONNECT SUCTION CLEAR 120 X 1/4" (50EA/CA)"

## (undated) DEVICE — PAD OR TABLE DA VINCI 2IN X 20IN X 72IN - (12EA/CA)

## (undated) DEVICE — LACTATED RINGERS INJ 1000 ML - (14EA/CA 60CA/PF)

## (undated) DEVICE — SPONGE PREP 10/PK (CENTRAL)

## (undated) DEVICE — SLEEVE, VASO, THIGH, MED

## (undated) DEVICE — DRAPE LARGE 3 QUARTER - (20/CA)

## (undated) DEVICE — PACK MINOR BASIN - (2EA/CA)

## (undated) DEVICE — SYRINGE SAFETY TB 1 ML 27 GA X 1/2 IN (100/BX 4BX/CA)

## (undated) DEVICE — GUIDE TRACHE TUBE INTUBATING STYLET 5.0-10.0MM 14FR (20EA/PK)

## (undated) DEVICE — TUBE E-T HI-LO CUFF 7.0MM (10EA/PK)

## (undated) DEVICE — FOLEY SLIPPERY 5CC 16 FR LF ALL SILICONE (12EA/CA)

## (undated) DEVICE — BLADE SURGICAL #10 - (50/BX)

## (undated) DEVICE — GOWN WARMING STANDARD FLEX - (30/CA)

## (undated) DEVICE — TRAY CATHETER FOLEY URINE METER W/STATLOCK 350ML (10EA/CA)

## (undated) DEVICE — GLOVE BIOGEL PI INDICATOR SZ 7.5 SURGICAL PF LF -(50/BX 4BX/CA)

## (undated) DEVICE — LIGASURE 5MM BLUNT TIP LONG - 44CM (6EA/PK)

## (undated) DEVICE — SUTURE GENERAL

## (undated) DEVICE — KIT ROOM DECONTAMINATION